# Patient Record
Sex: FEMALE | Race: WHITE | NOT HISPANIC OR LATINO | Employment: FULL TIME | URBAN - METROPOLITAN AREA
[De-identification: names, ages, dates, MRNs, and addresses within clinical notes are randomized per-mention and may not be internally consistent; named-entity substitution may affect disease eponyms.]

---

## 2017-01-23 ENCOUNTER — PATIENT MESSAGE (OUTPATIENT)
Dept: RHEUMATOLOGY | Facility: CLINIC | Age: 47
End: 2017-01-23

## 2017-04-04 ENCOUNTER — LAB VISIT (OUTPATIENT)
Dept: LAB | Facility: HOSPITAL | Age: 47
End: 2017-04-04
Attending: INTERNAL MEDICINE
Payer: COMMERCIAL

## 2017-04-04 DIAGNOSIS — M05.79 RHEUMATOID ARTHRITIS INVOLVING MULTIPLE SITES WITH POSITIVE RHEUMATOID FACTOR: Chronic | ICD-10-CM

## 2017-04-04 LAB
ALBUMIN SERPL BCP-MCNC: 4.1 G/DL
ALP SERPL-CCNC: 93 U/L
ALT SERPL W/O P-5'-P-CCNC: 26 U/L
ANION GAP SERPL CALC-SCNC: 8 MMOL/L
AST SERPL-CCNC: 26 U/L
BASOPHILS # BLD AUTO: 0.07 K/UL
BASOPHILS NFR BLD: 1.2 %
BILIRUB SERPL-MCNC: 0.4 MG/DL
BUN SERPL-MCNC: 26 MG/DL
CALCIUM SERPL-MCNC: 9.9 MG/DL
CHLORIDE SERPL-SCNC: 109 MMOL/L
CO2 SERPL-SCNC: 27 MMOL/L
CREAT SERPL-MCNC: 1.3 MG/DL
DIFFERENTIAL METHOD: ABNORMAL
EOSINOPHIL # BLD AUTO: 0.3 K/UL
EOSINOPHIL NFR BLD: 4.7 %
ERYTHROCYTE [DISTWIDTH] IN BLOOD BY AUTOMATED COUNT: 12.4 %
ERYTHROCYTE [SEDIMENTATION RATE] IN BLOOD BY WESTERGREN METHOD: 4 MM/HR
EST. GFR  (AFRICAN AMERICAN): 56 ML/MIN/1.73 M^2
EST. GFR  (NON AFRICAN AMERICAN): 48 ML/MIN/1.73 M^2
GLUCOSE SERPL-MCNC: 86 MG/DL
HCT VFR BLD AUTO: 40.2 %
HGB BLD-MCNC: 13.3 G/DL
LYMPHOCYTES # BLD AUTO: 2 K/UL
LYMPHOCYTES NFR BLD: 33.6 %
MCH RBC QN AUTO: 31.4 PG
MCHC RBC AUTO-ENTMCNC: 33.1 %
MCV RBC AUTO: 95 FL
MONOCYTES # BLD AUTO: 0.5 K/UL
MONOCYTES NFR BLD: 8.9 %
NEUTROPHILS # BLD AUTO: 3.1 K/UL
NEUTROPHILS NFR BLD: 51.6 %
PLATELET # BLD AUTO: 284 K/UL
PMV BLD AUTO: 10.9 FL
POTASSIUM SERPL-SCNC: 4.1 MMOL/L
PROT SERPL-MCNC: 6.9 G/DL
RBC # BLD AUTO: 4.24 M/UL
SODIUM SERPL-SCNC: 144 MMOL/L
WBC # BLD AUTO: 5.93 K/UL

## 2017-04-04 PROCEDURE — 85651 RBC SED RATE NONAUTOMATED: CPT | Mod: PO

## 2017-04-04 PROCEDURE — 85025 COMPLETE CBC W/AUTO DIFF WBC: CPT | Mod: PO

## 2017-04-04 PROCEDURE — 80053 COMPREHEN METABOLIC PANEL: CPT | Mod: PO

## 2017-04-04 PROCEDURE — 86140 C-REACTIVE PROTEIN: CPT

## 2017-04-04 PROCEDURE — 36415 COLL VENOUS BLD VENIPUNCTURE: CPT | Mod: PO

## 2017-04-05 ENCOUNTER — OFFICE VISIT (OUTPATIENT)
Dept: RHEUMATOLOGY | Facility: CLINIC | Age: 47
End: 2017-04-05
Payer: COMMERCIAL

## 2017-04-05 VITALS
DIASTOLIC BLOOD PRESSURE: 60 MMHG | SYSTOLIC BLOOD PRESSURE: 107 MMHG | WEIGHT: 144.38 LBS | BODY MASS INDEX: 22.28 KG/M2 | HEART RATE: 64 BPM

## 2017-04-05 DIAGNOSIS — D84.9 IMMUNOCOMPROMISED: Primary | ICD-10-CM

## 2017-04-05 DIAGNOSIS — Z51.81 MEDICATION MONITORING ENCOUNTER: Chronic | ICD-10-CM

## 2017-04-05 DIAGNOSIS — M05.79 RHEUMATOID ARTHRITIS INVOLVING MULTIPLE SITES WITH POSITIVE RHEUMATOID FACTOR: Chronic | ICD-10-CM

## 2017-04-05 LAB
CCP AB SER IA-ACNC: 163.6 U/ML
CRP SERPL-MCNC: 0.4 MG/L

## 2017-04-05 PROCEDURE — 99999 PR PBB SHADOW E&M-EST. PATIENT-LVL III: CPT | Mod: PBBFAC,,, | Performed by: PHYSICIAN ASSISTANT

## 2017-04-05 PROCEDURE — 86200 CCP ANTIBODY: CPT

## 2017-04-05 PROCEDURE — 99214 OFFICE O/P EST MOD 30 MIN: CPT | Mod: S$GLB,,, | Performed by: PHYSICIAN ASSISTANT

## 2017-04-05 PROCEDURE — 1160F RVW MEDS BY RX/DR IN RCRD: CPT | Mod: S$GLB,,, | Performed by: PHYSICIAN ASSISTANT

## 2017-04-05 RX ORDER — AMOXICILLIN 500 MG
2 CAPSULE ORAL
COMMUNITY

## 2017-04-05 RX ORDER — IBUPROFEN 100 MG/5ML
1000 SUSPENSION, ORAL (FINAL DOSE FORM) ORAL
COMMUNITY

## 2017-04-05 RX ORDER — MONTELUKAST SODIUM 10 MG/1
10 TABLET ORAL DAILY PRN
COMMUNITY
Start: 2017-04-04 | End: 2017-12-11

## 2017-04-05 RX ORDER — MULTIVITAMIN
1 TABLET ORAL
COMMUNITY

## 2017-04-05 ASSESSMENT — CLINICAL DISEASE ACTIVITY INDEX (CDAI)
TOTAL_SCORE: 3
PHYSICIAN_ASSESSMENT: 1
TENDER_JOINTS_COUNT: 1
SWOLLEN_JOINTS_COUNT: 0
PATIENT_ASSESSMENT: 1

## 2017-04-05 ASSESSMENT — ROUTINE ASSESSMENT OF PATIENT INDEX DATA (RAPID3): MDHAQ FUNCTION SCORE: 0

## 2017-04-05 NOTE — PROGRESS NOTES
Subjective:       Patient ID: Elizabeth Johns is a 47 y.o. female.    Chief Complaint: Rheumatoid Arthritis      HPI Comments: Elizabeth is here today  For rheum follow up. seropositive rheumatoid arthritis. She has been on orencia for several year. Doing excellent. She has been able to de-escalate down to 1 injection every 4 weeks. Failed mtx monotherapy in the past. Off mtx due to side effects (fatigue and nausea). She is not off prednisone. In the past failed humira, enbrel and xeljanz these just did not help. Has been pain free for quite some time. The past 2 days some mild increased right shoulder pain. Feeling better today. Pain 5/10 isolated to right shoulder only. At times some aching at night. She denies morning stiffness.  No issues with fevers or infections.  No weight loss.      High titers CCP positive but sedimentation rate and CRP have been normal.  Rheumatoid factor negative.           Shoulder Pain   Associated symptoms include arthralgias. Pertinent negatives include no abdominal pain, chest pain, chills, fatigue, fever, joint swelling, myalgias, nausea, neck pain, rash, vomiting or weakness.       Review of Systems   Constitutional: Negative.  Negative for activity change, appetite change, chills, fatigue and fever.   HENT: Negative.  Negative for mouth sores and trouble swallowing.         No dry mouth   Eyes: Negative.  Negative for photophobia, pain and redness.        No swollen or red eyes, no dry eye     Respiratory: Negative.  Negative for chest tightness, shortness of breath, wheezing and stridor.    Cardiovascular: Negative.  Negative for chest pain.   Gastrointestinal: Negative.  Negative for abdominal pain, blood in stool, diarrhea, nausea and vomiting.   Genitourinary: Negative.  Negative for dysuria, frequency, hematuria and urgency.   Musculoskeletal: Positive for arthralgias. Negative for back pain, gait problem, joint swelling, myalgias, neck pain and neck stiffness.              Skin: Negative.  Negative for color change, pallor and rash.   Neurological: Negative.  Negative for weakness.   Hematological: Negative for adenopathy.   Psychiatric/Behavioral: Negative for suicidal ideas.   All other systems reviewed and are negative.        Objective:   /60  Pulse 64  Wt 65.5 kg (144 lb 6.4 oz)  BMI 22.28 kg/m2     Physical Exam   Constitutional: She is oriented to person, place, and time and well-developed, well-nourished, and in no distress. No distress.   HENT:   Head: Normocephalic and atraumatic.   Right Ear: External ear normal.   Left Ear: External ear normal.   Mouth/Throat: No oropharyngeal exudate.   Eyes: Conjunctivae and EOM are normal. Pupils are equal, round, and reactive to light. No scleral icterus.   Neck: Normal range of motion. Neck supple. No thyromegaly present.   Cardiovascular: Normal rate, regular rhythm and normal heart sounds.    No murmur heard.  Pulmonary/Chest: Effort normal and breath sounds normal. She exhibits no tenderness.   Abdominal: Soft. Bowel sounds are normal.       Right Side Rheumatological Exam     Examination finds the elbow, wrist, knee, 1st PIP, 1st MCP, 2nd PIP, 2nd MCP, 3rd PIP, 3rd MCP, 4th PIP, 4th MCP, 5th PIP and 5th MCP normal.    The patient is tender to palpation of the shoulder    Joint Exam Comments   Shoulder: Full range of motion of the right shoulder without any significant signs of impingement, no evidence of rotator cuff tear strength is normal  Some tenderness to palpation of the anterior joint more consistent with a tendinitis  No erythema, warmth or swelling noted    Left Side Rheumatological Exam     Examination finds the elbow, wrist, 1st PIP, 1st MCP, 2nd PIP, 2nd MCP, 3rd PIP, 3rd MCP, 4th PIP, 4th MCP, 5th PIP and 5th MCP normal.    The patient is tender to palpation of the shoulder.      Lymphadenopathy:     She has no cervical adenopathy.   Neurological: She is alert and oriented to person, place, and time. She  displays normal reflexes. No cranial nerve deficit. She exhibits normal muscle tone. Gait normal.   Skin: Skin is warm and dry. No rash noted.     Musculoskeletal: Normal range of motion. She exhibits tenderness. She exhibits no edema.                  Results for orders placed or performed in visit on 04/04/17   CBC auto differential   Result Value Ref Range    WBC 5.93 3.90 - 12.70 K/uL    RBC 4.24 4.00 - 5.40 M/uL    Hemoglobin 13.3 12.0 - 16.0 g/dL    Hematocrit 40.2 37.0 - 48.5 %    MCV 95 82 - 98 fL    MCH 31.4 (H) 27.0 - 31.0 pg    MCHC 33.1 32.0 - 36.0 %    RDW 12.4 11.5 - 14.5 %    Platelets 284 150 - 350 K/uL    MPV 10.9 9.2 - 12.9 fL    Gran # 3.1 1.8 - 7.7 K/uL    Lymph # 2.0 1.0 - 4.8 K/uL    Mono # 0.5 0.3 - 1.0 K/uL    Eos # 0.3 0.0 - 0.5 K/uL    Baso # 0.07 0.00 - 0.20 K/uL    Gran% 51.6 38.0 - 73.0 %    Lymph% 33.6 18.0 - 48.0 %    Mono% 8.9 4.0 - 15.0 %    Eosinophil% 4.7 0.0 - 8.0 %    Basophil% 1.2 0.0 - 1.9 %    Differential Method Automated    Comprehensive metabolic panel   Result Value Ref Range    Sodium 144 136 - 145 mmol/L    Potassium 4.1 3.5 - 5.1 mmol/L    Chloride 109 95 - 110 mmol/L    CO2 27 23 - 29 mmol/L    Glucose 86 70 - 110 mg/dL    BUN, Bld 26 (H) 6 - 20 mg/dL    Creatinine 1.3 0.5 - 1.4 mg/dL    Calcium 9.9 8.7 - 10.5 mg/dL    Total Protein 6.9 6.0 - 8.4 g/dL    Albumin 4.1 3.5 - 5.2 g/dL    Total Bilirubin 0.4 0.1 - 1.0 mg/dL    Alkaline Phosphatase 93 55 - 135 U/L    AST 26 10 - 40 U/L    ALT 26 10 - 44 U/L    Anion Gap 8 8 - 16 mmol/L    eGFR if African American 56 (A) >60 mL/min/1.73 m^2    eGFR if non African American 48 (A) >60 mL/min/1.73 m^2   Sedimentation rate, manual   Result Value Ref Range    Sed Rate 4 0 - 20 mm/Hr          Assessment:       1. Immunocompromised    2. Rheumatoid arthritis involving multiple sites with positive rheumatoid factor    3. Medication monitoring encounter        1.  Seropositive rheumatoid arthritis doing well on orencia 125 mg every 4  week injection today 1 tender/ 0 swollen joints - minimal activity noted by CDAI 3, BHASKAR 0  Off oral methotrexate because of side effects  In the past failed methotrexate monotherapy, failed Humira, Enbrel and Xeljanz    Excellent response to Orencia    2.  Mild right shoulder pain most likely very mild tendinitis no signs of impingement or rotator cuff tear     3.   Vaccines up-to-date     4.  Medication monitoring with no toxicity side effects, chronic immunosuppression with no recurrent infections     Plan:         continue her orencia 125 mg sub Q 4 weeks, if she develops any increasing joint symptoms and we can increase her frequency to about every 3 weeks and see if that helps     For right shoulder tendinitis trial of Pennsaid topical bid to right shoulder  Can try over-the-counter nonsteroidals  Let me know if symptoms worsen     See her back in 5 months with labs,     call with any questions, changes, or concerns.

## 2017-04-05 NOTE — MR AVS SNAPSHOT
[facility] Rheumatology  [address] [place] [address]  Phone: [phone]  Fax: [phone]                  Elizabeth Johns   2017 11:30 AM   Office Visit    Description:  Female : 1970   Provider:  Niurka Rubio PA-C   Department:  [facility] - Rheumatology           Reason for Visit     Rheumatoid Arthritis           Diagnoses this Visit        Comments    Immunocompromised    -  Primary     Rheumatoid arthritis involving multiple sites with positive rheumatoid factor         Medication monitoring encounter                To Do List           Future Appointments        Provider Department Dept Phone    2017 11:05 AM LABORATORY, [facility] [phone]    2017 3:30 PM Osmar Childers MD [facility] [phone]      Goals (5 Years of Data)     None      [facility]     [facility] Care [facility] -  Assistance  Unless otherwise directed by your provider, please contact [facility], our nurse care line that is available for  assistance.     Registered nurses in the [facility] provide: appointment scheduling, clinical advisement, health education, and other advisory services.  Call: [phone] (toll free)               Medications           Message regarding Medications     Verify the changes and/or additions to your medication regime listed below are the same as discussed with your clinician today.  If any of these changes or additions are incorrect, please notify your healthcare provider.        STOP taking these medications     sodium chloride 0.9% flush 10 mL     sodium chloride 0.9% flush 10 mL     sodium chloride 0.9% flush 10 mL     sodium chloride 0.9% flush 10 mL     sodium chloride 0.9% flush 10 mL     sodium chloride 0.9% flush 10 mL            Verify that the below list of medications is an accurate representation of the medications you are currently taking.  If none reported, the list may be  blank. If incorrect, please contact your healthcare provider. Carry this list with you in case of emergency.           Current Medications     abatacept (ORENCIA) 125 mg/mL Syrg Inject 125 mg into the skin every 7 days.    ascorbic acid, vitamin C, (VITAMIN C) 1000 MG tablet Take 1,000 mg by mouth.    fish oil-omega-3 fatty acids 300-1,000 mg capsule Take 2 g by mouth.    montelukast (SINGULAIR) 10 mg tablet 10 mg daily as needed.    multivitamin (THERAGRAN) per tablet Take 1 tablet by mouth.           Clinical Reference Information           Your Vitals Were     BP Pulse Weight BMI       107/60 64 65.5 kg (144 lb 6.4 oz) 22.28 kg/m2       Blood Pressure          Most Recent Value    BP  107/60      Allergies as of 4/5/2017     No Known Allergies      Immunizations Administered on Date of Encounter - 4/5/2017     None      Orders Placed During Today's Visit      Normal Orders This Visit    Cyclic citrul peptide antibody, IgG       Language Assistance Services     ATTENTION: Language assistance services are available, free of charge. Please call [phone].      ATENCIÓN: Si habla ottoniel, tiene a marie disposición servicios gratuitos de asistencia lingüística. Llame al [phone].     SOFI Ý: N?u b?n nói Ti?ng Vi?t, có các d?ch v? h? tr? ngôn ng? mi?n phí dành cho b?n. G?i s? [phone].         [facility] - Rheumatology complies with applicable Federal civil rights laws and does not discriminate on the basis of race, color, national origin, age, disability, or sex.

## 2017-04-20 ENCOUNTER — TELEPHONE (OUTPATIENT)
Dept: RHEUMATOLOGY | Facility: CLINIC | Age: 47
End: 2017-04-20

## 2017-04-20 DIAGNOSIS — G89.29 CHRONIC PAIN OF BOTH SHOULDERS: Primary | ICD-10-CM

## 2017-04-20 DIAGNOSIS — M05.79 RHEUMATOID ARTHRITIS INVOLVING MULTIPLE SITES WITH POSITIVE RHEUMATOID FACTOR: Chronic | ICD-10-CM

## 2017-04-20 DIAGNOSIS — M25.511 CHRONIC PAIN OF BOTH SHOULDERS: Primary | ICD-10-CM

## 2017-04-20 DIAGNOSIS — M25.512 CHRONIC PAIN OF BOTH SHOULDERS: Primary | ICD-10-CM

## 2017-04-20 RX ORDER — CELECOXIB 200 MG/1
200 CAPSULE ORAL DAILY
Qty: 30 CAPSULE | Refills: 6 | Status: SHIPPED | OUTPATIENT
Start: 2017-04-20 | End: 2017-09-11

## 2017-04-20 NOTE — TELEPHONE ENCOUNTER
Patient is requesting Celebrex due to bilat shoulder pain and other joints. She states she is currently in a flare after being off of her meds for 3 weeks post surgery. Please advise.

## 2017-04-25 ENCOUNTER — OFFICE VISIT (OUTPATIENT)
Dept: RHEUMATOLOGY | Facility: CLINIC | Age: 47
End: 2017-04-25
Payer: COMMERCIAL

## 2017-04-25 VITALS
DIASTOLIC BLOOD PRESSURE: 45 MMHG | WEIGHT: 148.38 LBS | HEART RATE: 60 BPM | HEIGHT: 68 IN | BODY MASS INDEX: 22.49 KG/M2 | SYSTOLIC BLOOD PRESSURE: 106 MMHG

## 2017-04-25 DIAGNOSIS — M05.712 RHEUMATOID ARTHRITIS INVOLVING LEFT SHOULDER WITH POSITIVE RHEUMATOID FACTOR: ICD-10-CM

## 2017-04-25 DIAGNOSIS — M05.79 RHEUMATOID ARTHRITIS INVOLVING MULTIPLE SITES WITH POSITIVE RHEUMATOID FACTOR: Primary | Chronic | ICD-10-CM

## 2017-04-25 DIAGNOSIS — Z51.81 MEDICATION MONITORING ENCOUNTER: Chronic | ICD-10-CM

## 2017-04-25 DIAGNOSIS — D84.9 IMMUNOCOMPROMISED: ICD-10-CM

## 2017-04-25 PROCEDURE — 20610 DRAIN/INJ JOINT/BURSA W/O US: CPT | Mod: S$GLB,,, | Performed by: PHYSICIAN ASSISTANT

## 2017-04-25 PROCEDURE — 99214 OFFICE O/P EST MOD 30 MIN: CPT | Mod: 25,S$GLB,, | Performed by: PHYSICIAN ASSISTANT

## 2017-04-25 PROCEDURE — 99999 PR PBB SHADOW E&M-EST. PATIENT-LVL III: CPT | Mod: PBBFAC,,, | Performed by: PHYSICIAN ASSISTANT

## 2017-04-25 PROCEDURE — 1160F RVW MEDS BY RX/DR IN RCRD: CPT | Mod: S$GLB,,, | Performed by: PHYSICIAN ASSISTANT

## 2017-04-25 RX ORDER — METHYLPREDNISOLONE ACETATE 80 MG/ML
80 INJECTION, SUSPENSION INTRA-ARTICULAR; INTRALESIONAL; INTRAMUSCULAR; SOFT TISSUE
Status: COMPLETED | OUTPATIENT
Start: 2017-04-25 | End: 2017-04-25

## 2017-04-25 RX ORDER — DICLOFENAC SODIUM 20 MG/G
40 SOLUTION TOPICAL 2 TIMES DAILY
Qty: 1 BOTTLE | Refills: 6 | Status: SHIPPED | OUTPATIENT
Start: 2017-04-25 | End: 2017-09-11 | Stop reason: SDUPTHER

## 2017-04-25 RX ORDER — BETAMETHASONE SODIUM PHOSPHATE AND BETAMETHASONE ACETATE 3; 3 MG/ML; MG/ML
3 INJECTION, SUSPENSION INTRA-ARTICULAR; INTRALESIONAL; INTRAMUSCULAR; SOFT TISSUE
Status: COMPLETED | OUTPATIENT
Start: 2017-04-25 | End: 2017-04-25

## 2017-04-25 RX ADMIN — BETAMETHASONE SODIUM PHOSPHATE AND BETAMETHASONE ACETATE 3 MG: 3; 3 INJECTION, SUSPENSION INTRA-ARTICULAR; INTRALESIONAL; INTRAMUSCULAR; SOFT TISSUE at 08:04

## 2017-04-25 RX ADMIN — METHYLPREDNISOLONE ACETATE 80 MG: 80 INJECTION, SUSPENSION INTRA-ARTICULAR; INTRALESIONAL; INTRAMUSCULAR; SOFT TISSUE at 08:04

## 2017-04-25 ASSESSMENT — CLINICAL DISEASE ACTIVITY INDEX (CDAI)
TOTAL_SCORE: 6
SWOLLEN_JOINTS_COUNT: 1
TENDER_JOINTS_COUNT: 2
PHYSICIAN_ASSESSMENT: 1
PATIENT_ASSESSMENT: 2

## 2017-04-25 ASSESSMENT — ROUTINE ASSESSMENT OF PATIENT INDEX DATA (RAPID3): MDHAQ FUNCTION SCORE: .3

## 2017-04-25 NOTE — PROGRESS NOTES
Subjective:       Patient ID: Elizabeth Johns is a 47 y.o. female.    Chief Complaint: Rheumatoid Arthritis      HPI Comments: Elizabeth is here today  For rheum follow up. seropositive rheumatoid arthritis. She has been on orencia for several year. Doing excellent. She has been able to de-escalate down to 1 injection every 3-4 weeks. Failed mtx monotherapy in the past. Off mtx due to side effects (fatigue and nausea). She is not off prednisone. In the past failed humira, enbrel and xeljanz these just did not help.     Had surgery in feb. Off orencia X 4 weeks. Initially did well but last week started having more hand and left shoulder pain. Significant stiffness in her hands, left gabino. Orencia was still at every 3 weeks.  Pain 5/10   No issues with fevers or infections.  No weight loss.      High titers CCP positive but sedimentation rate and CRP have been normal.  Rheumatoid factor negative.           Shoulder Pain   Associated symptoms include arthralgias. Pertinent negatives include no abdominal pain, chest pain, chills, fatigue, fever, joint swelling, myalgias, nausea, neck pain, rash, vomiting or weakness.       Review of Systems   Constitutional: Negative.  Negative for activity change, appetite change, chills, fatigue and fever.   HENT: Negative.  Negative for mouth sores and trouble swallowing.         No dry mouth   Eyes: Negative.  Negative for photophobia, pain and redness.        No swollen or red eyes, no dry eye     Respiratory: Negative.  Negative for chest tightness, shortness of breath, wheezing and stridor.    Cardiovascular: Negative.  Negative for chest pain.   Gastrointestinal: Negative.  Negative for abdominal pain, blood in stool, diarrhea, nausea and vomiting.   Genitourinary: Negative.  Negative for dysuria, frequency, hematuria and urgency.   Musculoskeletal: Positive for arthralgias. Negative for back pain, gait problem, joint swelling, myalgias, neck pain and neck stiffness.            "  Skin: Negative.  Negative for color change, pallor and rash.   Neurological: Negative.  Negative for weakness.   Hematological: Negative for adenopathy.   Psychiatric/Behavioral: Negative for suicidal ideas.   All other systems reviewed and are negative.        Objective:   BP (!) 106/45  Pulse 60  Ht 5' 7.5" (1.715 m)  Wt 67.3 kg (148 lb 5.9 oz)  BMI 22.89 kg/m2     Physical Exam   Constitutional: She is oriented to person, place, and time and well-developed, well-nourished, and in no distress. No distress.   HENT:   Head: Normocephalic and atraumatic.   Right Ear: External ear normal.   Left Ear: External ear normal.   Mouth/Throat: No oropharyngeal exudate.   Eyes: Conjunctivae and EOM are normal. Pupils are equal, round, and reactive to light. No scleral icterus.   Neck: Normal range of motion. Neck supple. No thyromegaly present.   Cardiovascular: Normal rate, regular rhythm and normal heart sounds.    No murmur heard.  Pulmonary/Chest: Effort normal and breath sounds normal. She exhibits no tenderness.   Abdominal: Soft. Bowel sounds are normal.       Right Side Rheumatological Exam     Examination finds the shoulder, elbow, wrist, knee, 1st PIP, 1st MCP, 2nd PIP, 2nd MCP, 3rd PIP, 3rd MCP, 4th PIP, 4th MCP, 5th PIP and 5th MCP normal.    Joint Exam Comments   Shoulder: Full range of motion of the right shoulder without any significant signs of impingement, no evidence of rotator cuff tear strength is normal  Some tenderness to palpation of the anterior joint more consistent with a tendinitis  No erythema, warmth or swelling noted    Left Side Rheumatological Exam     Examination finds the elbow, wrist, knee, 1st PIP, 1st MCP, 2nd PIP, 3rd PIP, 3rd MCP, 4th PIP, 4th MCP, 5th PIP and 5th MCP normal.    The patient is tender to palpation of the shoulder and 2nd MCP.    She has swelling of the 2nd MCP      Lymphadenopathy:     She has no cervical adenopathy.   Neurological: She is alert and oriented to " person, place, and time. She displays normal reflexes. No cranial nerve deficit. She exhibits normal muscle tone. Gait normal.   Skin: Skin is warm and dry. No rash noted.     Musculoskeletal: Normal range of motion. She exhibits tenderness. She exhibits no edema.                  Results for orders placed or performed in visit on 04/05/17   Cyclic citrul peptide antibody, IgG   Result Value Ref Range    CCP Antibodies 163.6 (H) <5.0 U/mL          Assessment:       1. Rheumatoid arthritis involving multiple sites with positive rheumatoid factor    2. Medication monitoring encounter    3. Immunocompromised        1.  Seropositive rheumatoid arthritis - mild flare with being off schedule on orencia due to surgery, resumed de-escalation dose of Orencia 125 mg every 3-4 weeks but had exacerbation --today 2 tender/ 1 swollen joints - minimal activity noted by CDAI 6, BHASKAR 0.3  Off oral methotrexate because of side effects  In the past failed methotrexate monotherapy, failed Humira, Enbrel and Xeljanz    2. Left  shoulder pain most likely very mild tendinitis no signs of impingement or rotator cuff tear     3.   Vaccines up-to-date     4.  Medication monitoring with no toxicity side effects, chronic immunosuppression with no recurrent infections     Plan:       Put her back to once weekly orencia 125 mg sub X 4 weeks, see how she does  If back to baseline then slowly work herself back to every 2-3 weeks    Continue her Celebrex daily, take with food    Locally inject left shoulder today, see below  Procedure note: The  Left  shoulder was prepared with sterile prep.  The skin was anesthetized with 1% ethyl chloride.  The Left shoulder  was injected  with  2.5 cc of 1% lidocaine to anesthetize the joint  0.5 mL celestone/soluspan 30 mg/5 mL, 1.0 mL Depo-Medrol 80 mg/mL and 1.5 mL of 0.25% bupivacaine was then injected into the joint The patient tolerated procedure well with no complications. Hemostasis was obtained.  Patient  diischarged with icing instructions  Discussed injection risk/potential side effects as described below    Risks of joint injections, steroid injections, and aspirations include but are not limited to:   Allergic reaction, Infection, Bleeding, Bruising, Post injection flare of joint swelling and pain, Skin depigmentation, Local fat atrophy, Tendon rupture, and/or Failure of symptoms to improve    Icing instructions given to patient- ice area of injection for 15--20 min at least  3-4 X daily for the next 2-3 days.    If worsening and progressive pain develops please call the office       rtc 6 weeks with reg 4 labs     call with any questions, changes, or concerns.

## 2017-06-18 DIAGNOSIS — M05.79 RHEUMATOID ARTHRITIS INVOLVING MULTIPLE SITES WITH POSITIVE RHEUMATOID FACTOR: Chronic | ICD-10-CM

## 2017-06-19 RX ORDER — ABATACEPT 125 MG/ML
INJECTION, SOLUTION SUBCUTANEOUS
Qty: 4 ML | Refills: 4 | Status: SHIPPED | OUTPATIENT
Start: 2017-06-19 | End: 2017-12-15 | Stop reason: SDUPTHER

## 2017-09-06 ENCOUNTER — PATIENT MESSAGE (OUTPATIENT)
Dept: RHEUMATOLOGY | Facility: CLINIC | Age: 47
End: 2017-09-06

## 2017-09-07 ENCOUNTER — LAB VISIT (OUTPATIENT)
Dept: LAB | Facility: HOSPITAL | Age: 47
End: 2017-09-07
Attending: INTERNAL MEDICINE
Payer: COMMERCIAL

## 2017-09-07 DIAGNOSIS — M05.79 RHEUMATOID ARTHRITIS INVOLVING MULTIPLE SITES WITH POSITIVE RHEUMATOID FACTOR: Chronic | ICD-10-CM

## 2017-09-07 LAB
ALBUMIN SERPL BCP-MCNC: 4.1 G/DL
ALP SERPL-CCNC: 87 U/L
ALT SERPL W/O P-5'-P-CCNC: 18 U/L
ANION GAP SERPL CALC-SCNC: 9 MMOL/L
AST SERPL-CCNC: 23 U/L
BASOPHILS # BLD AUTO: 0.06 K/UL
BASOPHILS NFR BLD: 1.1 %
BILIRUB SERPL-MCNC: 0.3 MG/DL
BUN SERPL-MCNC: 30 MG/DL
CALCIUM SERPL-MCNC: 9.6 MG/DL
CHLORIDE SERPL-SCNC: 103 MMOL/L
CO2 SERPL-SCNC: 27 MMOL/L
CREAT SERPL-MCNC: 1 MG/DL
DIFFERENTIAL METHOD: ABNORMAL
EOSINOPHIL # BLD AUTO: 0.5 K/UL
EOSINOPHIL NFR BLD: 9.2 %
ERYTHROCYTE [DISTWIDTH] IN BLOOD BY AUTOMATED COUNT: 11.7 %
ERYTHROCYTE [SEDIMENTATION RATE] IN BLOOD BY WESTERGREN METHOD: 1 MM/HR
EST. GFR  (AFRICAN AMERICAN): >60 ML/MIN/1.73 M^2
EST. GFR  (NON AFRICAN AMERICAN): >60 ML/MIN/1.73 M^2
GLUCOSE SERPL-MCNC: 112 MG/DL
HCT VFR BLD AUTO: 40.6 %
HGB BLD-MCNC: 14 G/DL
LYMPHOCYTES # BLD AUTO: 2.7 K/UL
LYMPHOCYTES NFR BLD: 50.3 %
MCH RBC QN AUTO: 32.3 PG
MCHC RBC AUTO-ENTMCNC: 34.5 G/DL
MCV RBC AUTO: 94 FL
MONOCYTES # BLD AUTO: 0.3 K/UL
MONOCYTES NFR BLD: 5.2 %
NEUTROPHILS # BLD AUTO: 1.8 K/UL
NEUTROPHILS NFR BLD: 34.2 %
PLATELET # BLD AUTO: 211 K/UL
PMV BLD AUTO: 10.7 FL
POTASSIUM SERPL-SCNC: 4.1 MMOL/L
PROT SERPL-MCNC: 6.8 G/DL
RBC # BLD AUTO: 4.33 M/UL
SODIUM SERPL-SCNC: 139 MMOL/L
WBC # BLD AUTO: 5.35 K/UL

## 2017-09-07 PROCEDURE — 85651 RBC SED RATE NONAUTOMATED: CPT | Mod: PO

## 2017-09-07 PROCEDURE — 36415 COLL VENOUS BLD VENIPUNCTURE: CPT | Mod: PO

## 2017-09-07 PROCEDURE — 86140 C-REACTIVE PROTEIN: CPT

## 2017-09-07 PROCEDURE — 80053 COMPREHEN METABOLIC PANEL: CPT | Mod: PO

## 2017-09-07 PROCEDURE — 85025 COMPLETE CBC W/AUTO DIFF WBC: CPT | Mod: PO

## 2017-09-08 LAB — CRP SERPL-MCNC: 0.2 MG/L

## 2017-09-11 ENCOUNTER — OFFICE VISIT (OUTPATIENT)
Dept: RHEUMATOLOGY | Facility: CLINIC | Age: 47
End: 2017-09-11
Payer: COMMERCIAL

## 2017-09-11 VITALS
SYSTOLIC BLOOD PRESSURE: 102 MMHG | HEIGHT: 68 IN | HEART RATE: 66 BPM | WEIGHT: 136.25 LBS | DIASTOLIC BLOOD PRESSURE: 63 MMHG | BODY MASS INDEX: 20.65 KG/M2

## 2017-09-11 DIAGNOSIS — M05.79 RHEUMATOID ARTHRITIS INVOLVING MULTIPLE SITES WITH POSITIVE RHEUMATOID FACTOR: Primary | Chronic | ICD-10-CM

## 2017-09-11 DIAGNOSIS — D84.9 IMMUNOCOMPROMISED: ICD-10-CM

## 2017-09-11 DIAGNOSIS — G89.29 CHRONIC PAIN OF LEFT KNEE: ICD-10-CM

## 2017-09-11 DIAGNOSIS — M25.561 CHRONIC PAIN OF RIGHT KNEE: ICD-10-CM

## 2017-09-11 DIAGNOSIS — G89.29 CHRONIC PAIN OF RIGHT KNEE: ICD-10-CM

## 2017-09-11 DIAGNOSIS — M25.562 CHRONIC PAIN OF LEFT KNEE: ICD-10-CM

## 2017-09-11 DIAGNOSIS — Z51.81 MEDICATION MONITORING ENCOUNTER: Chronic | ICD-10-CM

## 2017-09-11 PROCEDURE — 3008F BODY MASS INDEX DOCD: CPT | Mod: S$GLB,,, | Performed by: INTERNAL MEDICINE

## 2017-09-11 PROCEDURE — 99999 PR PBB SHADOW E&M-EST. PATIENT-LVL III: CPT | Mod: PBBFAC,,, | Performed by: INTERNAL MEDICINE

## 2017-09-11 PROCEDURE — 99214 OFFICE O/P EST MOD 30 MIN: CPT | Mod: S$GLB,,, | Performed by: INTERNAL MEDICINE

## 2017-09-11 RX ORDER — DICLOFENAC SODIUM 20 MG/G
40 SOLUTION TOPICAL 2 TIMES DAILY
Qty: 1 BOTTLE | Refills: 6 | Status: SHIPPED | OUTPATIENT
Start: 2017-09-11 | End: 2018-05-23

## 2017-09-11 ASSESSMENT — ROUTINE ASSESSMENT OF PATIENT INDEX DATA (RAPID3): MDHAQ FUNCTION SCORE: 0

## 2017-09-11 NOTE — PROGRESS NOTES
CHIEF COMPLAINT:  Rheumatoid arthritis.    PERTINENT HISTORY:  Elizabeth was last seen by Niurka in end of April.  She was   doing well at that time.  They went ahead to skip her Orencia because of some   surgery.  She was started back on once weekly 125 for 4 weeks and moved out to   every 2 or 3 weeks again.  She is doing well, rating her pain level at 0.  She   is not using Celebrex anymore because it is not helping her that much and it was   causing fluid retention.  She has had no other issues.  She denies any   infections.    REVIEW OF SYSTEM:  With her weight stable.  No fevers.  Immunizations up-to-date   other than her flu shot, which will be due in a couple of months.   PULMONARY:  Denies shortness of breath.  MCGILL, cyanosis, wheezing, cough and   sputum production.  CARDIOVASCULAR:  Denies chest pain, PND, orthopnea or reduced exercise tolerance  ABDOMEN:  No weight loss.  Denies nausea, vomiting, diarrhea, abdominal pain,   hematemesis or blood in stool.  JOINTS:  Negative other than occasional knee pains this weekend when she was in   a car eight hours after watching her daughter play ball in [place].  She just used   Pennsaid for that and it does help.  She has had neck pain in the past.    PHYSICAL EXAMINATION:  VITAL SIGNS:  Weight at 61 kgs, BMI 21, at 5 feet 7.5, blood pressure 102/63,   pulse in the 60s, pain score 0.   EXTREMITIES:  Hand exam PIP, MPs, and wrist is normal.  No swelling and redness.    Elbow and shoulders move well.  No tenderness.  Hips and knees move well.    There is no fluid in either knee.  They are not tender today.  Her ankles move   well.  Her metatarsals with some bony enlargement of the first, but no   tenderness.      IMAGING:  X-rays from 2014, no erosions in the hands or feet at that time.  Most   recent CCP antibodies still positive about 160 with negative rheumatoid factor.        LABORATORY DATA:  Today looks excellent with her CBC normal, hemoglobin improved   to 14, sed  rate of 1.  Chemistry is normal.  CRP is at 0.2.   X-rays reviewed.        IMPRESSION:  1.  Rheumatoid arthritis, CDAI 0.  FRAX score 0.    2.  Medication monitoring with Orencia, no toxicity, using intermittent dosing,   approximately every 2 to 3 weeks, allowing that as she is doing so well.  3.  Nonspecific knee pains-no evidence of synovitis, must be just related to   stress after being in sitting position for hours at a time when she was driving.    Note the left shoulder pain she had last visit did improve after injection of   the tendon rotator cuff.    PLAN:  1.  Maintain Orencia 125 every 2 to 3 weeks.  2.  We will okay Pennsaid to use intermittently on days.  3.  Follow routine labs.   We will get a flu shot high strength because of   immunocompromised in approximately two months.  Okay to stay off nonsteroidals.    See back in four months for routine lab.          / [id number] review          VIRGINIA/ADEEL  dd: 09/11/2017 16:33:03 (CDT)  td: 09/11/2017 23:08:28 (CDT)  Doc ID   #[id number]  Job ID #[id number]    CC:

## 2017-11-13 ENCOUNTER — TELEPHONE (OUTPATIENT)
Dept: RHEUMATOLOGY | Facility: CLINIC | Age: 47
End: 2017-11-13

## 2017-11-13 NOTE — TELEPHONE ENCOUNTER
----- Message from Ananya Angel sent at 11/13/2017 12:22 PM CST -----  Pt at [phone]//states she is needing to reschedule her flu shot//please call/thanks/lh

## 2017-11-14 ENCOUNTER — CLINICAL SUPPORT (OUTPATIENT)
Dept: RHEUMATOLOGY | Facility: CLINIC | Age: 47
End: 2017-11-14
Payer: COMMERCIAL

## 2017-11-14 DIAGNOSIS — D84.9 IMMUNOCOMPROMISED: ICD-10-CM

## 2017-11-14 PROCEDURE — 90662 IIV NO PRSV INCREASED AG IM: CPT | Mod: S$GLB,,, | Performed by: INTERNAL MEDICINE

## 2017-11-14 PROCEDURE — 99999 PR PBB SHADOW E&M-EST. PATIENT-LVL I: CPT | Mod: PBBFAC,,,

## 2017-11-14 PROCEDURE — 90471 IMMUNIZATION ADMIN: CPT | Mod: S$GLB,,, | Performed by: INTERNAL MEDICINE

## 2017-11-14 NOTE — PROGRESS NOTES
Administered 0.5 cc high doseInfluenza vaccination to left Deltoid. Pt tolerated well No acute reaction noted to site. Pt instructed on S/S to report. Advised patient to wait in lobby 15 minutes after receiving injection to monitor for any reactions.  Pt verbalized understanding.     Lot: [id number]  Exp: 6/6/18

## 2017-12-07 ENCOUNTER — HOSPITAL ENCOUNTER (OUTPATIENT)
Dept: RADIOLOGY | Facility: HOSPITAL | Age: 47
Discharge: HOME | End: 2017-12-07
Attending: INTERNAL MEDICINE
Payer: COMMERCIAL

## 2017-12-07 DIAGNOSIS — M05.79 RHEUMATOID ARTHRITIS INVOLVING MULTIPLE SITES WITH POSITIVE RHEUMATOID FACTOR: Chronic | ICD-10-CM

## 2017-12-07 PROCEDURE — 73630 X-RAY EXAM OF FOOT: CPT | Mod: 26,50,, | Performed by: RADIOLOGY

## 2017-12-07 PROCEDURE — 73130 X-RAY EXAM OF HAND: CPT | Mod: 26,50,, | Performed by: RADIOLOGY

## 2017-12-07 PROCEDURE — 73130 X-RAY EXAM OF HAND: CPT | Mod: 50,TC,PO

## 2017-12-07 PROCEDURE — 73630 X-RAY EXAM OF FOOT: CPT | Mod: 50,TC,PO

## 2017-12-11 ENCOUNTER — OFFICE VISIT (OUTPATIENT)
Dept: RHEUMATOLOGY | Facility: CLINIC | Age: 47
End: 2017-12-11
Payer: COMMERCIAL

## 2017-12-11 VITALS
WEIGHT: 139.13 LBS | HEART RATE: 62 BPM | SYSTOLIC BLOOD PRESSURE: 102 MMHG | BODY MASS INDEX: 21.84 KG/M2 | DIASTOLIC BLOOD PRESSURE: 68 MMHG | HEIGHT: 67 IN

## 2017-12-11 DIAGNOSIS — M05.712 RHEUMATOID ARTHRITIS INVOLVING LEFT SHOULDER WITH POSITIVE RHEUMATOID FACTOR: ICD-10-CM

## 2017-12-11 DIAGNOSIS — D84.9 IMMUNOCOMPROMISED: ICD-10-CM

## 2017-12-11 DIAGNOSIS — M05.79 RHEUMATOID ARTHRITIS INVOLVING MULTIPLE SITES WITH POSITIVE RHEUMATOID FACTOR: Primary | Chronic | ICD-10-CM

## 2017-12-11 DIAGNOSIS — Z51.81 MEDICATION MONITORING ENCOUNTER: Chronic | ICD-10-CM

## 2017-12-11 PROCEDURE — 99214 OFFICE O/P EST MOD 30 MIN: CPT | Mod: S$GLB,,, | Performed by: PHYSICIAN ASSISTANT

## 2017-12-11 PROCEDURE — 99999 PR PBB SHADOW E&M-EST. PATIENT-LVL III: CPT | Mod: PBBFAC,,, | Performed by: PHYSICIAN ASSISTANT

## 2017-12-11 ASSESSMENT — CLINICAL DISEASE ACTIVITY INDEX (CDAI)
TENDER_JOINTS_COUNT: 0
TOTAL_SCORE: 0
SWOLLEN_JOINTS_COUNT: 0
PHYSICIAN_ASSESSMENT: 0
PATIENT_ASSESSMENT: 0

## 2017-12-11 ASSESSMENT — ROUTINE ASSESSMENT OF PATIENT INDEX DATA (RAPID3): MDHAQ FUNCTION SCORE: 0

## 2017-12-11 NOTE — PROGRESS NOTES
Subjective:       Patient ID: Elizabeth Johns is a 47 y.o. female.    Chief Complaint: Rheumatoid Arthritis      Elizabeth is here today  For rheum follow up. seropositive rheumatoid arthritis. She has been on orencia for several year. Doing excellent. She has been able to de-escalate down to 1 injection every 3 weeks. Failed mtx monotherapy in the past. Off mtx due to side effects (fatigue and nausea). She is not off prednisone. In the past failed humira, enbrel and xeljanz these just did not help. A few weeks ago she added some grains and beans back in her diet and had mild flare. Cut back on these and when back to her regular diet and things resolved. Today reports her pain level is 0/10   No issues with fevers or infections.  No weight loss.    Uses pennsaid topical bid prn this helps.       High titers CCP positive but sedimentation rate and CRP have been normal.  Rheumatoid factor negative.             Shoulder Pain   Associated symptoms include arthralgias. Pertinent negatives include no abdominal pain, chest pain, chills, fatigue, fever, joint swelling, myalgias, nausea, neck pain, rash, vomiting or weakness.       Review of Systems   Constitutional: Negative.  Negative for activity change, appetite change, chills, fatigue and fever.   HENT: Negative.  Negative for mouth sores and trouble swallowing.         No dry mouth   Eyes: Negative.  Negative for photophobia, pain and redness.        No swollen or red eyes, no dry eye     Respiratory: Negative.  Negative for chest tightness, shortness of breath, wheezing and stridor.    Cardiovascular: Negative.  Negative for chest pain.   Gastrointestinal: Negative.  Negative for abdominal pain, blood in stool, diarrhea, nausea and vomiting.   Genitourinary: Negative.  Negative for dysuria, frequency, hematuria and urgency.   Musculoskeletal: Positive for arthralgias. Negative for back pain, gait problem, joint swelling, myalgias, neck pain and neck stiffness.         "     Skin: Negative.  Negative for color change, pallor and rash.   Neurological: Negative.  Negative for weakness.   Hematological: Negative for adenopathy.   Psychiatric/Behavioral: Negative for suicidal ideas.   All other systems reviewed and are negative.        Objective:   /68   Pulse 62   Ht 5' 7" (1.702 m)   Wt 63.1 kg (139 lb 1.8 oz)   BMI 21.79 kg/m²      Physical Exam   Constitutional: She is oriented to person, place, and time and well-developed, well-nourished, and in no distress. No distress.   HENT:   Head: Normocephalic and atraumatic.   Right Ear: External ear normal.   Left Ear: External ear normal.   Mouth/Throat: No oropharyngeal exudate.   Eyes: Conjunctivae and EOM are normal. Pupils are equal, round, and reactive to light. No scleral icterus.   Neck: Normal range of motion. Neck supple. No thyromegaly present.   Cardiovascular: Normal rate, regular rhythm and normal heart sounds.    No murmur heard.  Pulmonary/Chest: Effort normal and breath sounds normal. She exhibits no tenderness.   Abdominal: Soft. Bowel sounds are normal.       Right Side Rheumatological Exam     Examination finds the shoulder, elbow, wrist, knee, 1st PIP, 1st MCP, 2nd PIP, 2nd MCP, 3rd PIP, 3rd MCP, 4th PIP, 4th MCP, 5th PIP and 5th MCP normal.    Joint Exam Comments   Shoulder: Full range of motion of the right shoulder without any significant signs of impingement, no evidence of rotator cuff tear strength is normal  Some tenderness to palpation of the anterior joint more consistent with a tendinitis  No erythema, warmth or swelling noted    Left Side Rheumatological Exam     Examination finds the shoulder, elbow, wrist, knee, 1st PIP, 1st MCP, 2nd PIP, 2nd MCP, 3rd PIP, 3rd MCP, 4th PIP, 4th MCP, 5th PIP and 5th MCP normal.      Lymphadenopathy:     She has no cervical adenopathy.   Neurological: She is alert and oriented to person, place, and time. She displays normal reflexes. No cranial nerve deficit. She " exhibits normal muscle tone. Gait normal.   Skin: Skin is warm and dry. No rash noted.     Musculoskeletal: Normal range of motion. She exhibits no edema or tenderness.                  Results for orders placed or performed in visit on 12/07/17   CBC auto differential   Result Value Ref Range    WBC 5.20 3.90 - 12.70 K/uL    RBC 4.30 4.00 - 5.40 M/uL    Hemoglobin 14.3 12.0 - 16.0 g/dL    Hematocrit 41.0 37.0 - 48.5 %    MCV 95 82 - 98 fL    MCH 33.3 (H) 27.0 - 31.0 pg    MCHC 34.9 32.0 - 36.0 g/dL    RDW 12.3 11.5 - 14.5 %    Platelets 253 150 - 350 K/uL    MPV 10.4 9.2 - 12.9 fL    Gran # 2.3 1.8 - 7.7 K/uL    Lymph # 2.4 1.0 - 4.8 K/uL    Mono # 0.3 0.3 - 1.0 K/uL    Eos # 0.2 0.0 - 0.5 K/uL    Baso # 0.05 0.00 - 0.20 K/uL    Gran% 44.0 38.0 - 73.0 %    Lymph% 46.9 18.0 - 48.0 %    Mono% 5.2 4.0 - 15.0 %    Eosinophil% 2.9 0.0 - 8.0 %    Basophil% 1.0 0.0 - 1.9 %    Differential Method Automated    Comprehensive metabolic panel   Result Value Ref Range    Sodium 140 136 - 145 mmol/L    Potassium 4.0 3.5 - 5.1 mmol/L    Chloride 103 95 - 110 mmol/L    CO2 28 23 - 29 mmol/L    Glucose 87 70 - 110 mg/dL    BUN, Bld 30 (H) 6 - 20 mg/dL    Creatinine 1.1 0.5 - 1.4 mg/dL    Calcium 9.7 8.7 - 10.5 mg/dL    Total Protein 7.2 6.0 - 8.4 g/dL    Albumin 4.2 3.5 - 5.2 g/dL    Total Bilirubin 0.3 0.1 - 1.0 mg/dL    Alkaline Phosphatase 73 55 - 135 U/L    AST 24 10 - 40 U/L    ALT 24 10 - 44 U/L    Anion Gap 9 8 - 16 mmol/L    eGFR if African American >60 >60 mL/min/1.73 m^2    eGFR if non African American 59 (A) >60 mL/min/1.73 m^2   C-reactive protein   Result Value Ref Range    CRP 0.4 0.0 - 8.2 mg/L   Sedimentation rate, manual   Result Value Ref Range    Sed Rate 2 0 - 20 mm/Hr          Assessment:       1. Rheumatoid arthritis involving multiple sites with positive rheumatoid factor    2. Rheumatoid arthritis involving left shoulder with positive rheumatoid factor    3. Immunocompromised    4. Medication monitoring  encounter        1.  Seropositive rheumatoid arthritis - mild flare with being off schedule on orencia due to surgery, resumed de-escalation dose of Orencia 125 mg every 3 weeks but had exacerbation --today 0 tender/ 0 swollen joints - minimal activity noted by CDAI 0, BHASKAR 0  Off oral methotrexate because of side effects  In the past failed methotrexate monotherapy, failed Humira, Enbrel and Xeljanz    2.  Vaccines up-to-date     3.  Medication monitoring with no toxicity side effects, chronic immunosuppression with no recurrent infections     Plan:           Orencia Q 3 weeks and after 1st of the year can try to creep out to 3.5 weeks   no need to add other dmards     refill her pennsaid topical bid prn     Up to date on vaccine    rtc 4.5 with reg 4 labs     call with any questions, changes, or concerns

## 2017-12-15 DIAGNOSIS — M05.79 RHEUMATOID ARTHRITIS INVOLVING MULTIPLE SITES WITH POSITIVE RHEUMATOID FACTOR: Chronic | ICD-10-CM

## 2017-12-15 RX ORDER — ABATACEPT 125 MG/ML
INJECTION, SOLUTION SUBCUTANEOUS
Qty: 4 ML | Refills: 4 | Status: SHIPPED | OUTPATIENT
Start: 2017-12-15 | End: 2018-05-01 | Stop reason: SDUPTHER

## 2017-12-19 ENCOUNTER — TELEPHONE (OUTPATIENT)
Dept: RHEUMATOLOGY | Facility: CLINIC | Age: 47
End: 2017-12-19

## 2018-02-06 ENCOUNTER — TELEPHONE (OUTPATIENT)
Dept: RHEUMATOLOGY | Facility: CLINIC | Age: 48
End: 2018-02-06

## 2018-02-06 NOTE — TELEPHONE ENCOUNTER
Patient states that 1 month ago her hands were swollen and tingling . This went away. Last night  She this happen again . Also her left foot the toes went numb. Today the hands look normal except for the finger tips are freezing and purple in color. Please advise.

## 2018-02-06 NOTE — TELEPHONE ENCOUNTER
Sounds like this could be raynauds  Have her add baby Asprin 81 mg and vit E 4000 units a day see if this helps color changes in her finger   Let us know if this worsens we can see her in clinic    Tell her about core warming, run fingers under warm water, wear glove, put them under her armpits etc

## 2018-02-06 NOTE — TELEPHONE ENCOUNTER
----- Message from Candy Yen sent at 2/6/2018  9:49 AM CST -----  Contact: PT   Call pt regarding having tingling in hands or feet and want to talk to the nurse right away. Pt states that she is not sure what going on.     .[phone] (home)

## 2018-02-13 ENCOUNTER — PATIENT MESSAGE (OUTPATIENT)
Dept: RHEUMATOLOGY | Facility: CLINIC | Age: 48
End: 2018-02-13

## 2018-02-15 ENCOUNTER — PATIENT MESSAGE (OUTPATIENT)
Dept: RHEUMATOLOGY | Facility: CLINIC | Age: 48
End: 2018-02-15

## 2018-02-19 ENCOUNTER — PATIENT MESSAGE (OUTPATIENT)
Dept: RHEUMATOLOGY | Facility: CLINIC | Age: 48
End: 2018-02-19

## 2018-02-21 ENCOUNTER — OFFICE VISIT (OUTPATIENT)
Dept: RHEUMATOLOGY | Facility: CLINIC | Age: 48
End: 2018-02-21
Payer: COMMERCIAL

## 2018-02-21 ENCOUNTER — LAB VISIT (OUTPATIENT)
Dept: LAB | Facility: HOSPITAL | Age: 48
End: 2018-02-21
Attending: PHYSICIAN ASSISTANT
Payer: COMMERCIAL

## 2018-02-21 VITALS
SYSTOLIC BLOOD PRESSURE: 106 MMHG | HEART RATE: 72 BPM | WEIGHT: 142.19 LBS | DIASTOLIC BLOOD PRESSURE: 64 MMHG | HEIGHT: 67 IN | BODY MASS INDEX: 22.32 KG/M2

## 2018-02-21 DIAGNOSIS — R20.0 NUMBNESS OF TOES: ICD-10-CM

## 2018-02-21 DIAGNOSIS — Z51.81 MEDICATION MONITORING ENCOUNTER: Chronic | ICD-10-CM

## 2018-02-21 DIAGNOSIS — D84.9 IMMUNOCOMPROMISED: ICD-10-CM

## 2018-02-21 DIAGNOSIS — M05.79 RHEUMATOID ARTHRITIS INVOLVING MULTIPLE SITES WITH POSITIVE RHEUMATOID FACTOR: Primary | ICD-10-CM

## 2018-02-21 DIAGNOSIS — Z51.81 MEDICATION MONITORING ENCOUNTER: ICD-10-CM

## 2018-02-21 DIAGNOSIS — M05.79 RHEUMATOID ARTHRITIS INVOLVING MULTIPLE SITES WITH POSITIVE RHEUMATOID FACTOR: Chronic | ICD-10-CM

## 2018-02-21 LAB
ALBUMIN SERPL BCP-MCNC: 4.3 G/DL
ALP SERPL-CCNC: 82 U/L
ALT SERPL W/O P-5'-P-CCNC: 27 U/L
ANION GAP SERPL CALC-SCNC: 12 MMOL/L
AST SERPL-CCNC: 24 U/L
BASOPHILS # BLD AUTO: 0.05 K/UL
BASOPHILS NFR BLD: 0.9 %
BILIRUB SERPL-MCNC: 0.3 MG/DL
BUN SERPL-MCNC: 33 MG/DL
CALCIUM SERPL-MCNC: 9.7 MG/DL
CCP AB SER IA-ACNC: 125.7 U/ML
CHLORIDE SERPL-SCNC: 105 MMOL/L
CO2 SERPL-SCNC: 24 MMOL/L
CREAT SERPL-MCNC: 1.1 MG/DL
CRP SERPL-MCNC: 0.3 MG/L
DIFFERENTIAL METHOD: ABNORMAL
EOSINOPHIL # BLD AUTO: 0.1 K/UL
EOSINOPHIL NFR BLD: 2.1 %
ERYTHROCYTE [DISTWIDTH] IN BLOOD BY AUTOMATED COUNT: 12.3 %
ERYTHROCYTE [SEDIMENTATION RATE] IN BLOOD BY WESTERGREN METHOD: 1 MM/HR
EST. GFR  (AFRICAN AMERICAN): >60 ML/MIN/1.73 M^2
EST. GFR  (NON AFRICAN AMERICAN): 59 ML/MIN/1.73 M^2
GLUCOSE SERPL-MCNC: 98 MG/DL
HCT VFR BLD AUTO: 40.8 %
HGB BLD-MCNC: 13.8 G/DL
LYMPHOCYTES # BLD AUTO: 1.6 K/UL
LYMPHOCYTES NFR BLD: 30.2 %
MCH RBC QN AUTO: 31.9 PG
MCHC RBC AUTO-ENTMCNC: 33.8 G/DL
MCV RBC AUTO: 94 FL
MONOCYTES # BLD AUTO: 0.4 K/UL
MONOCYTES NFR BLD: 6.5 %
NEUTROPHILS # BLD AUTO: 3.2 K/UL
NEUTROPHILS NFR BLD: 60.3 %
PLATELET # BLD AUTO: 247 K/UL
PMV BLD AUTO: 10.5 FL
POTASSIUM SERPL-SCNC: 4.6 MMOL/L
PROT SERPL-MCNC: 7 G/DL
RBC # BLD AUTO: 4.33 M/UL
SODIUM SERPL-SCNC: 141 MMOL/L
WBC # BLD AUTO: 5.36 K/UL

## 2018-02-21 PROCEDURE — 80053 COMPREHEN METABOLIC PANEL: CPT | Mod: PO

## 2018-02-21 PROCEDURE — 85025 COMPLETE CBC W/AUTO DIFF WBC: CPT | Mod: PO

## 2018-02-21 PROCEDURE — 3008F BODY MASS INDEX DOCD: CPT | Mod: S$GLB,,, | Performed by: PHYSICIAN ASSISTANT

## 2018-02-21 PROCEDURE — 85651 RBC SED RATE NONAUTOMATED: CPT | Mod: PO

## 2018-02-21 PROCEDURE — 99999 PR PBB SHADOW E&M-EST. PATIENT-LVL III: CPT | Mod: PBBFAC,,, | Performed by: PHYSICIAN ASSISTANT

## 2018-02-21 PROCEDURE — 36415 COLL VENOUS BLD VENIPUNCTURE: CPT | Mod: PO

## 2018-02-21 PROCEDURE — 86200 CCP ANTIBODY: CPT

## 2018-02-21 PROCEDURE — 99214 OFFICE O/P EST MOD 30 MIN: CPT | Mod: S$GLB,,, | Performed by: PHYSICIAN ASSISTANT

## 2018-02-21 PROCEDURE — 86140 C-REACTIVE PROTEIN: CPT

## 2018-02-21 ASSESSMENT — CLINICAL DISEASE ACTIVITY INDEX (CDAI)
PHYSICIAN_ASSESSMENT: 0
PATIENT_ASSESSMENT: 4
TENDER_JOINTS_COUNT: 0
SWOLLEN_JOINTS_COUNT: 0
TOTAL_SCORE: 4

## 2018-02-21 ASSESSMENT — ROUTINE ASSESSMENT OF PATIENT INDEX DATA (RAPID3): MDHAQ FUNCTION SCORE: 0

## 2018-02-21 NOTE — PATIENT INSTRUCTIONS
Orencia every 2 weeks    Take pics of toe/fingers    If numbness worsens we can schedule EMG    Will send labs to you    Asa 81 mg/day for raynauds

## 2018-02-21 NOTE — PROGRESS NOTES
"Subjective:       Patient ID: Elizabeth Johns is a 47 y.o. female.    Chief Complaint: No chief complaint on file.    Elizabeth is here today as an urgent visit She has seropositive (+ccp) nonerosive rheumatoid arthritis. She has been on orencia for several years. She has been doing well and She has been able to de-escalate down to 1 injection every 3 weeks. Failed mtx monotherapy in the past. Off mtx due to side effects (fatigue and nausea). She is not on prednisone. In the past failed humira, enbrel and xeljanz these just did not help  Uses pennsaid topical bid prn this helps. High titers CCP positive but sedimentation rate and CRP have been normal.  Rheumatoid factor negative.       Today she is here complaining of numbness on and off in her left 4th toe for the last two weeks. She also says her toe would turn purple.  She had pain in her feet with walking.  She also reports an episode of swelling to her right hand with numbness one time.  She feels like her hands do hurt her a little more but no specific joint swelling.  She rates her hand pain 4/10.        Review of Systems   Constitutional: Negative for chills, fatigue and fever.   HENT: Negative for mouth sores, rhinorrhea and sore throat.    Eyes: Negative for pain and redness.   Respiratory: Negative for cough and shortness of breath.    Cardiovascular: Negative for chest pain.   Gastrointestinal: Negative for abdominal pain, constipation, diarrhea, nausea and vomiting.   Genitourinary: Negative for dysuria and hematuria.   Musculoskeletal: Positive for arthralgias. Negative for joint swelling and myalgias.   Skin: Positive for color change. Negative for rash.   Neurological: Positive for numbness (left 4th toe). Negative for weakness and headaches.   Psychiatric/Behavioral: The patient is not nervous/anxious.          Objective:   /64 (BP Location: Left arm, Patient Position: Sitting, BP Method: Small (Automatic))   Pulse 72   Ht 5' 7" (1.702 m)   " Wt 64.5 kg (142 lb 3.2 oz)   BMI 22.27 kg/m²      Physical Exam   Constitutional: She is oriented to person, place, and time and well-developed, well-nourished, and in no distress.   HENT:   Head: Normocephalic and atraumatic.   Eyes: Pupils are equal, round, and reactive to light. Right eye exhibits no discharge.   Neck: Normal range of motion.   Cardiovascular: Normal rate, regular rhythm and normal heart sounds.  Exam reveals no friction rub.    Pulmonary/Chest: Effort normal and breath sounds normal. No respiratory distress.   Abdominal: Soft. She exhibits no distension. There is no tenderness.   Lymphadenopathy:     She has no cervical adenopathy.   Neurological: She is alert and oriented to person, place, and time.   Skin: Skin is warm. No rash noted. No erythema.     Fingertips are warm   Psychiatric: Mood normal.   Musculoskeletal: Normal range of motion. She exhibits no edema or deformity.   meenakshi wrists, mcps, pips no synvoitis, no tenderness, no warmth, good rom  meenakshi elbows shoulders no swelling or tenderness,full rom  meenakshi knees no effusion, no warmth, no tenderness, full rom    Left foot 4th toe appears normal           Recent Results (from the past 168 hour(s))   CBC auto differential    Collection Time: 02/21/18  2:15 PM   Result Value Ref Range    WBC 5.36 3.90 - 12.70 K/uL    RBC 4.33 4.00 - 5.40 M/uL    Hemoglobin 13.8 12.0 - 16.0 g/dL    Hematocrit 40.8 37.0 - 48.5 %    MCV 94 82 - 98 fL    MCH 31.9 (H) 27.0 - 31.0 pg    MCHC 33.8 32.0 - 36.0 g/dL    RDW 12.3 11.5 - 14.5 %    Platelets 247 150 - 350 K/uL    MPV 10.5 9.2 - 12.9 fL    Gran # (ANC) 3.2 1.8 - 7.7 K/uL    Lymph # 1.6 1.0 - 4.8 K/uL    Mono # 0.4 0.3 - 1.0 K/uL    Eos # 0.1 0.0 - 0.5 K/uL    Baso # 0.05 0.00 - 0.20 K/uL    Gran% 60.3 38.0 - 73.0 %    Lymph% 30.2 18.0 - 48.0 %    Mono% 6.5 4.0 - 15.0 %    Eosinophil% 2.1 0.0 - 8.0 %    Basophil% 0.9 0.0 - 1.9 %    Differential Method Automated    Comprehensive metabolic panel    Collection  Time: 02/21/18  2:15 PM   Result Value Ref Range    Sodium 141 136 - 145 mmol/L    Potassium 4.6 3.5 - 5.1 mmol/L    Chloride 105 95 - 110 mmol/L    CO2 24 23 - 29 mmol/L    Glucose 98 70 - 110 mg/dL    BUN, Bld 33 (H) 6 - 20 mg/dL    Creatinine 1.1 0.5 - 1.4 mg/dL    Calcium 9.7 8.7 - 10.5 mg/dL    Total Protein 7.0 6.0 - 8.4 g/dL    Albumin 4.3 3.5 - 5.2 g/dL    Total Bilirubin 0.3 0.1 - 1.0 mg/dL    Alkaline Phosphatase 82 55 - 135 U/L    AST 24 10 - 40 U/L    ALT 27 10 - 44 U/L    Anion Gap 12 8 - 16 mmol/L    eGFR if African American >60 >60 mL/min/1.73 m^2    eGFR if non African American 59 (A) >60 mL/min/1.73 m^2   Sedimentation rate, manual    Collection Time: 02/21/18  2:15 PM   Result Value Ref Range    Sed Rate 1 0 - 20 mm/Hr       Assessment:       1. Rheumatoid arthritis involving multiple sites with positive rheumatoid factor    2. Medication monitoring encounter    3. Immunocompromised    4. Numbness of toes        Impression:  1.  Seropositive rheumatoid arthritis - mhaq 0, cdai 4 no swollen tender joints, currently on orencia 125mg SQ every 3 weeks with recent increase in hand and foot pain, episode of hand swelling, some numbness to L 4th great toe with color change--could be developing raynauds?  Off oral methotrexate because of side effects  In the past failed methotrexate monotherapy, failed Humira, Enbrel and Xeljanz    2. Numbness: localized to left great toe, unclear cause, x 2 weeks could be inflammation, she reports color change as well, question of raynauds     3. Immunocompromised:  Vaccines up-to-date      4.  Medication monitoring with no toxicity side effects, chronic immunosuppression with no recurrent infections        Plan:       Reassurance, at the moment I think we just watch her symptoms, if she notices worsening color change with temperature changes in her hands and feet would rec asa 81mg for raynauds, rec she take pics  If numbness worsens, spreads, consider emg, I don't think  we need to do this yet  Discussed using an antiinflammatory right now, aleve  Move orencia back to q 2 weeks see if this helps her hand and foot pain at all  Will check inflammatory markers from today but suspect they will be normal    rtc in 3 mos with sukhi for routine follow up

## 2018-03-08 ENCOUNTER — PATIENT MESSAGE (OUTPATIENT)
Dept: RHEUMATOLOGY | Facility: CLINIC | Age: 48
End: 2018-03-08

## 2018-03-09 ENCOUNTER — OFFICE VISIT (OUTPATIENT)
Dept: RHEUMATOLOGY | Facility: CLINIC | Age: 48
End: 2018-03-09
Payer: COMMERCIAL

## 2018-03-09 VITALS
HEIGHT: 67 IN | WEIGHT: 142 LBS | BODY MASS INDEX: 22.29 KG/M2 | HEART RATE: 61 BPM | DIASTOLIC BLOOD PRESSURE: 65 MMHG | SYSTOLIC BLOOD PRESSURE: 104 MMHG

## 2018-03-09 DIAGNOSIS — Z51.81 MEDICATION MONITORING ENCOUNTER: ICD-10-CM

## 2018-03-09 DIAGNOSIS — D84.9 IMMUNOCOMPROMISED: ICD-10-CM

## 2018-03-09 DIAGNOSIS — R22.31 LOCALIZED SWELLING ON RIGHT HAND: ICD-10-CM

## 2018-03-09 DIAGNOSIS — M05.79 RHEUMATOID ARTHRITIS INVOLVING MULTIPLE SITES WITH POSITIVE RHEUMATOID FACTOR: Primary | Chronic | ICD-10-CM

## 2018-03-09 PROCEDURE — 99999 PR PBB SHADOW E&M-EST. PATIENT-LVL IV: CPT | Mod: PBBFAC,,, | Performed by: PHYSICIAN ASSISTANT

## 2018-03-09 PROCEDURE — 96372 THER/PROPH/DIAG INJ SC/IM: CPT | Mod: S$GLB,,, | Performed by: INTERNAL MEDICINE

## 2018-03-09 PROCEDURE — 99214 OFFICE O/P EST MOD 30 MIN: CPT | Mod: 25,S$GLB,, | Performed by: PHYSICIAN ASSISTANT

## 2018-03-09 RX ORDER — METHYLPREDNISOLONE 4 MG/1
TABLET ORAL
Qty: 1 PACKAGE | Refills: 2 | Status: SHIPPED | OUTPATIENT
Start: 2018-03-09 | End: 2018-05-23 | Stop reason: ALTCHOICE

## 2018-03-09 RX ORDER — HYDROCODONE BITARTRATE AND ACETAMINOPHEN 7.5; 325 MG/1; MG/1
1 TABLET ORAL EVERY 6 HOURS PRN
Qty: 30 TABLET | Refills: 0 | Status: SHIPPED | OUTPATIENT
Start: 2018-03-09 | End: 2018-04-08

## 2018-03-09 RX ORDER — BETAMETHASONE SODIUM PHOSPHATE AND BETAMETHASONE ACETATE 3; 3 MG/ML; MG/ML
6 INJECTION, SUSPENSION INTRA-ARTICULAR; INTRALESIONAL; INTRAMUSCULAR; SOFT TISSUE
Status: COMPLETED | OUTPATIENT
Start: 2018-03-09 | End: 2018-03-09

## 2018-03-09 RX ADMIN — BETAMETHASONE SODIUM PHOSPHATE AND BETAMETHASONE ACETATE 6 MG: 3; 3 INJECTION, SUSPENSION INTRA-ARTICULAR; INTRALESIONAL; INTRAMUSCULAR; SOFT TISSUE at 03:03

## 2018-03-09 ASSESSMENT — ROUTINE ASSESSMENT OF PATIENT INDEX DATA (RAPID3): MDHAQ FUNCTION SCORE: .3

## 2018-03-09 NOTE — PROGRESS NOTES
"Subjective:       Patient ID: Elizabeth Johns is a 47 y.o. female.    Chief Complaint: Rheumatoid Arthritis    Elizabeth is here today as an urgent visit for hand pain/swelling. I saw her 2 wks ago and she was worried about numbness in her left 4th toe.  She has seropositive (+ccp) nonerosive rheumatoid arthritis. She has been on orencia for several years. She has been doing well and She has been able to de-escalate down to 1 injection every 3 weeks. Last visit we moved Orencia back up to q 2 wks.  Failed mtx monotherapy in the past. Off mtx due to side effects (fatigue and nausea). She is not on prednisone. In the past failed humira, enbrel and xeljanz these just did not help  Uses pennsaid topical bid prn. High titers CCP positive but sedimentation rate and CRP have been normal.  Rheumatoid factor negative.       She is here today complaining of 10/10 pain in her right hand. She woke up this am with swelling in her right hand. She c/o throbbing pain and some tingling in her hand. Her toe is still bothering her, pain with walking at times .        Review of Systems   Constitutional: Negative for chills, fatigue and fever.   HENT: Negative for mouth sores, rhinorrhea and sore throat.    Eyes: Negative for pain and redness.   Respiratory: Negative for cough and shortness of breath.    Cardiovascular: Negative for chest pain.   Gastrointestinal: Negative for abdominal pain, constipation, diarrhea, nausea and vomiting.   Genitourinary: Negative for dysuria and hematuria.   Musculoskeletal: Positive for arthralgias and joint swelling. Negative for myalgias.   Skin: Positive for color change. Negative for rash.   Neurological: Positive for numbness (left 4th toe). Negative for weakness and headaches.   Psychiatric/Behavioral: The patient is not nervous/anxious.          Objective:   /65   Pulse 61   Ht 5' 7" (1.702 m)   Wt 64.4 kg (142 lb)   BMI 22.24 kg/m²      Physical Exam   Constitutional: She is oriented to " person, place, and time and well-developed, well-nourished, and in no distress.   HENT:   Head: Normocephalic and atraumatic.   Eyes: Pupils are equal, round, and reactive to light. Right eye exhibits no discharge.   Neck: Normal range of motion.   Cardiovascular: Normal rate, regular rhythm and normal heart sounds.  Exam reveals no friction rub.    Pulmonary/Chest: Effort normal and breath sounds normal. No respiratory distress.   Abdominal: Soft. She exhibits no distension. There is no tenderness.   Lymphadenopathy:     She has no cervical adenopathy.   Neurological: She is alert and oriented to person, place, and time.   Skin: Skin is warm. No rash noted. No erythema.     Fingertips are warm   Psychiatric: Mood normal.   Musculoskeletal: Normal range of motion. She exhibits no edema or deformity.   Right wrist swelling on the volar radial aspect with tenderness  Right hand generalized swelling to her fingers gabino 2,3 without tenderness    Left hand normal  meenakshi elbows shoulders no swelling or tenderness,full rom  meenakshi knees no effusion, no warmth, no tenderness, full rom    Left foot 4th toe appears normal, no tenderness to palpation at the mtp or ip joint           No results found for this or any previous visit (from the past 168 hour(s)).          Assessment:       1. Rheumatoid arthritis involving multiple sites with positive rheumatoid factor    2. Localized swelling on right hand    3. Medication monitoring encounter    4. Immunocompromised        Impression:  Seropositive rheumatoid arthritis - acute exacerbation of swelling in the right wrist and hand,  currently on orencia 125mg SQ every 2 weeks,   Off oral methotrexate because of side effects  In the past failed methotrexate monotherapy, failed Humira, Enbrel and Xeljanz    Immunocompromised:  Vaccines up-to-date      Medication monitoring with no toxicity side effects, chronic immunosuppression with no recurrent infections      Plan:       IM celestone  today 6mg  Medrol dose pack start tmrw  rec ice and elevation for her right hand  Go back to weekly orencia SC injections    See dr. Childers in 3 weeks for recheck then see sukhi in may routine follow up  rtc in 2 mos with sukhi for routine follow up

## 2018-03-09 NOTE — PROGRESS NOTES
Administered 1 cc Betamethasone 6mg/cc  to left ventrogluteal. Pt tolerated well. No acute reaction noted to site. Pt instructed on S/S to report. Advised patient to wait in lobby 15 minutes after receiving injection to monitor for any reactions. Pt verbalized understanding.     Lot: [id number]  Exp: 07/19

## 2018-03-09 NOTE — PATIENT INSTRUCTIONS
Weekly orencia   Steroid shot   Steroid dose pack to start tomorrow     May need to add something back by mouth

## 2018-03-26 ENCOUNTER — OFFICE VISIT (OUTPATIENT)
Dept: RHEUMATOLOGY | Facility: CLINIC | Age: 48
End: 2018-03-26
Payer: COMMERCIAL

## 2018-03-26 VITALS
DIASTOLIC BLOOD PRESSURE: 74 MMHG | HEART RATE: 69 BPM | SYSTOLIC BLOOD PRESSURE: 112 MMHG | WEIGHT: 138.69 LBS | HEIGHT: 67 IN | BODY MASS INDEX: 21.77 KG/M2

## 2018-03-26 DIAGNOSIS — Z51.81 MEDICATION MONITORING ENCOUNTER: Chronic | ICD-10-CM

## 2018-03-26 DIAGNOSIS — M05.712 RHEUMATOID ARTHRITIS INVOLVING LEFT SHOULDER WITH POSITIVE RHEUMATOID FACTOR: Primary | ICD-10-CM

## 2018-03-26 DIAGNOSIS — F41.9 ANXIETY TENSION STATE: ICD-10-CM

## 2018-03-26 PROCEDURE — 99999 PR PBB SHADOW E&M-EST. PATIENT-LVL III: CPT | Mod: PBBFAC,,, | Performed by: INTERNAL MEDICINE

## 2018-03-26 PROCEDURE — 99214 OFFICE O/P EST MOD 30 MIN: CPT | Mod: S$GLB,,, | Performed by: INTERNAL MEDICINE

## 2018-03-26 ASSESSMENT — ROUTINE ASSESSMENT OF PATIENT INDEX DATA (RAPID3): MDHAQ FUNCTION SCORE: 0

## 2018-03-26 NOTE — PATIENT INSTRUCTIONS
Compression glove with any flares along with elevation and cool compresses    orencia every 10 days for now    Exercise for stress    Call if any flares  That lpersist

## 2018-03-26 NOTE — PROGRESS NOTES
RHEUM FOLLOWUP    CHIEF COMPLAINT:  Rheumatoid arthritis.    PERTINENT HISTORY:  Ms. Johns was seen  As an  emergency on 03/09/2018 by   Ramila.  She had a flare involving her right hand with marked swelling and   tenderness.  She was given Solu-Medrol and told to follow up with me to see   where we could go.  She has been on Orencia using it about every 14 days subcu.    She denies fever, chills, or sweats.  She denies any reactions to Orencia that   she knows.  Dose is 125 mg with each shot.  She does use Pennsaid occasionally   to local areas and she is on supplements of omega-3 and occasionally turmeric.    She is very active and busy and does not like to get interrupted with her normal   daily activities, so this was big aggravation for her.  Note, she has failed   several biologics as well as Xeljanz and methotrexate in the past.    REVIEW OF SYSTEMS:  GENERAL:  No fever, chills, or sweats.  Weight is stable.  Very active.  HEAD:  No headache or recent head trauma.  EYES:  Visual acuity fine.  No ocular pain or redness.  EARS:  Denies ear pain, discharge or vertigo.  NOSE:  No loss of smell, no epistaxis or post nasal drip.  MOUTH AND THROAT:  No hoarseness or change in voice or oral ulcers.  No   difficulty swallowing or dryness.  CHEST:  Denies shortness of breath.  MCGILL, cyanosis, wheezing, cough and sputum   production.  CARDIOVASCULAR:  Denies chest pain, PND, orthopnea or reduced exercise   tolerance.  ABDOMEN:  No weight loss.  Denies nausea, vomiting, diarrhea, abdominal pain,   hematemesis or blood in stool.  SKIN:  Clear with no nodules.  VASCULAR:  Normal.  NEUROLOGIC:  Normal.  PSYCHIATRIC:  Normal.    Past Medical History:   Diagnosis Date    Arthritis     Rheumatoid     Past Surgical History:   Procedure Laterality Date    breast implant removel      EYE SURGERY      nova sure       Family History   Problem Relation Age of Onset    Rheum arthritis Mother      Social History     Social  History    Marital status:      Spouse name: N/A    Number of children: N/A    Years of education: N/A     Occupational History    Not on file.     Social History Main Topics    Smoking status: Never Smoker    Smokeless tobacco: Never Used    Alcohol use No    Drug use: No    Sexual activity: Not on file     Other Topics Concern    Not on file     Social History Narrative    No narrative on file     Review of patient's allergies indicates:  No Known Allergies    PHYSICAL EXAM:  VITAL SIGNS:  Normal vital signs.  Blood pressure 112/74, weight 62 kg, height   67 inches, pulse 69, pain score 0 now.  MUSCULOSKELETAL:  Right hand exam shows resolution of heat, redness and swelling   involving the wrist and MP joints.  She does have a picture taken before and   compared to.  Left hand normal.  Elbows and shoulders have good motion today,   they are not tender.  Hips and knees normal motion.  MTPs are not tender.  Skin   is clear.  Vascular is normal.    LABS:  Lab from her last testing in February with sed rate 1.  CBC normal.    Electrolytes good.  C3 antibodies remain high at 125.  X-rays reviewed from   December, no erosive changes in the hands or feet.  BHASKAR score today is 0.    IMPRESSION:  1.  Rheumatoid arthritis with recent flare - controlled after Celestone and   Medrol Dosepak.  We are not getting quite enough Orencia regularly to keep in   control.  2.  Medication monitoring of Orencia, no toxicity issues.  3.  Anxiety stress, it is situational and chronic.  She says that is what flares   her disease.    PLAN:  1.  Increase her Orencia to 125 mg every 10 days.  2.  Remain off steroids.  3.  She has a mild flare, so I will try and use compression gloves to the hand   along with elevation and cool compresses and increase her dose of turmeric.    Plan to keep her appointment in May with Niurka with routine lab.          /pieter [id number] ness(s)        VIRGINIA/ADEEL  dd: 03/26/2018 11:56:25 (CDT)  td: 03/27/2018  03:00:09 (CDT)  Doc ID   #[id number]  Job ID #[id number]    CC:

## 2018-04-04 ENCOUNTER — PATIENT MESSAGE (OUTPATIENT)
Dept: RHEUMATOLOGY | Facility: CLINIC | Age: 48
End: 2018-04-04

## 2018-04-18 ENCOUNTER — PATIENT MESSAGE (OUTPATIENT)
Dept: RHEUMATOLOGY | Facility: CLINIC | Age: 48
End: 2018-04-18

## 2018-05-01 ENCOUNTER — PATIENT MESSAGE (OUTPATIENT)
Dept: RHEUMATOLOGY | Facility: CLINIC | Age: 48
End: 2018-05-01

## 2018-05-01 DIAGNOSIS — M05.79 RHEUMATOID ARTHRITIS INVOLVING MULTIPLE SITES WITH POSITIVE RHEUMATOID FACTOR: Chronic | ICD-10-CM

## 2018-05-01 RX ORDER — ABATACEPT 125 MG/ML
INJECTION, SOLUTION SUBCUTANEOUS
Qty: 4 ML | Refills: 1 | Status: SHIPPED | OUTPATIENT
Start: 2018-05-01 | End: 2018-07-15 | Stop reason: SDUPTHER

## 2018-05-21 ENCOUNTER — LAB VISIT (OUTPATIENT)
Dept: LAB | Facility: HOSPITAL | Age: 48
End: 2018-05-21
Attending: INTERNAL MEDICINE
Payer: COMMERCIAL

## 2018-05-21 DIAGNOSIS — M05.79 RHEUMATOID ARTHRITIS INVOLVING MULTIPLE SITES WITH POSITIVE RHEUMATOID FACTOR: Chronic | ICD-10-CM

## 2018-05-21 DIAGNOSIS — Z51.81 MEDICATION MONITORING ENCOUNTER: Chronic | ICD-10-CM

## 2018-05-21 LAB
ALBUMIN SERPL BCP-MCNC: 4.1 G/DL
ALP SERPL-CCNC: 87 U/L
ALT SERPL W/O P-5'-P-CCNC: 19 U/L
ANION GAP SERPL CALC-SCNC: 11 MMOL/L
AST SERPL-CCNC: 21 U/L
BASOPHILS # BLD AUTO: 0.06 K/UL
BASOPHILS NFR BLD: 1.4 %
BILIRUB SERPL-MCNC: 0.4 MG/DL
BUN SERPL-MCNC: 31 MG/DL
CALCIUM SERPL-MCNC: 9.5 MG/DL
CHLORIDE SERPL-SCNC: 104 MMOL/L
CO2 SERPL-SCNC: 25 MMOL/L
CREAT SERPL-MCNC: 0.9 MG/DL
CRP SERPL-MCNC: 0.3 MG/L
DIFFERENTIAL METHOD: ABNORMAL
EOSINOPHIL # BLD AUTO: 0.1 K/UL
EOSINOPHIL NFR BLD: 2.3 %
ERYTHROCYTE [DISTWIDTH] IN BLOOD BY AUTOMATED COUNT: 12.1 %
ERYTHROCYTE [SEDIMENTATION RATE] IN BLOOD BY WESTERGREN METHOD: 1 MM/HR
EST. GFR  (AFRICAN AMERICAN): >60 ML/MIN/1.73 M^2
EST. GFR  (NON AFRICAN AMERICAN): >60 ML/MIN/1.73 M^2
GLUCOSE SERPL-MCNC: 110 MG/DL
HCT VFR BLD AUTO: 39.7 %
HGB BLD-MCNC: 13.6 G/DL
LYMPHOCYTES # BLD AUTO: 1.9 K/UL
LYMPHOCYTES NFR BLD: 44.7 %
MCH RBC QN AUTO: 32.2 PG
MCHC RBC AUTO-ENTMCNC: 34.3 G/DL
MCV RBC AUTO: 94 FL
MONOCYTES # BLD AUTO: 0.4 K/UL
MONOCYTES NFR BLD: 8.2 %
NEUTROPHILS # BLD AUTO: 1.9 K/UL
NEUTROPHILS NFR BLD: 43.4 %
PLATELET # BLD AUTO: 176 K/UL
PMV BLD AUTO: 10.6 FL
POTASSIUM SERPL-SCNC: 4.2 MMOL/L
PROT SERPL-MCNC: 6.5 G/DL
RBC # BLD AUTO: 4.23 M/UL
SODIUM SERPL-SCNC: 140 MMOL/L
WBC # BLD AUTO: 4.27 K/UL

## 2018-05-21 PROCEDURE — 36415 COLL VENOUS BLD VENIPUNCTURE: CPT | Mod: PO

## 2018-05-21 PROCEDURE — 80053 COMPREHEN METABOLIC PANEL: CPT | Mod: PO

## 2018-05-21 PROCEDURE — 86140 C-REACTIVE PROTEIN: CPT

## 2018-05-21 PROCEDURE — 85651 RBC SED RATE NONAUTOMATED: CPT | Mod: PO

## 2018-05-21 PROCEDURE — 85025 COMPLETE CBC W/AUTO DIFF WBC: CPT | Mod: PO

## 2018-05-23 ENCOUNTER — OFFICE VISIT (OUTPATIENT)
Dept: RHEUMATOLOGY | Facility: CLINIC | Age: 48
End: 2018-05-23
Payer: COMMERCIAL

## 2018-05-23 VITALS
BODY MASS INDEX: 21.25 KG/M2 | WEIGHT: 140.19 LBS | DIASTOLIC BLOOD PRESSURE: 56 MMHG | SYSTOLIC BLOOD PRESSURE: 104 MMHG | HEIGHT: 68 IN | HEART RATE: 57 BPM

## 2018-05-23 DIAGNOSIS — M25.562 CHRONIC PAIN OF LEFT KNEE: ICD-10-CM

## 2018-05-23 DIAGNOSIS — D84.9 IMMUNOCOMPROMISED: ICD-10-CM

## 2018-05-23 DIAGNOSIS — Z51.81 MEDICATION MONITORING ENCOUNTER: Chronic | ICD-10-CM

## 2018-05-23 DIAGNOSIS — G89.29 CHRONIC PAIN OF LEFT KNEE: ICD-10-CM

## 2018-05-23 DIAGNOSIS — M25.561 CHRONIC PAIN OF RIGHT KNEE: ICD-10-CM

## 2018-05-23 DIAGNOSIS — M05.79 RHEUMATOID ARTHRITIS INVOLVING MULTIPLE SITES WITH POSITIVE RHEUMATOID FACTOR: Primary | Chronic | ICD-10-CM

## 2018-05-23 DIAGNOSIS — G89.29 CHRONIC PAIN OF RIGHT KNEE: ICD-10-CM

## 2018-05-23 PROCEDURE — 99214 OFFICE O/P EST MOD 30 MIN: CPT | Mod: S$GLB,,, | Performed by: PHYSICIAN ASSISTANT

## 2018-05-23 PROCEDURE — 99999 PR PBB SHADOW E&M-EST. PATIENT-LVL III: CPT | Mod: PBBFAC,,, | Performed by: PHYSICIAN ASSISTANT

## 2018-05-23 PROCEDURE — 3008F BODY MASS INDEX DOCD: CPT | Mod: CPTII,S$GLB,, | Performed by: PHYSICIAN ASSISTANT

## 2018-05-23 RX ORDER — MELOXICAM 7.5 MG/1
7.5 TABLET ORAL DAILY
Qty: 30 TABLET | Refills: 6 | Status: SHIPPED | OUTPATIENT
Start: 2018-05-23 | End: 2018-06-25

## 2018-05-23 RX ORDER — DICLOFENAC SODIUM 10 MG/G
2 GEL TOPICAL 4 TIMES DAILY
Qty: 300 G | Refills: 6 | Status: SHIPPED | OUTPATIENT
Start: 2018-05-23 | End: 2021-10-08

## 2018-05-23 ASSESSMENT — CLINICAL DISEASE ACTIVITY INDEX (CDAI)
SWOLLEN_JOINTS_COUNT: 2
TOTAL_SCORE: 8
PHYSICIAN_ASSESSMENT: 2
PATIENT_ASSESSMENT: 2
TENDER_JOINTS_COUNT: 2

## 2018-05-23 ASSESSMENT — ROUTINE ASSESSMENT OF PATIENT INDEX DATA (RAPID3): MDHAQ FUNCTION SCORE: 0

## 2018-05-23 NOTE — PROGRESS NOTES
"Subjective:       Patient ID: Elizabeth Johns is a 48 y.o. female.    Chief Complaint: Rheumatoid Arthritis    Elizabeth is here today Rheum follow up.  She has seropositive (+ccp) nonerosive rheumatoid arthritis. She has been on orencia for several years. Seen in March for RA exacerbation. Her Orencia injection was out to every 14 days. She was given a medrol dose pack and her orencia was changed to every 10 days. Her joints have improved but she is still having some stiffness and swelling across meenakshi pip joints gabino in the morning that is the worst time. Stiffness lasting a few hours at times. Still some mild swelling today. Tried arthritis  compression gloves but the one's she bought over the counter did not help. Tried otc motrin, naproxen with no help. Celebrex caused weight gain.     Failed mtx monotherapy in the past. Off mtx due to side effects (fatigue and nausea). She is not on prednisone. In the past failed humira, enbrel and xeljanz these just did not help  Uses pennsaid topical bid prn. High titers CCP positive but sedimentation rate and CRP have been normal.  Rheumatoid factor negative.             Review of Systems   Constitutional: Negative for chills, fatigue and fever.   HENT: Negative for mouth sores, rhinorrhea and sore throat.    Eyes: Negative for pain and redness.   Respiratory: Negative for cough and shortness of breath.    Cardiovascular: Negative for chest pain.   Gastrointestinal: Negative for abdominal pain, constipation, diarrhea, nausea and vomiting.   Genitourinary: Negative for dysuria and hematuria.   Musculoskeletal: Positive for arthralgias and joint swelling. Negative for myalgias.   Skin: Positive for color change. Negative for rash.   Neurological: Positive for numbness (left 4th toe). Negative for weakness and headaches.   Psychiatric/Behavioral: The patient is not nervous/anxious.          Objective:   BP (!) 104/56   Pulse (!) 57   Ht 5' 7.5" (1.715 m)   Wt 63.6 kg (140 lb " 3.4 oz)   BMI 21.64 kg/m²      Physical Exam   Constitutional: She is oriented to person, place, and time and well-developed, well-nourished, and in no distress.   HENT:   Head: Normocephalic and atraumatic.   Eyes: Pupils are equal, round, and reactive to light. Right eye exhibits no discharge.   Neck: Normal range of motion.   Cardiovascular: Normal rate, regular rhythm and normal heart sounds.  Exam reveals no friction rub.    Pulmonary/Chest: Effort normal and breath sounds normal. No respiratory distress.   Abdominal: Soft. She exhibits no distension. There is no tenderness.       Right Side Rheumatological Exam     Examination finds the shoulder, elbow, wrist, knee, 1st PIP, 2nd MCP, 3rd MCP, 4th PIP, 4th MCP, 5th PIP and 5th MCP normal.    The patient is tender to palpation of the 2nd PIP and 3rd PIP    She has swelling of the 2nd PIP and 3rd PIP    Left Side Rheumatological Exam     Examination finds the shoulder, elbow, wrist, knee, 1st PIP, 1st MCP, 2nd PIP, 2nd MCP, 3rd PIP, 3rd MCP, 4th PIP, 4th MCP, 5th PIP and 5th MCP normal.      Lymphadenopathy:     She has no cervical adenopathy.   Neurological: She is alert and oriented to person, place, and time.   Skin: Skin is warm. No rash noted. No erythema.     Fingertips are warm   Psychiatric: Mood normal.   Musculoskeletal: Normal range of motion. She exhibits edema and tenderness. She exhibits no deformity.   Right wrist swelling on the volar radial aspect with tenderness  Right hand generalized swelling to her fingers gabino 2,3 without tenderness    Left hand normal  meenakshi elbows shoulders no swelling or tenderness,full rom  meenakshi knees no effusion, no warmth, no tenderness, full rom    Left foot 4th toe appears normal, no tenderness to palpation at the mtp or ip joint           Recent Results (from the past 168 hour(s))   CBC auto differential    Collection Time: 05/21/18  2:28 PM   Result Value Ref Range    WBC 4.27 3.90 - 12.70 K/uL    RBC 4.23 4.00 - 5.40  M/uL    Hemoglobin 13.6 12.0 - 16.0 g/dL    Hematocrit 39.7 37.0 - 48.5 %    MCV 94 82 - 98 fL    MCH 32.2 (H) 27.0 - 31.0 pg    MCHC 34.3 32.0 - 36.0 g/dL    RDW 12.1 11.5 - 14.5 %    Platelets 176 150 - 350 K/uL    MPV 10.6 9.2 - 12.9 fL    Gran # (ANC) 1.9 1.8 - 7.7 K/uL    Lymph # 1.9 1.0 - 4.8 K/uL    Mono # 0.4 0.3 - 1.0 K/uL    Eos # 0.1 0.0 - 0.5 K/uL    Baso # 0.06 0.00 - 0.20 K/uL    Gran% 43.4 38.0 - 73.0 %    Lymph% 44.7 18.0 - 48.0 %    Mono% 8.2 4.0 - 15.0 %    Eosinophil% 2.3 0.0 - 8.0 %    Basophil% 1.4 0.0 - 1.9 %    Differential Method Automated    Comprehensive metabolic panel    Collection Time: 05/21/18  2:28 PM   Result Value Ref Range    Sodium 140 136 - 145 mmol/L    Potassium 4.2 3.5 - 5.1 mmol/L    Chloride 104 95 - 110 mmol/L    CO2 25 23 - 29 mmol/L    Glucose 110 70 - 110 mg/dL    BUN, Bld 31 (H) 6 - 20 mg/dL    Creatinine 0.9 0.5 - 1.4 mg/dL    Calcium 9.5 8.7 - 10.5 mg/dL    Total Protein 6.5 6.0 - 8.4 g/dL    Albumin 4.1 3.5 - 5.2 g/dL    Total Bilirubin 0.4 0.1 - 1.0 mg/dL    Alkaline Phosphatase 87 55 - 135 U/L    AST 21 10 - 40 U/L    ALT 19 10 - 44 U/L    Anion Gap 11 8 - 16 mmol/L    eGFR if African American >60 >60 mL/min/1.73 m^2    eGFR if non African American >60 >60 mL/min/1.73 m^2   C-reactive protein    Collection Time: 05/21/18  2:28 PM   Result Value Ref Range    CRP 0.3 0.0 - 8.2 mg/L   Sedimentation rate, manual    Collection Time: 05/21/18  2:28 PM   Result Value Ref Range    Sed Rate 1 0 - 20 mm/Hr         Assessment:       1. Rheumatoid arthritis involving multiple sites with positive rheumatoid factor    2. Medication monitoring encounter    3. Immunocompromised    4. Chronic pain of right knee    5. Chronic pain of left knee        Impression:  Seropositive rheumatoid arthritis - mid activity recent exacerbation not completely resolved  currently on orencia 125mg SQ every 10 days   CDAI 8, BHASKAR 0    oral methotrexate because of side effects  In the past failed  methotrexate monotherapy, failed Humira, Enbrel and Xeljanz    Immunocompromised:  Vaccines up-to-date      Medication monitoring with no toxicity side effects, chronic immunosuppression with no recurrent infections      Plan:       Add low dose meloxicam 7.5 mg at night with food, should help am stiffness/pain/swelling  Orencia Q 7-9 days, See how she does    call [facility] about their recommendation for compression arthritis gloves or send to OT [facility] at [facility] to see what glove might work best for her   The ones she bought were not helpful   Is[employer]er Therapeutic Glove Seamless Support  Have full finger and fingerless tip gloves     Topical Voltaren gel  4 g qid to both knees can use on hands/wrists also  If not helpful will go back to Pennsaid topical but insurance did not cover     rtc 3 mon sukhi with reg 4 labs

## 2018-06-11 ENCOUNTER — PATIENT MESSAGE (OUTPATIENT)
Dept: RHEUMATOLOGY | Facility: CLINIC | Age: 48
End: 2018-06-11

## 2018-06-11 DIAGNOSIS — M05.79 RHEUMATOID ARTHRITIS INVOLVING MULTIPLE SITES WITH POSITIVE RHEUMATOID FACTOR: Primary | Chronic | ICD-10-CM

## 2018-06-13 ENCOUNTER — PATIENT MESSAGE (OUTPATIENT)
Dept: RHEUMATOLOGY | Facility: CLINIC | Age: 48
End: 2018-06-13

## 2018-06-13 RX ORDER — METHYLPREDNISOLONE 4 MG/1
TABLET ORAL
Qty: 1 PACKAGE | Refills: 0 | Status: SHIPPED | OUTPATIENT
Start: 2018-06-13 | End: 2018-06-25

## 2018-06-25 ENCOUNTER — OFFICE VISIT (OUTPATIENT)
Dept: RHEUMATOLOGY | Facility: CLINIC | Age: 48
End: 2018-06-25
Payer: COMMERCIAL

## 2018-06-25 VITALS
HEART RATE: 71 BPM | DIASTOLIC BLOOD PRESSURE: 72 MMHG | SYSTOLIC BLOOD PRESSURE: 111 MMHG | HEIGHT: 67 IN | BODY MASS INDEX: 22.32 KG/M2 | WEIGHT: 142.19 LBS

## 2018-06-25 DIAGNOSIS — M79.89 BILATERAL HAND SWELLING: Chronic | ICD-10-CM

## 2018-06-25 DIAGNOSIS — Z51.81 MEDICATION MONITORING ENCOUNTER: Chronic | ICD-10-CM

## 2018-06-25 DIAGNOSIS — M05.79 RHEUMATOID ARTHRITIS INVOLVING MULTIPLE SITES WITH POSITIVE RHEUMATOID FACTOR: Primary | Chronic | ICD-10-CM

## 2018-06-25 DIAGNOSIS — D84.9 IMMUNOCOMPROMISED: ICD-10-CM

## 2018-06-25 PROCEDURE — 99999 PR PBB SHADOW E&M-EST. PATIENT-LVL III: CPT | Mod: PBBFAC,,, | Performed by: INTERNAL MEDICINE

## 2018-06-25 PROCEDURE — 99214 OFFICE O/P EST MOD 30 MIN: CPT | Mod: S$GLB,,, | Performed by: INTERNAL MEDICINE

## 2018-06-25 PROCEDURE — 3008F BODY MASS INDEX DOCD: CPT | Mod: CPTII,S$GLB,, | Performed by: INTERNAL MEDICINE

## 2018-06-25 ASSESSMENT — CLINICAL DISEASE ACTIVITY INDEX (CDAI)
TENDER_JOINTS_COUNT: 0
TOTAL_SCORE: 0
PATIENT_ASSESSMENT: 0
SWOLLEN_JOINTS_COUNT: 0
PHYSICIAN_ASSESSMENT: 0

## 2018-06-25 ASSESSMENT — ROUTINE ASSESSMENT OF PATIENT INDEX DATA (RAPID3): MDHAQ FUNCTION SCORE: 0

## 2018-06-25 NOTE — PATIENT INSTRUCTIONS
Add milk thistle twice day     Continue compression gloves    Use B6 50 mg at bedtime    Look int all the supplements you take for edema or swelling as a side effect    No change in Orencia

## 2018-06-25 NOTE — PROGRESS NOTES
RHEUMATOLOGY FOLLOWUP  CHIEF COMPLAINT:  Swelling in her hands.     PERTINENT HISTORY:  Elizabeth is waking up every morning with her hands swollen   and puffy.  She has difficulty in making a fist particularly on the left side.    Usually works its way out in 30 minutes.  She is not having any real swelling or   soreness in any other part of her body.  She rates her pain at 0 today.  She   has been on Orencia now for a good while.  She is taking 125 mg usually every   14 days now.  She is off DMARDS because of prior reactions to them, including   methotrexate.  Her last visit in May, Niurka tried her on meloxicam taking 7.5 mg   at night, that did not help and she quit it.  She has gotten a new compression   glove through [employer] and does seem to help some.  She does use topical   Voltaren already.  She is just not sure what is causing her swelling.  It does   tend to go away relatively quickly and is not associated with any persistent   pain.         REVIEW OF SYSTEMS:  GENERAL:  Her weight is stable.  She is on a ketogenic diet now, which is very   healthy.  She does use lots of supplements.  Other than her hand, she really   does not have any issues.  She asked me if she could have  gout - her dad has   gout.    HEAD:  No headache or recent head trauma.  EYES:  Visual acuity fine.  No ocular pain or redness.  EARS:  Denies ear pain, discharge or vertigo.  NOSE:  No loss of smell, no epistaxis or post nasal drip.  MOUTH & THROAT:  No hoarseness or change in voice or oral ulcers. No difficulty   swallowing or dryness.  No rashes or stomatitis etc.  & THROAT:  No hoarseness   or change in voice or oral ulcers. No difficulty swallowing or dryness.  No   rashes or stomatitis etc.    NODES:  Neck, no swelling in her palms.  CARDIAC:  Negative.  PULMONARY:   Negative.  GI:  Negative.   JOINTS:  See HPI.  SKIN:  With no rashes or nodules.  VASCULAR:  No Raynaud's.     PHYSICAL EXAMINATION:  VITAL SIGNS:  With her  weight 64 kilograms, blood pressure 111/72, height 67   inches, pulse in the 70s, pain score 0 today.  EXTREMITIES:  Hand exam today shows no synovitis.  She has normal range of   motion of her PIPs, MPs, and wrist.  There are no signs of any thickening of the   membranes around the joints, but no signs of any damage occurring, also same   for the left hand as the right hand.  Her elbows and shoulders move well.  Her   hips and knees move well.  There is no fluid in the knees.  Ankles move well.    Toes have good motion.  She has good cervical range of motion.  She has got no   tenderness in the neck.  HEENT:  Normocephalic, atraumatic, alert and oriented X3.  PERRLA.  Conjunctiva   clear, sclerae non-icteric.  No tonsillar enlargement.  No pharyngeal erythema   or exudate.  No ulcers or lesions noted.  Mucous membranes moist and pink.  Oral   pharynx clear.  Neck supple, no JVD.  Normal ROM.  Thyroid normal.  No masses   or tracheal deviation.  No cervical, axillary or inguinal lymph node   enlargement.  No rashes.  She has got no eye redness etc.  CHEST:  Lungs clear to auscultation bilaterally.  No dullness to percussion.  No   accessory muscle use.  No intercostal retraction noted.  CARDIOVASCULAR:  Normal S1, S2.  No rubs, murmurs or gallops.  No peripheral   edema.   ABDOMEN:  Bowel sounds normal, abdomen soft, not distended.  No tenderness or   masses. No hepatosplenomegaly.  MUSCULOSKELETAL:  Strength is normal.    LABORATORY DATA AND DIAGNOSTIC STUDIES:  Her recent lab showed her sed rate was   normal at 1.  CRP normal at 0.3.  CBC normal.  Chemistries; normal electrolytes,   LFTs etc.  Last CCP antibody is high.  Rheumatoid factor has been negative.    X-rays in the past have shown no damage, other than some minimal degenerative   changes.        IMPRESSION:    1.  Recurring swelling, hands, left greater than right, morning time, and not   associated with any signs of synovitis.  This appears to be some  neurovascular   type of edema.  I am not sure if this could be hormonal.  She is menopausal.  I   am not sure whether some of the supplements she is taking could in some way   retain fluid or cause this type of reaction, vasogenic-type edema.  I do not   think this represents an inflammatory arthropathy with transient swelling in her   hands.    2.  Seropositive rheumatoid arthritis by history, on Orencia - no signs of   progressive damage, good control.  3.  I do not think Orencia is likely the cause of fluid retention in her hands.   4.  Immunocompromised - vaccinations up-to-date.  5.  Medication monitoring - no signs of toxicity from Orencia.      RECOMMENDATIONS:  1.  I would like her to stay off of Mobic as it did not work and could retain   fluid.  2.  She just started hormone replacement therapy and I think  That could makes a   difference.  3.  I would add B6 50 mg at night.  4.  Continue her compression gloves at night.  5.  I will add milk thistle twice daily to detoxify.    6.  I am looking all other supplements she is taking to see if there are any   reports of edema, swelling being probably a side effect.  7.  I would keep her Orencia at 125 every 7-10 days presently.    8.  She may do some hand exercises in warm water in the morning when she first   gets up, do a little massage.  Otherwise, back in 3 months.  Regular labs.               /ls [id number] ness(s)        VIRGINIA/ADEEL  dd: 06/25/2018 13:00:06 (CDT)  td: 06/26/2018 09:33:15 (CDT)  Doc ID   #[id number]  Job ID #[id number]    CC:       Prolonged visit: Over35 min spent in patient care and evaluation. Over 20 min hever used in reviewing recent labs and symptoms, discussing diagnostic possibilities for swelling in hands, counseling on  medication options, explaining  Physiology of non inflammatory swelling, and coordination of care with PT-compression gloves -do not think Neurology consult needed   Patient's questions were all addressed and she understands our  plans and risks.

## 2018-06-26 ENCOUNTER — PATIENT MESSAGE (OUTPATIENT)
Dept: RHEUMATOLOGY | Facility: CLINIC | Age: 48
End: 2018-06-26

## 2018-07-15 DIAGNOSIS — M05.79 RHEUMATOID ARTHRITIS INVOLVING MULTIPLE SITES WITH POSITIVE RHEUMATOID FACTOR: Chronic | ICD-10-CM

## 2018-07-16 RX ORDER — ABATACEPT 125 MG/ML
INJECTION, SOLUTION SUBCUTANEOUS
Qty: 4 ML | Refills: 1 | Status: SHIPPED | OUTPATIENT
Start: 2018-07-16 | End: 2018-09-04 | Stop reason: SDUPTHER

## 2018-08-07 ENCOUNTER — LAB VISIT (OUTPATIENT)
Dept: LAB | Facility: HOSPITAL | Age: 48
End: 2018-08-07
Attending: PHYSICIAN ASSISTANT
Payer: COMMERCIAL

## 2018-08-07 DIAGNOSIS — M05.79 RHEUMATOID ARTHRITIS INVOLVING MULTIPLE SITES WITH POSITIVE RHEUMATOID FACTOR: Chronic | ICD-10-CM

## 2018-08-07 DIAGNOSIS — Z51.81 MEDICATION MONITORING ENCOUNTER: Chronic | ICD-10-CM

## 2018-08-07 LAB
ALBUMIN SERPL BCP-MCNC: 4.3 G/DL
ALP SERPL-CCNC: 65 U/L
ALT SERPL W/O P-5'-P-CCNC: 22 U/L
ANION GAP SERPL CALC-SCNC: 6 MMOL/L
AST SERPL-CCNC: 24 U/L
BASOPHILS # BLD AUTO: 0.05 K/UL
BASOPHILS NFR BLD: 1.1 %
BILIRUB SERPL-MCNC: 0.6 MG/DL
BUN SERPL-MCNC: 25 MG/DL
CALCIUM SERPL-MCNC: 9.8 MG/DL
CHLORIDE SERPL-SCNC: 105 MMOL/L
CO2 SERPL-SCNC: 28 MMOL/L
CREAT SERPL-MCNC: 1 MG/DL
CRP SERPL-MCNC: 0.2 MG/L
DIFFERENTIAL METHOD: ABNORMAL
EOSINOPHIL # BLD AUTO: 0.3 K/UL
EOSINOPHIL NFR BLD: 6.5 %
ERYTHROCYTE [DISTWIDTH] IN BLOOD BY AUTOMATED COUNT: 12.4 %
ERYTHROCYTE [SEDIMENTATION RATE] IN BLOOD BY WESTERGREN METHOD: 1 MM/HR
EST. GFR  (AFRICAN AMERICAN): >60 ML/MIN/1.73 M^2
EST. GFR  (NON AFRICAN AMERICAN): >60 ML/MIN/1.73 M^2
GLUCOSE SERPL-MCNC: 101 MG/DL
HCT VFR BLD AUTO: 41.2 %
HGB BLD-MCNC: 14.1 G/DL
LYMPHOCYTES # BLD AUTO: 1.9 K/UL
LYMPHOCYTES NFR BLD: 41.4 %
MCH RBC QN AUTO: 31.8 PG
MCHC RBC AUTO-ENTMCNC: 34.2 G/DL
MCV RBC AUTO: 93 FL
MONOCYTES # BLD AUTO: 0.3 K/UL
MONOCYTES NFR BLD: 5.9 %
NEUTROPHILS # BLD AUTO: 2.1 K/UL
NEUTROPHILS NFR BLD: 45.1 %
PLATELET # BLD AUTO: 231 K/UL
PMV BLD AUTO: 10 FL
POTASSIUM SERPL-SCNC: 4.1 MMOL/L
PROT SERPL-MCNC: 6.7 G/DL
RBC # BLD AUTO: 4.43 M/UL
SODIUM SERPL-SCNC: 139 MMOL/L
WBC # BLD AUTO: 4.61 K/UL

## 2018-08-07 PROCEDURE — 85025 COMPLETE CBC W/AUTO DIFF WBC: CPT | Mod: PO

## 2018-08-07 PROCEDURE — 36415 COLL VENOUS BLD VENIPUNCTURE: CPT | Mod: PO

## 2018-08-07 PROCEDURE — 85651 RBC SED RATE NONAUTOMATED: CPT | Mod: PO

## 2018-08-07 PROCEDURE — 80053 COMPREHEN METABOLIC PANEL: CPT | Mod: PO

## 2018-08-07 PROCEDURE — 86140 C-REACTIVE PROTEIN: CPT

## 2018-08-08 ENCOUNTER — OFFICE VISIT (OUTPATIENT)
Dept: RHEUMATOLOGY | Facility: CLINIC | Age: 48
End: 2018-08-08
Payer: COMMERCIAL

## 2018-08-08 VITALS
HEART RATE: 63 BPM | BODY MASS INDEX: 22.11 KG/M2 | HEIGHT: 67 IN | SYSTOLIC BLOOD PRESSURE: 104 MMHG | WEIGHT: 140.88 LBS | DIASTOLIC BLOOD PRESSURE: 70 MMHG

## 2018-08-08 DIAGNOSIS — M05.79 RHEUMATOID ARTHRITIS INVOLVING MULTIPLE SITES WITH POSITIVE RHEUMATOID FACTOR: Primary | Chronic | ICD-10-CM

## 2018-08-08 DIAGNOSIS — D84.9 IMMUNOCOMPROMISED: ICD-10-CM

## 2018-08-08 DIAGNOSIS — Z51.81 MEDICATION MONITORING ENCOUNTER: Chronic | ICD-10-CM

## 2018-08-08 PROCEDURE — 99999 PR PBB SHADOW E&M-EST. PATIENT-LVL III: CPT | Mod: PBBFAC,,, | Performed by: PHYSICIAN ASSISTANT

## 2018-08-08 PROCEDURE — 3008F BODY MASS INDEX DOCD: CPT | Mod: CPTII,S$GLB,, | Performed by: PHYSICIAN ASSISTANT

## 2018-08-08 PROCEDURE — 99214 OFFICE O/P EST MOD 30 MIN: CPT | Mod: S$GLB,,, | Performed by: PHYSICIAN ASSISTANT

## 2018-08-08 ASSESSMENT — CLINICAL DISEASE ACTIVITY INDEX (CDAI)
PATIENT_ASSESSMENT: 0
TENDER_JOINTS_COUNT: 0
SWOLLEN_JOINTS_COUNT: 0
PHYSICIAN_ASSESSMENT: 0
TOTAL_SCORE: 0

## 2018-08-08 ASSESSMENT — ROUTINE ASSESSMENT OF PATIENT INDEX DATA (RAPID3): MDHAQ FUNCTION SCORE: 0

## 2018-08-08 NOTE — PROGRESS NOTES
"Subjective:       Patient ID: Elizabeth Johns is a 48 y.o. female.    Chief Complaint: Rheumatoid Arthritis    Elizabeth is here today Rheum follow up.  She has seropositive (+ccp) nonerosive rheumatoid arthritis. She has been on orencia for several years. Her Orencia injection was out to every 28 days. Most recently issues seem to be more carpal tunnel or cervical radicular. Numbness and tingling in her hands mostly in morning. Goes away within minutes of waking. Now in PT for chronic neck spasm, doing massage and dry needling. On B 6 100 Q day. Using otc nsaids prn not daily. Tried otc motrin, naproxen with no help. Celebrex caused weight gain.     Failed mtx monotherapy in the past. Off mtx due to side effects (fatigue and nausea). She is not on prednisone. In the past failed humira, enbrel and xeljanz these just did not help  Uses pennsaid topical bid prn. High titers CCP positive but sedimentation rate and CRP have been normal.  Rheumatoid factor negative.             Review of Systems   Constitutional: Negative for chills, fatigue and fever.   HENT: Negative for mouth sores, rhinorrhea and sore throat.    Eyes: Negative for pain and redness.   Respiratory: Negative for cough and shortness of breath.    Cardiovascular: Negative for chest pain.   Gastrointestinal: Negative for abdominal pain, constipation, diarrhea, nausea and vomiting.   Genitourinary: Negative for dysuria and hematuria.   Musculoskeletal: Negative for arthralgias, joint swelling and myalgias.   Skin: Positive for color change. Negative for rash.   Neurological: Positive for numbness (left 4th toe). Negative for weakness and headaches.   Psychiatric/Behavioral: The patient is not nervous/anxious.          Objective:   /70   Pulse 63   Ht 5' 7" (1.702 m)   Wt 63.9 kg (140 lb 14 oz)   BMI 22.06 kg/m²      Physical Exam   Constitutional: She is oriented to person, place, and time and well-developed, well-nourished, and in no distress. "   HENT:   Head: Normocephalic and atraumatic.   Eyes: Pupils are equal, round, and reactive to light. Right eye exhibits no discharge.   Neck: Normal range of motion.   Cardiovascular: Normal rate, regular rhythm and normal heart sounds.  Exam reveals no friction rub.    Pulmonary/Chest: Effort normal and breath sounds normal. No respiratory distress.   Abdominal: Soft. She exhibits no distension. There is no tenderness.       Right Side Rheumatological Exam     Examination finds the shoulder, elbow, wrist, knee, 1st PIP, 1st MCP, 2nd PIP, 2nd MCP, 3rd PIP, 3rd MCP, 4th PIP, 4th MCP, 5th PIP and 5th MCP normal.    Left Side Rheumatological Exam     Examination finds the shoulder, elbow, wrist, knee, 1st PIP, 1st MCP, 2nd PIP, 2nd MCP, 3rd PIP, 3rd MCP, 4th PIP, 4th MCP, 5th PIP and 5th MCP normal.      Lymphadenopathy:     She has no cervical adenopathy.   Neurological: She is alert and oriented to person, place, and time.   Skin: Skin is warm. No rash noted. No erythema.     Fingertips are warm   Psychiatric: Mood normal.   Musculoskeletal: Normal range of motion. She exhibits no edema, tenderness or deformity.   Right wrist swelling on the volar radial aspect with tenderness  Right hand generalized swelling to her fingers gabino 2,3 without tenderness    Left hand normal  meenakshi elbows shoulders no swelling or tenderness,full rom  meenakshi knees no effusion, no warmth, no tenderness, full rom    Left foot 4th toe appears normal, no tenderness to palpation at the mtp or ip joint           Recent Results (from the past 168 hour(s))   Sedimentation rate, manual    Collection Time: 08/07/18 11:21 AM   Result Value Ref Range    Sed Rate 1 0 - 20 mm/Hr   C-reactive protein    Collection Time: 08/07/18 11:21 AM   Result Value Ref Range    CRP 0.2 0.0 - 8.2 mg/L   Comprehensive metabolic panel    Collection Time: 08/07/18 11:21 AM   Result Value Ref Range    Sodium 139 136 - 145 mmol/L    Potassium 4.1 3.5 - 5.1 mmol/L    Chloride  105 95 - 110 mmol/L    CO2 28 23 - 29 mmol/L    Glucose 101 70 - 110 mg/dL    BUN, Bld 25 (H) 6 - 20 mg/dL    Creatinine 1.0 0.5 - 1.4 mg/dL    Calcium 9.8 8.7 - 10.5 mg/dL    Total Protein 6.7 6.0 - 8.4 g/dL    Albumin 4.3 3.5 - 5.2 g/dL    Total Bilirubin 0.6 0.1 - 1.0 mg/dL    Alkaline Phosphatase 65 55 - 135 U/L    AST 24 10 - 40 U/L    ALT 22 10 - 44 U/L    Anion Gap 6 (L) 8 - 16 mmol/L    eGFR if African American >60 >60 mL/min/1.73 m^2    eGFR if non African American >60 >60 mL/min/1.73 m^2   CBC auto differential    Collection Time: 08/07/18 11:21 AM   Result Value Ref Range    WBC 4.61 3.90 - 12.70 K/uL    RBC 4.43 4.00 - 5.40 M/uL    Hemoglobin 14.1 12.0 - 16.0 g/dL    Hematocrit 41.2 37.0 - 48.5 %    MCV 93 82 - 98 fL    MCH 31.8 (H) 27.0 - 31.0 pg    MCHC 34.2 32.0 - 36.0 g/dL    RDW 12.4 11.5 - 14.5 %    Platelets 231 150 - 350 K/uL    MPV 10.0 9.2 - 12.9 fL    Gran # (ANC) 2.1 1.8 - 7.7 K/uL    Lymph # 1.9 1.0 - 4.8 K/uL    Mono # 0.3 0.3 - 1.0 K/uL    Eos # 0.3 0.0 - 0.5 K/uL    Baso # 0.05 0.00 - 0.20 K/uL    Gran% 45.1 38.0 - 73.0 %    Lymph% 41.4 18.0 - 48.0 %    Mono% 5.9 4.0 - 15.0 %    Eosinophil% 6.5 0.0 - 8.0 %    Basophil% 1.1 0.0 - 1.9 %    Differential Method Automated          Assessment:       1. Rheumatoid arthritis involving multiple sites with positive rheumatoid factor    2. Medication monitoring encounter    3. Immunocompromised        Impression:  Seropositive rheumatoid arthritis -well controlled   currently on orencia 125mg SQ every 28 days   CDAI 0, BHASKAR 0    oral methotrexate because of side effects  In the past failed methotrexate monotherapy, failed Humira, Enbrel and Xeljanz    Immunocompromised:  Vaccines up-to-date      Medication monitoring with no toxicity side effects, chronic immunosuppression with no recurrent infections      CTS meenakshi left worse than right     Plan:       Prn use of  meloxicam 7.5 mg at night with food  Orencia Q 28 days, See how she does    B6 100 mg  bid  Wrist splints for CTS night time and driving   If not better with PT and splinting then will set up EMG/NCS       Topical Voltaren gel  4 g qid prn  to both knees can use on hands/wrists also  If not helpful will go back to Pennsaid topical but insurance did not cover     rtc 4 mon sukhi with reg 4 labs

## 2018-09-04 DIAGNOSIS — M05.79 RHEUMATOID ARTHRITIS INVOLVING MULTIPLE SITES WITH POSITIVE RHEUMATOID FACTOR: Chronic | ICD-10-CM

## 2018-09-04 RX ORDER — ABATACEPT 125 MG/ML
INJECTION, SOLUTION SUBCUTANEOUS
Qty: 4 ML | Refills: 5 | Status: SHIPPED | OUTPATIENT
Start: 2018-09-04 | End: 2019-04-16 | Stop reason: SDUPTHER

## 2018-09-18 ENCOUNTER — PATIENT MESSAGE (OUTPATIENT)
Dept: RHEUMATOLOGY | Facility: CLINIC | Age: 48
End: 2018-09-18

## 2018-09-20 ENCOUNTER — LAB VISIT (OUTPATIENT)
Dept: LAB | Facility: HOSPITAL | Age: 48
End: 2018-09-20
Attending: PHYSICIAN ASSISTANT
Payer: COMMERCIAL

## 2018-09-20 DIAGNOSIS — M05.79 RHEUMATOID ARTHRITIS INVOLVING MULTIPLE SITES WITH POSITIVE RHEUMATOID FACTOR: Chronic | ICD-10-CM

## 2018-09-20 DIAGNOSIS — Z51.81 MEDICATION MONITORING ENCOUNTER: Chronic | ICD-10-CM

## 2018-09-20 LAB
ALBUMIN SERPL BCP-MCNC: 4.2 G/DL
ALP SERPL-CCNC: 72 U/L
ALT SERPL W/O P-5'-P-CCNC: 20 U/L
ANION GAP SERPL CALC-SCNC: 8 MMOL/L
AST SERPL-CCNC: 25 U/L
BASOPHILS # BLD AUTO: 0.05 K/UL
BASOPHILS NFR BLD: 1 %
BILIRUB SERPL-MCNC: 0.4 MG/DL
BUN SERPL-MCNC: 25 MG/DL
CALCIUM SERPL-MCNC: 9.4 MG/DL
CHLORIDE SERPL-SCNC: 106 MMOL/L
CO2 SERPL-SCNC: 27 MMOL/L
CREAT SERPL-MCNC: 0.9 MG/DL
CRP SERPL-MCNC: 0.5 MG/L
DIFFERENTIAL METHOD: ABNORMAL
EOSINOPHIL # BLD AUTO: 0.3 K/UL
EOSINOPHIL NFR BLD: 7 %
ERYTHROCYTE [DISTWIDTH] IN BLOOD BY AUTOMATED COUNT: 12.7 %
ERYTHROCYTE [SEDIMENTATION RATE] IN BLOOD BY WESTERGREN METHOD: 2 MM/HR
EST. GFR  (AFRICAN AMERICAN): >60 ML/MIN/1.73 M^2
EST. GFR  (NON AFRICAN AMERICAN): >60 ML/MIN/1.73 M^2
GLUCOSE SERPL-MCNC: 81 MG/DL
HCT VFR BLD AUTO: 40.8 %
HGB BLD-MCNC: 13.7 G/DL
LYMPHOCYTES # BLD AUTO: 1.9 K/UL
LYMPHOCYTES NFR BLD: 40.1 %
MCH RBC QN AUTO: 31.3 PG
MCHC RBC AUTO-ENTMCNC: 33.6 G/DL
MCV RBC AUTO: 93 FL
MONOCYTES # BLD AUTO: 0.2 K/UL
MONOCYTES NFR BLD: 4.8 %
NEUTROPHILS # BLD AUTO: 2.3 K/UL
NEUTROPHILS NFR BLD: 47.1 %
PLATELET # BLD AUTO: 240 K/UL
PMV BLD AUTO: 9.8 FL
POTASSIUM SERPL-SCNC: 4 MMOL/L
PROT SERPL-MCNC: 6.6 G/DL
RBC # BLD AUTO: 4.38 M/UL
SODIUM SERPL-SCNC: 141 MMOL/L
WBC # BLD AUTO: 4.84 K/UL

## 2018-09-20 PROCEDURE — 86140 C-REACTIVE PROTEIN: CPT

## 2018-09-20 PROCEDURE — 85651 RBC SED RATE NONAUTOMATED: CPT | Mod: PO

## 2018-09-20 PROCEDURE — 80053 COMPREHEN METABOLIC PANEL: CPT | Mod: PO

## 2018-09-20 PROCEDURE — 36415 COLL VENOUS BLD VENIPUNCTURE: CPT | Mod: PO

## 2018-09-20 PROCEDURE — 85025 COMPLETE CBC W/AUTO DIFF WBC: CPT | Mod: PO

## 2018-09-24 ENCOUNTER — OFFICE VISIT (OUTPATIENT)
Dept: RHEUMATOLOGY | Facility: CLINIC | Age: 48
End: 2018-09-24
Payer: COMMERCIAL

## 2018-09-24 VITALS
SYSTOLIC BLOOD PRESSURE: 90 MMHG | HEIGHT: 67 IN | DIASTOLIC BLOOD PRESSURE: 58 MMHG | HEART RATE: 68 BPM | BODY MASS INDEX: 21.31 KG/M2 | WEIGHT: 135.81 LBS

## 2018-09-24 DIAGNOSIS — Z51.81 MEDICATION MONITORING ENCOUNTER: Chronic | ICD-10-CM

## 2018-09-24 DIAGNOSIS — M05.79 RHEUMATOID ARTHRITIS INVOLVING MULTIPLE SITES WITH POSITIVE RHEUMATOID FACTOR: Primary | Chronic | ICD-10-CM

## 2018-09-24 DIAGNOSIS — D84.9 IMMUNOCOMPROMISED: ICD-10-CM

## 2018-09-24 DIAGNOSIS — M05.742 RHEUMATOID ARTHRITIS INVOLVING LEFT HAND WITH POSITIVE RHEUMATOID FACTOR: ICD-10-CM

## 2018-09-24 PROCEDURE — 3008F BODY MASS INDEX DOCD: CPT | Mod: CPTII,S$GLB,, | Performed by: PHYSICIAN ASSISTANT

## 2018-09-24 PROCEDURE — 99214 OFFICE O/P EST MOD 30 MIN: CPT | Mod: S$GLB,,, | Performed by: PHYSICIAN ASSISTANT

## 2018-09-24 PROCEDURE — 99999 PR PBB SHADOW E&M-EST. PATIENT-LVL III: CPT | Mod: PBBFAC,,, | Performed by: PHYSICIAN ASSISTANT

## 2018-09-24 NOTE — PROGRESS NOTES
Subjective:       Patient ID: Elizabeth Johns is a 48 y.o. female.    Chief Complaint: Follow-up    Elizabeth is here today Rheum follow up.  She has seropositive (+ccp) nonerosive rheumatoid arthritis. She has been on orencia for several years. Her Orencia injection was out to every 21 days.    Most recently issues seem to be more carpal tunnel vs cervical radicular. Numbness and tingling in her hands mostly in morning but also c/o numbness and tingling if she raises her arms up or sits with her arms outstretched like getting manicure/driving. Had nerve conduction done no CTS on left, minimal on Right. Not sure if tested for cervical radiculopathy.  We do not have a copy.   Saw dr moya at [facility]. Had C-spine x-ray showed mild djd. Pt/dry needling has helped her neck pain/muscle spasm. On B 6 100 Q day. Using otc nsaids prn not daily. Tried otc motrin, naproxen with no help. Celebrex caused weight gain.        She also c/o am pain/swelling and stiffness left 2nd mcp joint and finger. In the morning she wakes up and the right hand and other joint stiffness get better w/in 1-3 min but the left 2nd mcp joint is swollen and has stiffness for 15- 30 min. It hurts now and then throughout the day with activity.    Pain 1-2/10. Left 2nd mcp all other joints are doing well with no issues     Failed mtx monotherapy in the past. Off mtx due to side effects (fatigue and nausea). She is not on prednisone. In the past failed humira, enbrel and xeljanz these just did not help  Uses pennsaid topical bid prn. High titers CCP positive but sedimentation rate and CRP have been normal.  Rheumatoid factor negative.             Review of Systems   Constitutional: Negative for chills, fatigue and fever.   HENT: Negative for mouth sores, rhinorrhea and sore throat.    Eyes: Negative for pain and redness.   Respiratory: Negative for cough and shortness of breath.    Cardiovascular: Negative for chest pain.   Gastrointestinal: Negative for  "abdominal pain, constipation, diarrhea, nausea and vomiting.   Genitourinary: Negative for dysuria and hematuria.   Musculoskeletal: Positive for arthralgias and joint swelling. Negative for myalgias.   Skin: Positive for color change. Negative for rash.   Neurological: Positive for numbness (left 4th toe). Negative for weakness and headaches.   Psychiatric/Behavioral: The patient is not nervous/anxious.          Objective:   BP (!) 90/58 (BP Location: Right arm, Patient Position: Sitting)   Pulse 68   Ht 5' 7" (1.702 m)   Wt 61.6 kg (135 lb 12.9 oz)   BMI 21.27 kg/m²      Physical Exam   Constitutional: She is oriented to person, place, and time and well-developed, well-nourished, and in no distress.   HENT:   Head: Normocephalic and atraumatic.   Eyes: Pupils are equal, round, and reactive to light. Right eye exhibits no discharge.   Neck: Normal range of motion.   Cardiovascular: Normal rate, regular rhythm and normal heart sounds.  Exam reveals no friction rub.    Pulmonary/Chest: Effort normal and breath sounds normal. No respiratory distress.   Abdominal: Soft. She exhibits no distension. There is no tenderness.       Right Side Rheumatological Exam     Examination finds the shoulder, elbow, wrist, knee, 1st PIP, 1st MCP, 2nd PIP, 2nd MCP, 3rd PIP, 3rd MCP, 4th PIP, 4th MCP, 5th PIP and 5th MCP normal.    Left Side Rheumatological Exam     Examination finds the shoulder, elbow, wrist, knee, 1st PIP, 1st MCP, 2nd PIP, 3rd PIP, 3rd MCP, 4th PIP, 4th MCP, 5th PIP and 5th MCP normal.    The patient is tender to palpation of the 2nd MCP.    She has swelling of the 2nd MCP      Lymphadenopathy:     She has no cervical adenopathy.   Neurological: She is alert and oriented to person, place, and time.   Skin: Skin is warm. No rash noted. No erythema.     Fingertips are warm   Psychiatric: Mood normal.   Musculoskeletal: Normal range of motion. She exhibits edema and tenderness. She exhibits no deformity.   Right " wrist swelling on the volar radial aspect with tenderness  Right hand generalized swelling to her fingers gabino 2,3 without tenderness    Left hand normal  meenakshi elbows shoulders no swelling or tenderness,full rom  meenakshi knees no effusion, no warmth, no tenderness, full rom    Left foot 4th toe appears normal, no tenderness to palpation at the mtp or ip joint           Recent Results (from the past 168 hour(s))   Sedimentation rate, manual    Collection Time: 09/20/18 11:30 AM   Result Value Ref Range    Sed Rate 2 0 - 20 mm/Hr   C-reactive protein    Collection Time: 09/20/18 11:30 AM   Result Value Ref Range    CRP 0.5 0.0 - 8.2 mg/L   Comprehensive metabolic panel    Collection Time: 09/20/18 11:30 AM   Result Value Ref Range    Sodium 141 136 - 145 mmol/L    Potassium 4.0 3.5 - 5.1 mmol/L    Chloride 106 95 - 110 mmol/L    CO2 27 23 - 29 mmol/L    Glucose 81 70 - 110 mg/dL    BUN, Bld 25 (H) 6 - 20 mg/dL    Creatinine 0.9 0.5 - 1.4 mg/dL    Calcium 9.4 8.7 - 10.5 mg/dL    Total Protein 6.6 6.0 - 8.4 g/dL    Albumin 4.2 3.5 - 5.2 g/dL    Total Bilirubin 0.4 0.1 - 1.0 mg/dL    Alkaline Phosphatase 72 55 - 135 U/L    AST 25 10 - 40 U/L    ALT 20 10 - 44 U/L    Anion Gap 8 8 - 16 mmol/L    eGFR if African American >60 >60 mL/min/1.73 m^2    eGFR if non African American >60 >60 mL/min/1.73 m^2   CBC auto differential    Collection Time: 09/20/18 11:30 AM   Result Value Ref Range    WBC 4.84 3.90 - 12.70 K/uL    RBC 4.38 4.00 - 5.40 M/uL    Hemoglobin 13.7 12.0 - 16.0 g/dL    Hematocrit 40.8 37.0 - 48.5 %    MCV 93 82 - 98 fL    MCH 31.3 (H) 27.0 - 31.0 pg    MCHC 33.6 32.0 - 36.0 g/dL    RDW 12.7 11.5 - 14.5 %    Platelets 240 150 - 350 K/uL    MPV 9.8 9.2 - 12.9 fL    Gran # (ANC) 2.3 1.8 - 7.7 K/uL    Lymph # 1.9 1.0 - 4.8 K/uL    Mono # 0.2 (L) 0.3 - 1.0 K/uL    Eos # 0.3 0.0 - 0.5 K/uL    Baso # 0.05 0.00 - 0.20 K/uL    Gran% 47.1 38.0 - 73.0 %    Lymph% 40.1 18.0 - 48.0 %    Mono% 4.8 4.0 - 15.0 %    Eosinophil% 7.0  0.0 - 8.0 %    Basophil% 1.0 0.0 - 1.9 %    Differential Method Automated          Assessment:       1. Rheumatoid arthritis involving multiple sites with positive rheumatoid factor    2. Medication monitoring encounter    3. Chronic pain of both shoulders    4. Immunocompromised        Impression:  Seropositive rheumatoid arthritis -well controlled   currently on orencia 125mg SQ every 21 days     1 S/T joint --CDAI 4, BHASKAR 0    oral methotrexate because of side effects  In the past failed methotrexate monotherapy, failed Humira, Enbrel and Xeljanz    Immunocompromised:  Vaccines up-to-date      Medication monitoring with no toxicity side effects, chronic immunosuppression with no recurrent infections      Numbness and tingling meenakshi hand- nerve conduction neg left for CTS, minimal CTS right - not sure if any cervial radiculopathy present- need reports  Consider Thoracic  outlet     Plan:           MRI left  hand looking for any RA activity or early erosive disease- x-ray neg  Bring her back after mri, can locally inject the left 2nd cmp joint    Get [facility] of [facility] NCS [facility]  Get back to DR Geronimo to evaluate c-spine, consider thoracici outlet also,  does not seem to be CTS related     Prn use of  meloxicam 7.5 mg at night with food  Orencia Q 21 days, may need to increase to Q 14 days and  See how she does    B6 100 mg bid      Topical Voltaren gel  4 g qid prn     See dr solis back after mri done to review findings

## 2018-09-25 ENCOUNTER — TELEPHONE (OUTPATIENT)
Dept: RADIOLOGY | Facility: HOSPITAL | Age: 48
End: 2018-09-25

## 2018-09-25 ENCOUNTER — PATIENT MESSAGE (OUTPATIENT)
Dept: RHEUMATOLOGY | Facility: CLINIC | Age: 48
End: 2018-09-25

## 2018-09-25 ENCOUNTER — LAB VISIT (OUTPATIENT)
Dept: LAB | Facility: HOSPITAL | Age: 48
End: 2018-09-25
Attending: PHYSICIAN ASSISTANT
Payer: COMMERCIAL

## 2018-09-25 DIAGNOSIS — M05.79 RHEUMATOID ARTHRITIS INVOLVING MULTIPLE SITES WITH POSITIVE RHEUMATOID FACTOR: Chronic | ICD-10-CM

## 2018-09-25 DIAGNOSIS — M05.742 RHEUMATOID ARTHRITIS INVOLVING LEFT HAND WITH POSITIVE RHEUMATOID FACTOR: ICD-10-CM

## 2018-09-25 LAB — CCP AB SER IA-ACNC: 115.2 U/ML

## 2018-09-25 PROCEDURE — 36415 COLL VENOUS BLD VENIPUNCTURE: CPT | Mod: PO

## 2018-09-25 PROCEDURE — 86200 CCP ANTIBODY: CPT

## 2018-09-26 ENCOUNTER — HOSPITAL ENCOUNTER (OUTPATIENT)
Dept: RADIOLOGY | Facility: HOSPITAL | Age: 48
Discharge: HOME | End: 2018-09-26
Attending: PHYSICIAN ASSISTANT
Payer: COMMERCIAL

## 2018-09-26 DIAGNOSIS — M05.742 RHEUMATOID ARTHRITIS INVOLVING LEFT HAND WITH POSITIVE RHEUMATOID FACTOR: ICD-10-CM

## 2018-09-26 PROCEDURE — A9585 GADOBUTROL INJECTION: HCPCS | Mod: PO | Performed by: PHYSICIAN ASSISTANT

## 2018-09-26 PROCEDURE — 73220 MRI UPPR EXTREMITY W/O&W/DYE: CPT | Mod: TC,PO,LT

## 2018-09-26 PROCEDURE — 25500020 PHARM REV CODE 255: Mod: PO | Performed by: PHYSICIAN ASSISTANT

## 2018-09-26 PROCEDURE — 73220 MRI UPPR EXTREMITY W/O&W/DYE: CPT | Mod: 26,LT,, | Performed by: RADIOLOGY

## 2018-09-26 RX ORDER — GADOBUTROL 604.72 MG/ML
6 INJECTION INTRAVENOUS
Status: COMPLETED | OUTPATIENT
Start: 2018-09-26 | End: 2018-09-26

## 2018-09-26 RX ADMIN — GADOBUTROL 6 ML: 604.72 INJECTION INTRAVENOUS at 09:09

## 2018-10-02 ENCOUNTER — OFFICE VISIT (OUTPATIENT)
Dept: RHEUMATOLOGY | Facility: CLINIC | Age: 48
End: 2018-10-02
Payer: COMMERCIAL

## 2018-10-02 VITALS
WEIGHT: 142.88 LBS | BODY MASS INDEX: 22.43 KG/M2 | HEIGHT: 67 IN | DIASTOLIC BLOOD PRESSURE: 66 MMHG | HEART RATE: 77 BPM | SYSTOLIC BLOOD PRESSURE: 103 MMHG

## 2018-10-02 DIAGNOSIS — M05.79 RHEUMATOID ARTHRITIS INVOLVING MULTIPLE SITES WITH POSITIVE RHEUMATOID FACTOR: Primary | Chronic | ICD-10-CM

## 2018-10-02 DIAGNOSIS — Z51.81 MEDICATION MONITORING ENCOUNTER: Chronic | ICD-10-CM

## 2018-10-02 DIAGNOSIS — M65.322 TRIGGER INDEX FINGER OF LEFT HAND: ICD-10-CM

## 2018-10-02 DIAGNOSIS — D84.9 IMMUNOCOMPROMISED: ICD-10-CM

## 2018-10-02 PROCEDURE — 20550 NJX 1 TENDON SHEATH/LIGAMENT: CPT | Mod: LT,S$GLB,, | Performed by: INTERNAL MEDICINE

## 2018-10-02 PROCEDURE — 99214 OFFICE O/P EST MOD 30 MIN: CPT | Mod: 25,S$GLB,, | Performed by: INTERNAL MEDICINE

## 2018-10-02 PROCEDURE — 3008F BODY MASS INDEX DOCD: CPT | Mod: CPTII,S$GLB,, | Performed by: INTERNAL MEDICINE

## 2018-10-02 PROCEDURE — 99999 PR PBB SHADOW E&M-EST. PATIENT-LVL III: CPT | Mod: PBBFAC,,, | Performed by: INTERNAL MEDICINE

## 2018-10-02 RX ADMIN — BETAMETHASONE SODIUM PHOSPHATE AND BETAMETHASONE ACETATE 1.5 MG: 3; 3 INJECTION, SUSPENSION INTRA-ARTICULAR; INTRALESIONAL; INTRAMUSCULAR; SOFT TISSUE at 01:10

## 2018-10-02 NOTE — PROCEDURES
Tendon Sheath: L index MCP  Date/Time: 10/2/2018 1:04 PM  Performed by: Osmar Childers MD  Authorized by: Osmar Childers MD     Consent Done?:  Yes (Verbal)  Timeout: prior to procedure the correct patient, procedure, and site was verified    Indications:  Pain and joint swelling  Site marked: the procedure site was marked    Timeout: prior to procedure the correct patient, procedure, and site was verified    Location:  Index finger  Site:  L index MCP  Ultrasonic guidance for needle placement?: No    Needle size:  25 G  Approach:  Volar  Medications:  1.5 mg betamethasone acetate-betamethasone sodium phosphate 6 mg/mL  Aspirate amount (ml):  0  Patient tolerance:  Patient tolerated the procedure well with no immediate complications   Procedure note: After verbal consent was obtained. The  L 2 nd flexor sheath  was prepared with sterile prep.  The skin was anesthetized with 1% ethyl chloride.   The flexor sheath was then injected with 0.25 mL celestone/soluspan 6 mg/5 mL, and 0.125 mL of 1 % lidocaine.  Hemostasis was obtained.  The patient tolerated procedure well with no complications.  Discharge and  icing instructions given to patient

## 2018-10-02 NOTE — PATIENT INSTRUCTIONS
Flu shot in Nov    Ok for orencia every 3 weeks    Ok for supplements    Ice to finger if pain after shot

## 2018-10-03 ENCOUNTER — PATIENT MESSAGE (OUTPATIENT)
Dept: RHEUMATOLOGY | Facility: CLINIC | Age: 48
End: 2018-10-03

## 2018-10-09 ENCOUNTER — TELEPHONE (OUTPATIENT)
Dept: RHEUMATOLOGY | Facility: CLINIC | Age: 48
End: 2018-10-09

## 2018-10-09 NOTE — TELEPHONE ENCOUNTER
Spoke with Mrs. Johns informed her that [facility] has been trying to contact her to set delivery up. Mrs. Johns stated that she has been out of town and she will call them now.

## 2018-10-12 RX ORDER — BETAMETHASONE SODIUM PHOSPHATE AND BETAMETHASONE ACETATE 3; 3 MG/ML; MG/ML
1.5 INJECTION, SUSPENSION INTRA-ARTICULAR; INTRALESIONAL; INTRAMUSCULAR; SOFT TISSUE
Status: DISCONTINUED | OUTPATIENT
Start: 2018-10-02 | End: 2018-10-12 | Stop reason: HOSPADM

## 2018-10-12 NOTE — PROGRESS NOTES
Subjective:       Patient ID: Elizabeth Johns is a 48 y.o. female.    Chief Complaint: Rheumatoid Arthritis    Elizabeth is here because of an acute exacerbation her left hand involving the 2nd MP joint and flexor tendon area.  She rates her pain area 3 to 4/10 and is interfering with her daily activities.  It is stiff in the morning and frequently feels like it might catch She has seropositive (+ccp) nonerosive rheumatoid arthritis. She has been on orencia for several years. Her Orencia injection was out to every 21 days.    At her last visit she was noted to have carpal tunnel vs cervical radicular pain. Numbness and tingling in her hands mostly in morning but also c/o numbness and tingling if she raises her arms up or sits with her arms outstretched like getting manicure/driving. Had nerve conduction done no CTS on left, minimal on Right. Saw dr moya at [facility]. Had C-spine x-ray showed mild djd. Pt/dry needling has helped her neck pain/muscle spasm. On B 6 100 Q day. Using otc nsaids prn not daily. Tried otc motrin, naproxen with no help. Celebrex caused weight gain.       Failed mtx monotherapy in the past. Off mtx due to side effects (fatigue and nausea). She is not on prednisone. In the past failed humira, enbrel and xeljanz these just did not help  Uses pennsaid topical bid prn. High titers CCP positive but sedimentation rate and CRP have been normal.  Rheumatoid factor negative.  She is happy with the rest of her joints on the Orencia every 21 days and does not want to increase the dose to more frequent    She has not had fever chills sweats or any signs of infections.  She has had no reactions to the Orencia.  She would like me to inject this exacerbated region if possible.   she did get an MRI of the left hand within the last week          Review of Systems   Constitutional: Negative for chills, fatigue and fever.   HENT: Negative for mouth sores, rhinorrhea and sore throat.    Eyes: Negative for pain and  "redness.   Respiratory: Negative for cough and shortness of breath.    Cardiovascular: Negative for chest pain.   Gastrointestinal: Negative for abdominal pain, constipation, diarrhea, nausea and vomiting.   Genitourinary: Negative for dysuria and hematuria.   Musculoskeletal: Positive for arthralgias and joint swelling. Negative for myalgias.        See HPI   Skin: Positive for color change. Negative for rash.   Allergic/Immunologic: Positive for immunocompromised state.   Neurological: Positive for numbness (left 4th toe). Negative for weakness and headaches.   Hematological: Negative.    Psychiatric/Behavioral: Negative for agitation and suicidal ideas. The patient is not nervous/anxious.          Objective:   /66   Pulse 77   Ht 5' 7" (1.702 m)   Wt 64.8 kg (142 lb 13.7 oz)   BMI 22.37 kg/m²      Physical Exam   Constitutional: She is oriented to person, place, and time and well-developed, well-nourished, and in no distress.   HENT:   Head: Normocephalic and atraumatic.   Eyes: Pupils are equal, round, and reactive to light. Right eye exhibits no discharge.   Neck: Normal range of motion.   Cardiovascular: Normal rate, regular rhythm and normal heart sounds.  Exam reveals no friction rub.    Pulmonary/Chest: Effort normal and breath sounds normal. No respiratory distress.   Abdominal: Soft. She exhibits no distension. There is no tenderness.       Right Side Rheumatological Exam     Examination finds the shoulder, elbow, wrist, knee, 1st PIP, 1st MCP, 2nd PIP, 2nd MCP, 3rd PIP, 3rd MCP, 4th PIP, 4th MCP, 5th PIP and 5th MCP normal.    Left Side Rheumatological Exam     Examination finds the shoulder, elbow, wrist, knee, 1st PIP, 1st MCP, 2nd PIP, 3rd PIP, 3rd MCP, 4th PIP, 4th MCP, 5th PIP and 5th MCP normal.    The patient is tender to palpation of the 2nd MCP.    She has swelling of the 2nd MCP      Lymphadenopathy:     She has no cervical adenopathy.   Neurological: She is alert and oriented to " person, place, and time.   Skin: Skin is warm. No rash noted. No erythema.     Fingertips are warm   Psychiatric: Mood normal.   Musculoskeletal: Normal range of motion. She exhibits edema and tenderness. She exhibits no deformity.   Left 2nd MP joint is minimally tender and swollen  The flexor sheath however is tender and swollen and there is mild triggering    Left wrist has normal range of motion   meenakshi elbows shoulders no swelling or tenderness,full rom  meenakshi knees no effusion, no warmth, no tenderness, full rom    Ankles have good range of motion without tenderness   no tenderness to palpation at the mtp or ip joint           MRI HAND FINGERS W WO CONTRAST LEFT    CLINICAL HISTORY:  Arthritis, hand;Joint pain, chronic, rheumatoid arthritis suspected;  Rheumatoid arthritis with rheumatoid factor of left hand without organ or systems involvement    TECHNIQUE:  Multisequence, multiplanar MR imaging of the hand/wrist performed prior to and post 6 cc gadobutrol    COMPARISON:  Radiographs December 7, 2017    FINDINGS:  No concerning bone marrow signal abnormality.  No evidence of erosions or concerning synovial enhancement.  There is mild degenerative changes at the 1st CMC joint.  Trace flexor tendon sheath fluid noted at the 2nd, 3rd and 4th PIP joint levels felt to be still within physiologic limits.  No definite flexor or extensor tenosynovitis.  Carpal tunnel is unremarkable.      Impression       No concerning erosive findings or synovial enhancement.  Mild 1st CMC joint degenerative changes.           Assessment:       1. Rheumatoid arthritis involving multiple sites with positive rheumatoid factor -acute exacerbation of her left 2nd finger-tenosynovitis-MRI with no signs of bony erosion or severe synovitis or bone marrow edema   2. Immunocompromised    3. Medication monitoring encounter -no toxicity to medication noted   4.  5 Trigger index finger of left hand   Immunocompromised-needs a flu shot in next month        Impression:      Plan:         1.  Inject the left 2nd flexor tendon sheath-see procedure note    2.  Will leave her Orencia at 125 mg every 3 weeks for the present time-if she gets more flare-ups involving her hands I would encourage her to go back to every 7-14 day injection cycle    3.  Continue her vitamins and supplements-she is using CBD oil twice day now and less advil    4.  Continue her exercise programs    5.  Recommend she get a high strength flu shot in November-would do this about longterm between her Orencia injections    6.  Routine follow-up at 4 month intervals with regular lab    Prolonged visit: Over35 min spent in patient care and evaluation. Over 20 mintime used in reviewing recent labs and symptoms, discussing diagnostic possibilities, counseling on injection and medication options, reviewing side effects of therapies, and coordination of care for vaccinations   Patient's questions were all addressed and she understands our plans and risks.

## 2018-10-15 ENCOUNTER — PATIENT MESSAGE (OUTPATIENT)
Dept: RHEUMATOLOGY | Facility: CLINIC | Age: 48
End: 2018-10-15

## 2018-11-07 ENCOUNTER — PATIENT MESSAGE (OUTPATIENT)
Dept: RHEUMATOLOGY | Facility: CLINIC | Age: 48
End: 2018-11-07

## 2018-11-19 ENCOUNTER — PATIENT MESSAGE (OUTPATIENT)
Dept: RHEUMATOLOGY | Facility: CLINIC | Age: 48
End: 2018-11-19

## 2018-11-21 ENCOUNTER — CLINICAL SUPPORT (OUTPATIENT)
Dept: RHEUMATOLOGY | Facility: CLINIC | Age: 48
End: 2018-11-21
Payer: COMMERCIAL

## 2018-11-21 DIAGNOSIS — D84.9 IMMUNOCOMPROMISED: ICD-10-CM

## 2018-11-21 PROCEDURE — 99999 PR PBB SHADOW E&M-EST. PATIENT-LVL II: CPT | Mod: PBBFAC,,,

## 2018-11-21 PROCEDURE — 90662 IIV NO PRSV INCREASED AG IM: CPT | Mod: S$GLB,,, | Performed by: PHYSICIAN ASSISTANT

## 2018-11-21 PROCEDURE — 90471 IMMUNIZATION ADMIN: CPT | Mod: S$GLB,,, | Performed by: PHYSICIAN ASSISTANT

## 2018-11-21 RX ORDER — PROGESTERONE 200 MG/1
200 CAPSULE ORAL NIGHTLY
Refills: 4 | COMMUNITY
Start: 2018-10-10 | End: 2020-12-02

## 2018-11-21 RX ORDER — CYCLOBENZAPRINE HCL 10 MG
TABLET ORAL
Refills: 0 | COMMUNITY
Start: 2018-10-30 | End: 2020-03-09

## 2018-11-21 RX ORDER — LIFITEGRAST 50 MG/ML
SOLUTION/ DROPS OPHTHALMIC DAILY
COMMUNITY
Start: 2018-11-15

## 2018-11-21 RX ORDER — ESTRADIOL 10 UG/1
INSERT VAGINAL
Refills: 5 | COMMUNITY
Start: 2018-11-11 | End: 2020-12-02

## 2018-11-21 RX ORDER — ESTRADIOL 1 MG/1
1 TABLET ORAL DAILY
Refills: 4 | COMMUNITY
Start: 2018-10-10 | End: 2020-12-02

## 2018-12-11 ENCOUNTER — LAB VISIT (OUTPATIENT)
Dept: LAB | Facility: HOSPITAL | Age: 48
End: 2018-12-11
Attending: PHYSICIAN ASSISTANT
Payer: COMMERCIAL

## 2018-12-11 DIAGNOSIS — Z51.81 MEDICATION MONITORING ENCOUNTER: Chronic | ICD-10-CM

## 2018-12-11 DIAGNOSIS — M05.79 RHEUMATOID ARTHRITIS INVOLVING MULTIPLE SITES WITH POSITIVE RHEUMATOID FACTOR: Chronic | ICD-10-CM

## 2018-12-11 LAB
ALBUMIN SERPL BCP-MCNC: 4.4 G/DL
ALP SERPL-CCNC: 84 U/L
ALT SERPL W/O P-5'-P-CCNC: 16 U/L
ANION GAP SERPL CALC-SCNC: 7 MMOL/L
AST SERPL-CCNC: 21 U/L
BASOPHILS # BLD AUTO: 0.06 K/UL
BASOPHILS NFR BLD: 1.2 %
BILIRUB SERPL-MCNC: 0.4 MG/DL
BUN SERPL-MCNC: 19 MG/DL
CALCIUM SERPL-MCNC: 10 MG/DL
CHLORIDE SERPL-SCNC: 105 MMOL/L
CO2 SERPL-SCNC: 28 MMOL/L
CREAT SERPL-MCNC: 1 MG/DL
CRP SERPL-MCNC: 0.4 MG/L
DIFFERENTIAL METHOD: ABNORMAL
EOSINOPHIL # BLD AUTO: 0.1 K/UL
EOSINOPHIL NFR BLD: 2.5 %
ERYTHROCYTE [DISTWIDTH] IN BLOOD BY AUTOMATED COUNT: 12.4 %
ERYTHROCYTE [SEDIMENTATION RATE] IN BLOOD BY WESTERGREN METHOD: 1 MM/HR
EST. GFR  (AFRICAN AMERICAN): >60 ML/MIN/1.73 M^2
EST. GFR  (NON AFRICAN AMERICAN): >60 ML/MIN/1.73 M^2
GLUCOSE SERPL-MCNC: 101 MG/DL
HCT VFR BLD AUTO: 44.2 %
HGB BLD-MCNC: 15 G/DL
LYMPHOCYTES # BLD AUTO: 1.8 K/UL
LYMPHOCYTES NFR BLD: 35.7 %
MCH RBC QN AUTO: 31.9 PG
MCHC RBC AUTO-ENTMCNC: 33.9 G/DL
MCV RBC AUTO: 94 FL
MONOCYTES # BLD AUTO: 0.3 K/UL
MONOCYTES NFR BLD: 6.3 %
NEUTROPHILS # BLD AUTO: 2.8 K/UL
NEUTROPHILS NFR BLD: 54.3 %
PLATELET # BLD AUTO: 250 K/UL
PMV BLD AUTO: 9.8 FL
POTASSIUM SERPL-SCNC: 4.4 MMOL/L
PROT SERPL-MCNC: 7.5 G/DL
RBC # BLD AUTO: 4.7 M/UL
SODIUM SERPL-SCNC: 140 MMOL/L
WBC # BLD AUTO: 5.1 K/UL

## 2018-12-11 PROCEDURE — 80053 COMPREHEN METABOLIC PANEL: CPT | Mod: PO

## 2018-12-11 PROCEDURE — 86140 C-REACTIVE PROTEIN: CPT

## 2018-12-11 PROCEDURE — 36415 COLL VENOUS BLD VENIPUNCTURE: CPT | Mod: PO

## 2018-12-11 PROCEDURE — 85025 COMPLETE CBC W/AUTO DIFF WBC: CPT | Mod: PO

## 2018-12-11 PROCEDURE — 85651 RBC SED RATE NONAUTOMATED: CPT | Mod: PO

## 2018-12-12 ENCOUNTER — OFFICE VISIT (OUTPATIENT)
Dept: RHEUMATOLOGY | Facility: CLINIC | Age: 48
End: 2018-12-12
Payer: COMMERCIAL

## 2018-12-12 VITALS
DIASTOLIC BLOOD PRESSURE: 66 MMHG | HEART RATE: 80 BPM | WEIGHT: 148.81 LBS | BODY MASS INDEX: 23.31 KG/M2 | SYSTOLIC BLOOD PRESSURE: 117 MMHG

## 2018-12-12 DIAGNOSIS — M05.79 RHEUMATOID ARTHRITIS INVOLVING MULTIPLE SITES WITH POSITIVE RHEUMATOID FACTOR: Primary | Chronic | ICD-10-CM

## 2018-12-12 DIAGNOSIS — D84.9 IMMUNOCOMPROMISED: ICD-10-CM

## 2018-12-12 DIAGNOSIS — Z51.81 MEDICATION MONITORING ENCOUNTER: Chronic | ICD-10-CM

## 2018-12-12 PROCEDURE — 99999 PR PBB SHADOW E&M-EST. PATIENT-LVL III: CPT | Mod: PBBFAC,,, | Performed by: PHYSICIAN ASSISTANT

## 2018-12-12 PROCEDURE — 99214 OFFICE O/P EST MOD 30 MIN: CPT | Mod: S$GLB,,, | Performed by: PHYSICIAN ASSISTANT

## 2018-12-12 PROCEDURE — 3008F BODY MASS INDEX DOCD: CPT | Mod: CPTII,S$GLB,, | Performed by: PHYSICIAN ASSISTANT

## 2018-12-12 NOTE — PROGRESS NOTES
Subjective:       Patient ID: Elizabeth Johns is a 48 y.o. female.    Chief Complaint: Follow-up (RA; no pain today) and Rheumatoid Arthritis    Elizabeth is here today Rheum follow up.  She has seropositive (+ccp) nonerosive rheumatoid arthritis. She has been on orencia for several years. Her Orencia injection was out to every 21 days.    Had some carpal tunnel vs cervical radicular. Numbness and tingling in her hands mostly in morning but also c/o numbness and tingling if she raises her arms up or sits with her arms outstretched like getting manicure/driving. Had nerve conduction done no CTS on left, minimal on Right. No cervical radiculopathy.copy in care everywhere. Saw dr moya at [facility]. Had C-spine x-ray showed mild djd. Pt/dry needling has helped her neck pain/muscle spasm. On B 6 100 Q day. Using otc nsaids prn not daily. Tried otc motrin, naproxen with no help. Celebrex caused weight gain. This has done well. No big issues lately.     morning stiffness to all areas about 1-2 min left 2nd finger stiffness takes about 30-45 min. It hurts now and then throughout the day with activity.  Pain 0/10.     Failed mtx monotherapy in the past. Off mtx due to side effects (fatigue and nausea). She is not on prednisone. In the past failed humira, enbrel and xeljanz these just did not help  Uses pennsaid topical bid prn. High titers CCP positive but sedimentation rate and CRP have been normal.  Rheumatoid factor negative.             Review of Systems   Constitutional: Negative for chills, fatigue and fever.   HENT: Negative for mouth sores, rhinorrhea and sore throat.    Eyes: Negative for pain and redness.   Respiratory: Negative for cough and shortness of breath.    Cardiovascular: Negative for chest pain.   Gastrointestinal: Negative for abdominal pain, constipation, diarrhea, nausea and vomiting.   Genitourinary: Negative for dysuria and hematuria.   Musculoskeletal: Positive for arthralgias. Negative for joint  swelling and myalgias.   Skin: Negative for color change and rash.   Neurological: Negative for weakness, numbness (left 4th toe) and headaches.   Psychiatric/Behavioral: The patient is not nervous/anxious.          Objective:   /66   Pulse 80   Wt 67.5 kg (148 lb 13 oz)   BMI 23.31 kg/m²      Physical Exam   Constitutional: She is oriented to person, place, and time and well-developed, well-nourished, and in no distress.   HENT:   Head: Normocephalic and atraumatic.   Eyes: Pupils are equal, round, and reactive to light. Right eye exhibits no discharge.   Neck: Normal range of motion.   Cardiovascular: Normal rate, regular rhythm and normal heart sounds.  Exam reveals no friction rub.    Pulmonary/Chest: Effort normal and breath sounds normal. No respiratory distress.   Abdominal: Soft. She exhibits no distension. There is no tenderness.       Right Side Rheumatological Exam     Examination finds the shoulder, elbow, wrist, knee, 1st PIP, 1st MCP, 2nd PIP, 2nd MCP, 3rd PIP, 3rd MCP, 4th PIP, 4th MCP, 5th PIP and 5th MCP normal.    Left Side Rheumatological Exam     Examination finds the shoulder, elbow, wrist, knee, 1st PIP, 1st MCP, 2nd PIP, 2nd MCP, 3rd PIP, 3rd MCP, 4th PIP, 4th MCP, 5th PIP and 5th MCP normal.      Lymphadenopathy:     She has no cervical adenopathy.   Neurological: She is alert and oriented to person, place, and time.   Skin: Skin is warm. No rash noted. No erythema.     Fingertips are warm   Psychiatric: Mood normal.   Musculoskeletal: Normal range of motion. She exhibits no edema, tenderness or deformity.           Recent Results (from the past 168 hour(s))   Sedimentation rate, manual    Collection Time: 12/11/18 12:35 PM   Result Value Ref Range    Sed Rate 1 0 - 20 mm/Hr   C-reactive protein    Collection Time: 12/11/18 12:35 PM   Result Value Ref Range    CRP 0.4 0.0 - 8.2 mg/L   Comprehensive metabolic panel    Collection Time: 12/11/18 12:35 PM   Result Value Ref Range     Sodium 140 136 - 145 mmol/L    Potassium 4.4 3.5 - 5.1 mmol/L    Chloride 105 95 - 110 mmol/L    CO2 28 23 - 29 mmol/L    Glucose 101 70 - 110 mg/dL    BUN, Bld 19 6 - 20 mg/dL    Creatinine 1.0 0.5 - 1.4 mg/dL    Calcium 10.0 8.7 - 10.5 mg/dL    Total Protein 7.5 6.0 - 8.4 g/dL    Albumin 4.4 3.5 - 5.2 g/dL    Total Bilirubin 0.4 0.1 - 1.0 mg/dL    Alkaline Phosphatase 84 55 - 135 U/L    AST 21 10 - 40 U/L    ALT 16 10 - 44 U/L    Anion Gap 7 (L) 8 - 16 mmol/L    eGFR if African American >60 >60 mL/min/1.73 m^2    eGFR if non African American >60 >60 mL/min/1.73 m^2   CBC auto differential    Collection Time: 12/11/18 12:35 PM   Result Value Ref Range    WBC 5.10 3.90 - 12.70 K/uL    RBC 4.70 4.00 - 5.40 M/uL    Hemoglobin 15.0 12.0 - 16.0 g/dL    Hematocrit 44.2 37.0 - 48.5 %    MCV 94 82 - 98 fL    MCH 31.9 (H) 27.0 - 31.0 pg    MCHC 33.9 32.0 - 36.0 g/dL    RDW 12.4 11.5 - 14.5 %    Platelets 250 150 - 350 K/uL    MPV 9.8 9.2 - 12.9 fL    Gran # (ANC) 2.8 1.8 - 7.7 K/uL    Lymph # 1.8 1.0 - 4.8 K/uL    Mono # 0.3 0.3 - 1.0 K/uL    Eos # 0.1 0.0 - 0.5 K/uL    Baso # 0.06 0.00 - 0.20 K/uL    Gran% 54.3 38.0 - 73.0 %    Lymph% 35.7 18.0 - 48.0 %    Mono% 6.3 4.0 - 15.0 %    Eosinophil% 2.5 0.0 - 8.0 %    Basophil% 1.2 0.0 - 1.9 %    Differential Method Automated          Immunization History   Administered Date(s) Administered    DTaP 06/26/2009    Influenza - High Dose 11/09/2016, 11/14/2017, 11/21/2018    Influenza - Quadrivalent 11/05/2014, 10/19/2015    Influenza Split 10/10/2013    Pneumococcal Conjugate - 13 Valent 12/19/2014    Pneumococcal Polysaccharide - 23 Valent 12/02/2015    Tdap 07/21/2016    Zoster 10/10/2013         Assessment:       1. Rheumatoid arthritis involving multiple sites with positive rheumatoid factor    2. Immunocompromised    3. Medication monitoring encounter        Impression:  Seropositive rheumatoid arthritis -well controlled   currently on orencia 125mg SQ every 21 days      0 S/T joint --CDAI 0, BHASKAR 0    oral methotrexate because of side effects  In the past failed methotrexate monotherapy, failed Humira, Enbrel and Xeljanz    Immunocompromised:  Vaccines up-to-date      Medication monitoring with no toxicity side effects, chronic immunosuppression with no recurrent infections      Numbness and tingling meenakshi hand- nerve conduction neg left for CTS, minimal CTS right - no  cervial radiculopathy present-- will follow       Plan:           Bracing and B6 100 mg bid as needed       Prn use of  meloxicam 7.5 mg at night with food  Orencia Q 21 days        Topical Voltaren gel  4 g qid prn     See dr solis back after mri done to review findings

## 2018-12-22 ENCOUNTER — PATIENT MESSAGE (OUTPATIENT)
Dept: RHEUMATOLOGY | Facility: CLINIC | Age: 48
End: 2018-12-22

## 2018-12-23 ENCOUNTER — NURSE TRIAGE (OUTPATIENT)
Dept: ADMINISTRATIVE | Facility: CLINIC | Age: 48
End: 2018-12-23

## 2018-12-23 ENCOUNTER — PATIENT MESSAGE (OUTPATIENT)
Dept: RHEUMATOLOGY | Facility: CLINIC | Age: 48
End: 2018-12-23

## 2018-12-24 ENCOUNTER — PROCEDURE VISIT (OUTPATIENT)
Dept: RHEUMATOLOGY | Facility: CLINIC | Age: 48
End: 2018-12-24
Payer: COMMERCIAL

## 2018-12-24 DIAGNOSIS — M25.511 ACUTE PAIN OF RIGHT SHOULDER: Primary | ICD-10-CM

## 2018-12-24 PROCEDURE — 20610 DRAIN/INJ JOINT/BURSA W/O US: CPT | Mod: RT,S$GLB,, | Performed by: PHYSICIAN ASSISTANT

## 2018-12-24 RX ORDER — TRIAMCINOLONE ACETONIDE 40 MG/ML
40 INJECTION, SUSPENSION INTRA-ARTICULAR; INTRAMUSCULAR
Status: DISCONTINUED | OUTPATIENT
Start: 2018-12-24 | End: 2018-12-24 | Stop reason: HOSPADM

## 2018-12-24 RX ADMIN — TRIAMCINOLONE ACETONIDE 40 MG: 40 INJECTION, SUSPENSION INTRA-ARTICULAR; INTRAMUSCULAR at 10:12

## 2018-12-24 NOTE — PROCEDURES
Large Joint Aspiration/Injection: R subacromial bursa  Date/Time: 12/24/2018 10:40 AM  Performed by: Ramila Lund PA-C  Authorized by: Ramila Lund PA-C     Indications:  Pain  Procedure site marked: Yes      Location:  Shoulder  Site:  R subacromial bursa  Medications:  40 mg triamcinolone acetonide 40 mg/mL  Patient tolerance:  Patient tolerated the procedure well with no immediate complications    Additional Comments: Procedure note: After verbal consent was obtained.  The right shoulder was prepared with sterile prep.  The skin was anesthetized with 1% ethyl chloride.  The shoulder joint was injected with 2.5 cc of 1% lidocaine to anesthetize the joint.  The shoulder joint was then injected with 40mg kenalog  and 1.0 mL of 1% lidocaine.  Hemostasis was obtained.  The patient tolerated procedure well with no complications.  Patient discharged with icing instructions

## 2019-01-04 ENCOUNTER — TELEPHONE (OUTPATIENT)
Dept: RHEUMATOLOGY | Facility: CLINIC | Age: 49
End: 2019-01-04

## 2019-02-04 ENCOUNTER — PATIENT MESSAGE (OUTPATIENT)
Dept: RHEUMATOLOGY | Facility: CLINIC | Age: 49
End: 2019-02-04

## 2019-04-16 ENCOUNTER — PATIENT MESSAGE (OUTPATIENT)
Dept: RHEUMATOLOGY | Facility: CLINIC | Age: 49
End: 2019-04-16

## 2019-04-16 ENCOUNTER — LAB VISIT (OUTPATIENT)
Dept: LAB | Facility: HOSPITAL | Age: 49
End: 2019-04-16
Payer: COMMERCIAL

## 2019-04-16 DIAGNOSIS — M05.79 RHEUMATOID ARTHRITIS INVOLVING MULTIPLE SITES WITH POSITIVE RHEUMATOID FACTOR: Chronic | ICD-10-CM

## 2019-04-16 DIAGNOSIS — Z11.59 NEED FOR HEPATITIS B SCREENING TEST: Primary | ICD-10-CM

## 2019-04-16 DIAGNOSIS — D84.9 IMMUNOCOMPROMISED: ICD-10-CM

## 2019-04-16 DIAGNOSIS — Z51.81 MEDICATION MONITORING ENCOUNTER: Chronic | ICD-10-CM

## 2019-04-16 DIAGNOSIS — Z11.59 NEED FOR HEPATITIS B SCREENING TEST: ICD-10-CM

## 2019-04-16 LAB
ALBUMIN SERPL BCP-MCNC: 3.8 G/DL (ref 3.5–5.2)
ALP SERPL-CCNC: 78 U/L (ref 55–135)
ALT SERPL W/O P-5'-P-CCNC: 27 U/L (ref 10–44)
ANION GAP SERPL CALC-SCNC: 8 MMOL/L (ref 8–16)
AST SERPL-CCNC: 25 U/L (ref 10–40)
BASOPHILS # BLD AUTO: 0.06 K/UL (ref 0–0.2)
BASOPHILS NFR BLD: 0.9 % (ref 0–1.9)
BILIRUB SERPL-MCNC: 0.5 MG/DL (ref 0.1–1)
BUN SERPL-MCNC: 20 MG/DL (ref 6–20)
CALCIUM SERPL-MCNC: 9.5 MG/DL (ref 8.7–10.5)
CHLORIDE SERPL-SCNC: 107 MMOL/L (ref 95–110)
CO2 SERPL-SCNC: 26 MMOL/L (ref 23–29)
CREAT SERPL-MCNC: 0.9 MG/DL (ref 0.5–1.4)
CRP SERPL-MCNC: 1 MG/L (ref 0–8.2)
DIFFERENTIAL METHOD: ABNORMAL
EOSINOPHIL # BLD AUTO: 0.8 K/UL (ref 0–0.5)
EOSINOPHIL NFR BLD: 13.2 % (ref 0–8)
ERYTHROCYTE [DISTWIDTH] IN BLOOD BY AUTOMATED COUNT: 12.3 % (ref 11.5–14.5)
ERYTHROCYTE [SEDIMENTATION RATE] IN BLOOD BY WESTERGREN METHOD: <2 MM/HR (ref 0–36)
EST. GFR  (AFRICAN AMERICAN): >60 ML/MIN/1.73 M^2
EST. GFR  (NON AFRICAN AMERICAN): >60 ML/MIN/1.73 M^2
GLUCOSE SERPL-MCNC: 107 MG/DL (ref 70–110)
HCT VFR BLD AUTO: 40 % (ref 37–48.5)
HGB BLD-MCNC: 13.1 G/DL (ref 12–16)
IMM GRANULOCYTES # BLD AUTO: 0.01 K/UL (ref 0–0.04)
IMM GRANULOCYTES NFR BLD AUTO: 0.2 % (ref 0–0.5)
LYMPHOCYTES # BLD AUTO: 2 K/UL (ref 1–4.8)
LYMPHOCYTES NFR BLD: 30.9 % (ref 18–48)
MCH RBC QN AUTO: 31.1 PG (ref 27–31)
MCHC RBC AUTO-ENTMCNC: 32.8 G/DL (ref 32–36)
MCV RBC AUTO: 95 FL (ref 82–98)
MONOCYTES # BLD AUTO: 0.4 K/UL (ref 0.3–1)
MONOCYTES NFR BLD: 6.6 % (ref 4–15)
NEUTROPHILS # BLD AUTO: 3.1 K/UL (ref 1.8–7.7)
NEUTROPHILS NFR BLD: 48.4 % (ref 38–73)
NRBC BLD-RTO: 0 /100 WBC
PLATELET # BLD AUTO: 248 K/UL (ref 150–350)
PMV BLD AUTO: 9.9 FL (ref 9.2–12.9)
POTASSIUM SERPL-SCNC: 4 MMOL/L (ref 3.5–5.1)
PROT SERPL-MCNC: 6.3 G/DL (ref 6–8.4)
RBC # BLD AUTO: 4.21 M/UL (ref 4–5.4)
SODIUM SERPL-SCNC: 141 MMOL/L (ref 136–145)
WBC # BLD AUTO: 6.35 K/UL (ref 3.9–12.7)

## 2019-04-16 PROCEDURE — 86704 HEP B CORE ANTIBODY TOTAL: CPT

## 2019-04-16 PROCEDURE — 86140 C-REACTIVE PROTEIN: CPT

## 2019-04-16 PROCEDURE — 36415 COLL VENOUS BLD VENIPUNCTURE: CPT

## 2019-04-16 PROCEDURE — 86706 HEP B SURFACE ANTIBODY: CPT

## 2019-04-16 PROCEDURE — 85652 RBC SED RATE AUTOMATED: CPT

## 2019-04-16 PROCEDURE — 87340 HEPATITIS B SURFACE AG IA: CPT

## 2019-04-16 PROCEDURE — 85025 COMPLETE CBC W/AUTO DIFF WBC: CPT

## 2019-04-16 PROCEDURE — 80053 COMPREHEN METABOLIC PANEL: CPT

## 2019-04-16 RX ORDER — ABATACEPT 125 MG/ML
INJECTION, SOLUTION SUBCUTANEOUS
Qty: 4 ML | Refills: 4 | Status: SHIPPED | OUTPATIENT
Start: 2019-04-16 | End: 2019-09-27 | Stop reason: SDUPTHER

## 2019-04-17 ENCOUNTER — PATIENT MESSAGE (OUTPATIENT)
Dept: RHEUMATOLOGY | Facility: CLINIC | Age: 49
End: 2019-04-17

## 2019-04-17 LAB
HBV CORE AB SERPL QL IA: NEGATIVE
HBV SURFACE AB SER-ACNC: NEGATIVE M[IU]/ML
HBV SURFACE AG SERPL QL IA: NEGATIVE

## 2019-04-18 ENCOUNTER — OFFICE VISIT (OUTPATIENT)
Dept: RHEUMATOLOGY | Facility: CLINIC | Age: 49
End: 2019-04-18
Payer: COMMERCIAL

## 2019-04-18 VITALS
BODY MASS INDEX: 23.36 KG/M2 | SYSTOLIC BLOOD PRESSURE: 107 MMHG | HEIGHT: 67 IN | DIASTOLIC BLOOD PRESSURE: 75 MMHG | HEART RATE: 65 BPM | WEIGHT: 148.81 LBS

## 2019-04-18 DIAGNOSIS — D84.9 IMMUNOCOMPROMISED: ICD-10-CM

## 2019-04-18 DIAGNOSIS — Z51.81 MEDICATION MONITORING ENCOUNTER: Chronic | ICD-10-CM

## 2019-04-18 DIAGNOSIS — M05.79 RHEUMATOID ARTHRITIS INVOLVING MULTIPLE SITES WITH POSITIVE RHEUMATOID FACTOR: Primary | Chronic | ICD-10-CM

## 2019-04-18 PROCEDURE — 99214 OFFICE O/P EST MOD 30 MIN: CPT | Mod: S$GLB,,, | Performed by: PHYSICIAN ASSISTANT

## 2019-04-18 PROCEDURE — 99999 PR PBB SHADOW E&M-EST. PATIENT-LVL IV: ICD-10-PCS | Mod: PBBFAC,,, | Performed by: PHYSICIAN ASSISTANT

## 2019-04-18 PROCEDURE — 99999 PR PBB SHADOW E&M-EST. PATIENT-LVL IV: CPT | Mod: PBBFAC,,, | Performed by: PHYSICIAN ASSISTANT

## 2019-04-18 PROCEDURE — 3008F BODY MASS INDEX DOCD: CPT | Mod: CPTII,S$GLB,, | Performed by: PHYSICIAN ASSISTANT

## 2019-04-18 PROCEDURE — 3008F PR BODY MASS INDEX (BMI) DOCUMENTED: ICD-10-PCS | Mod: CPTII,S$GLB,, | Performed by: PHYSICIAN ASSISTANT

## 2019-04-18 PROCEDURE — 99214 PR OFFICE/OUTPT VISIT, EST, LEVL IV, 30-39 MIN: ICD-10-PCS | Mod: S$GLB,,, | Performed by: PHYSICIAN ASSISTANT

## 2019-04-18 ASSESSMENT — ROUTINE ASSESSMENT OF PATIENT INDEX DATA (RAPID3): MDHAQ FUNCTION SCORE: .5

## 2019-04-18 NOTE — PROGRESS NOTES
"Subjective:       Patient ID: Elizabeth Johns is a 49 y.o. female.    Chief Complaint: Swelling and Rheumatoid Arthritis    Elizabeth is here today Rheum follow up.  She has seropositive (+ccp) nonerosive rheumatoid arthritis. She has been on orencia for several years. Her Orencia injection  Out every 14-21 days  Had been doing very very well. This week is her 1st RA "flare" in 3 months. She took 30 mg pred yesterday and today woke up and the swelling, stiffness and pain had resolved.     She is taking cbd oil daily, turmeric, iron red krill, boswelia, juice plus   thinks it has helped keeping her healthy and RA quiet today rates   Pain 0/10.     Failed mtx monotherapy in the past. Off mtx due to side effects (fatigue and nausea). She is not on prednisone. In the past failed humira, enbrel and xeljanz these just did not help  Uses pennsaid topical bid prn. High titers CCP positive but sedimentation rate and CRP have been normal.  Rheumatoid factor negative.                   She reports no joint swelling. Pertinent negatives include no dysuria, fatigue, fever, myalgias or headaches.         Review of Systems   Constitutional: Negative for chills, fatigue and fever.   HENT: Negative for mouth sores, rhinorrhea and sore throat.    Eyes: Negative for pain and redness.   Respiratory: Negative for cough and shortness of breath.    Cardiovascular: Negative for chest pain.   Gastrointestinal: Negative for abdominal pain, constipation, diarrhea, nausea and vomiting.   Genitourinary: Negative for dysuria and hematuria.   Musculoskeletal: Negative for arthralgias, joint swelling and myalgias.   Skin: Negative for color change and rash.   Neurological: Negative for weakness, numbness (left 4th toe) and headaches.   Psychiatric/Behavioral: The patient is not nervous/anxious.          Objective:   /75   Pulse 65   Ht 5' 7" (1.702 m)   Wt 67.5 kg (148 lb 13 oz)   BMI 23.31 kg/m²      Physical Exam   Constitutional: She " is oriented to person, place, and time and well-developed, well-nourished, and in no distress.   HENT:   Head: Normocephalic and atraumatic.   Eyes: Pupils are equal, round, and reactive to light. Right eye exhibits no discharge.   Neck: Normal range of motion.   Cardiovascular: Normal rate, regular rhythm and normal heart sounds.  Exam reveals no friction rub.    Pulmonary/Chest: Effort normal and breath sounds normal. No respiratory distress.   Abdominal: Soft. She exhibits no distension. There is no tenderness.       Right Side Rheumatological Exam     Examination finds the shoulder, elbow, wrist, knee, 1st PIP, 1st MCP, 2nd PIP, 2nd MCP, 3rd PIP, 3rd MCP, 4th PIP, 4th MCP, 5th PIP and 5th MCP normal.    Left Side Rheumatological Exam     Examination finds the shoulder, elbow, wrist, knee, 1st PIP, 1st MCP, 2nd PIP, 2nd MCP, 3rd PIP, 3rd MCP, 4th PIP, 4th MCP, 5th PIP and 5th MCP normal.      Lymphadenopathy:     She has no cervical adenopathy.   Neurological: She is alert and oriented to person, place, and time.   Skin: Skin is warm. No rash noted. No erythema.     Fingertips are warm   Psychiatric: Mood normal.   Musculoskeletal: Normal range of motion. She exhibits no edema, tenderness or deformity.           Recent Results (from the past 168 hour(s))   Sedimentation rate, manual    Collection Time: 04/16/19  2:18 PM   Result Value Ref Range    Sed Rate <2 0 - 36 mm/Hr   C-reactive protein    Collection Time: 04/16/19  2:18 PM   Result Value Ref Range    CRP 1.0 0.0 - 8.2 mg/L   Comprehensive metabolic panel    Collection Time: 04/16/19  2:18 PM   Result Value Ref Range    Sodium 141 136 - 145 mmol/L    Potassium 4.0 3.5 - 5.1 mmol/L    Chloride 107 95 - 110 mmol/L    CO2 26 23 - 29 mmol/L    Glucose 107 70 - 110 mg/dL    BUN, Bld 20 6 - 20 mg/dL    Creatinine 0.9 0.5 - 1.4 mg/dL    Calcium 9.5 8.7 - 10.5 mg/dL    Total Protein 6.3 6.0 - 8.4 g/dL    Albumin 3.8 3.5 - 5.2 g/dL    Total Bilirubin 0.5 0.1 - 1.0  mg/dL    Alkaline Phosphatase 78 55 - 135 U/L    AST 25 10 - 40 U/L    ALT 27 10 - 44 U/L    Anion Gap 8 8 - 16 mmol/L    eGFR if African American >60 >60 mL/min/1.73 m^2    eGFR if non African American >60 >60 mL/min/1.73 m^2   CBC auto differential    Collection Time: 04/16/19  2:18 PM   Result Value Ref Range    WBC 6.35 3.90 - 12.70 K/uL    RBC 4.21 4.00 - 5.40 M/uL    Hemoglobin 13.1 12.0 - 16.0 g/dL    Hematocrit 40.0 37.0 - 48.5 %    MCV 95 82 - 98 fL    MCH 31.1 (H) 27.0 - 31.0 pg    MCHC 32.8 32.0 - 36.0 g/dL    RDW 12.3 11.5 - 14.5 %    Platelets 248 150 - 350 K/uL    MPV 9.9 9.2 - 12.9 fL    Immature Granulocytes 0.2 0.0 - 0.5 %    Gran # (ANC) 3.1 1.8 - 7.7 K/uL    Immature Grans (Abs) 0.01 0.00 - 0.04 K/uL    Lymph # 2.0 1.0 - 4.8 K/uL    Mono # 0.4 0.3 - 1.0 K/uL    Eos # 0.8 (H) 0.0 - 0.5 K/uL    Baso # 0.06 0.00 - 0.20 K/uL    nRBC 0 0 /100 WBC    Gran% 48.4 38.0 - 73.0 %    Lymph% 30.9 18.0 - 48.0 %    Mono% 6.6 4.0 - 15.0 %    Eosinophil% 13.2 (H) 0.0 - 8.0 %    Basophil% 0.9 0.0 - 1.9 %    Differential Method Automated    Hepatitis B core antibody, total    Collection Time: 04/16/19  2:18 PM   Result Value Ref Range    Hep B Core Total Ab Negative    Hepatitis B surface antibody    Collection Time: 04/16/19  2:18 PM   Result Value Ref Range    Hep B S Ab Negative    Hepatitis B surface antigen    Collection Time: 04/16/19  2:18 PM   Result Value Ref Range    Hepatitis B Surface Ag Negative          Immunization History   Administered Date(s) Administered    DTaP 06/26/2009    Influenza - High Dose 11/09/2016, 11/14/2017, 11/21/2018    Influenza - Quadrivalent 11/05/2014, 10/19/2015    Influenza Split 10/10/2013    Pneumococcal Conjugate - 13 Valent 12/19/2014    Pneumococcal Polysaccharide - 23 Valent 12/02/2015    Tdap 07/21/2016    Zoster 10/10/2013         12/2017- [facility]     Assessment:       1. Rheumatoid arthritis involving multiple sites with positive rheumatoid  factor    2. Medication monitoring encounter    3. Immunocompromised            Impression:  Seropositive rheumatoid arthritis -well controlled   currently on orencia 125mg SQ every 21 days     0 S/T joint --CDAI 0, BHASKAR 0    oral methotrexate because of side effects  In the past failed methotrexate monotherapy, failed Humira, Enbrel and Xeljanz    Immunocompromised:  Vaccines up-to-date      Medication monitoring with no toxicity side effects, chronic immunosuppression with no recurrent infections            Plan:           C/w otc supplements    Prn use of  meloxicam 7.5 mg at night with food  Orencia Q 14-21 days    X-ray next visti    Topical Voltaren gel  4 g qid prn   rtc 4 months with labs reg 4 and [facility]

## 2019-04-18 NOTE — PATIENT INSTRUCTIONS
Can try turmeric (curcumin longa root) 500 mg tablets,   Start 500 mg tab twice a day and can work up to max does of 1000 mg twice a day if needed    Laruent red krill oil-

## 2019-06-07 ENCOUNTER — PATIENT MESSAGE (OUTPATIENT)
Dept: RHEUMATOLOGY | Facility: CLINIC | Age: 49
End: 2019-06-07

## 2019-06-11 ENCOUNTER — PATIENT MESSAGE (OUTPATIENT)
Dept: RHEUMATOLOGY | Facility: CLINIC | Age: 49
End: 2019-06-11

## 2019-06-11 ENCOUNTER — HOSPITAL ENCOUNTER (OUTPATIENT)
Dept: RADIOLOGY | Facility: HOSPITAL | Age: 49
Discharge: HOME | End: 2019-06-11
Attending: PHYSICIAN ASSISTANT
Payer: COMMERCIAL

## 2019-06-11 ENCOUNTER — OFFICE VISIT (OUTPATIENT)
Dept: RHEUMATOLOGY | Facility: CLINIC | Age: 49
End: 2019-06-11
Payer: COMMERCIAL

## 2019-06-11 VITALS
HEART RATE: 55 BPM | HEIGHT: 67 IN | SYSTOLIC BLOOD PRESSURE: 109 MMHG | WEIGHT: 143.5 LBS | DIASTOLIC BLOOD PRESSURE: 68 MMHG | BODY MASS INDEX: 22.52 KG/M2

## 2019-06-11 DIAGNOSIS — M05.771 RHEUMATOID ARTHRITIS INVOLVING RIGHT FOOT WITH POSITIVE RHEUMATOID FACTOR: ICD-10-CM

## 2019-06-11 DIAGNOSIS — M05.79 RHEUMATOID ARTHRITIS INVOLVING MULTIPLE SITES WITH POSITIVE RHEUMATOID FACTOR: Primary | Chronic | ICD-10-CM

## 2019-06-11 DIAGNOSIS — D84.9 IMMUNOCOMPROMISED: ICD-10-CM

## 2019-06-11 DIAGNOSIS — Z51.81 MEDICATION MONITORING ENCOUNTER: Chronic | ICD-10-CM

## 2019-06-11 DIAGNOSIS — M05.79 RHEUMATOID ARTHRITIS INVOLVING MULTIPLE SITES WITH POSITIVE RHEUMATOID FACTOR: ICD-10-CM

## 2019-06-11 PROCEDURE — 99214 PR OFFICE/OUTPT VISIT, EST, LEVL IV, 30-39 MIN: ICD-10-PCS | Mod: 25,S$GLB,, | Performed by: INTERNAL MEDICINE

## 2019-06-11 PROCEDURE — 73630 X-RAY EXAM OF FOOT: CPT | Mod: 26,,, | Performed by: RADIOLOGY

## 2019-06-11 PROCEDURE — 73130 X-RAY EXAM OF HAND: CPT | Mod: 50,TC

## 2019-06-11 PROCEDURE — 3008F BODY MASS INDEX DOCD: CPT | Mod: CPTII,S$GLB,, | Performed by: INTERNAL MEDICINE

## 2019-06-11 PROCEDURE — 3008F PR BODY MASS INDEX (BMI) DOCUMENTED: ICD-10-PCS | Mod: CPTII,S$GLB,, | Performed by: INTERNAL MEDICINE

## 2019-06-11 PROCEDURE — 99214 OFFICE O/P EST MOD 30 MIN: CPT | Mod: 25,S$GLB,, | Performed by: INTERNAL MEDICINE

## 2019-06-11 PROCEDURE — 73630 X-RAY EXAM OF FOOT: CPT | Mod: 50,TC

## 2019-06-11 PROCEDURE — 73130 X-RAY EXAM OF HAND: CPT | Mod: 26,,, | Performed by: RADIOLOGY

## 2019-06-11 PROCEDURE — 99999 PR PBB SHADOW E&M-EST. PATIENT-LVL III: CPT | Mod: PBBFAC,,, | Performed by: INTERNAL MEDICINE

## 2019-06-11 PROCEDURE — 20600 DRAIN/INJ JOINT/BURSA W/O US: CPT | Mod: RT,S$GLB,, | Performed by: INTERNAL MEDICINE

## 2019-06-11 PROCEDURE — 99999 PR PBB SHADOW E&M-EST. PATIENT-LVL III: ICD-10-PCS | Mod: PBBFAC,,, | Performed by: INTERNAL MEDICINE

## 2019-06-11 PROCEDURE — 73630 XR FOOT COMPLETE 3 VIEW BILATERAL: ICD-10-PCS | Mod: 26,,, | Performed by: RADIOLOGY

## 2019-06-11 PROCEDURE — 20600 SMALL JOINT ASPIRATION/INJECTION: R GREAT MTP: ICD-10-PCS | Mod: RT,S$GLB,, | Performed by: INTERNAL MEDICINE

## 2019-06-11 PROCEDURE — 73130 XR HAND COMPLETE 3 VIEWS BILATERAL: ICD-10-PCS | Mod: 26,,, | Performed by: RADIOLOGY

## 2019-06-11 RX ADMIN — BETAMETHASONE SODIUM PHOSPHATE AND BETAMETHASONE ACETATE 1.5 MG: 3; 3 INJECTION, SUSPENSION INTRA-ARTICULAR; INTRALESIONAL; INTRAMUSCULAR; SOFT TISSUE at 03:06

## 2019-06-11 ASSESSMENT — ROUTINE ASSESSMENT OF PATIENT INDEX DATA (RAPID3): MDHAQ FUNCTION SCORE: 0

## 2019-06-11 NOTE — PROCEDURES
Small Joint Aspiration/Injection: R great MTP  Date/Time: 6/11/2019 3:50 PM  Performed by: Osmar Childers MD  Authorized by: Osmar Childers MD     Consent Done?:  Yes (Verbal)  Indications:  Pain and joint swelling  Site marked: The procedure site was marked    Timeout: Prior to procedure the correct patient, procedure, and site was verified      Location:  Great toe  Site:  R great MTP  Prep: Patient was prepped and draped in usual sterile fashion    Ultrasonic Guidance for needle placement: No  Needle size:  25 G  Approach:  Dorsal  Medications:  10 mg triamcinolone acetonide 10 mg/mL; 1.5 mg betamethasone acetate-betamethasone sodium phosphate 6 mg/mL     Additional Comments: Procedure note: After verbal consent was obtained. The R 1st MTP joint  was prepared with sterile prep.  The skin was anesthetized with 1% ethyl chloride.  The right 1st MTP joint was injected with 1cc of 1% lidocaine to anesthetize the joint.  The 1st MTP  joint was then injected with 1.5 mg celestone/soluspan, 10 mg triamcinolone acetonide( 40 milligrams/cc), and 0.25 cc of 1% lidocaine.  Hemostasis was obtained.  The patient tolerated procedure well with no complications.  Discharge and  icing instructions given to patient and precautions about not overdoing it on her feet

## 2019-06-11 NOTE — PATIENT INSTRUCTIONS
Ice pack for 10 min 2-3 x day to R first toe    Continue Orencia every 2 weeks    xrays today    Advil for 2- 3 doses in next day    No heavy exercise on toe for next 3 days    Keep appt in aug

## 2019-06-30 RX ORDER — BETAMETHASONE SODIUM PHOSPHATE AND BETAMETHASONE ACETATE 3; 3 MG/ML; MG/ML
1.5 INJECTION, SUSPENSION INTRA-ARTICULAR; INTRALESIONAL; INTRAMUSCULAR; SOFT TISSUE
Status: DISCONTINUED | OUTPATIENT
Start: 2019-06-11 | End: 2019-06-30 | Stop reason: HOSPADM

## 2019-06-30 NOTE — PROGRESS NOTES
Subjective:       Patient ID: Elizabeth Johns is a 49 y.o. female.    Chief Complaint:  Right 1st MTP pain-6/10    Elizabeth is followed with seropositive (+ccp) nonerosive rheumatoid arthritis. She has been on orencia for several years.  She was last seen in April by LUCIAN and doing well.  She was continued on Orencia 125 mg every 2-3 weeks with p.r.n. meloxicam 7.5 mg.  She will occasionally use topical gel to a finger joint or a toe that hurts.  Over the last few weeks she has had increasing pain involving the right 1st MTP.  She rates the pain at 6/10 She denies trauma or injury.  She denies fever chills sweats.  It is interfering with her normal activities and she would like this injected if as possible.  She has responded well to injections in the past with no complications.       She is taking cbd oil daily, turmeric, iron red krill, boswelia, juice plus and believes has helped keeping her healthy and RA quiet today rates       Failed mtx monotherapy in the past. Off mtx due to side effects (fatigue and nausea). She is not on prednisone. In the past failed humira, enbrel and xeljanz these just did not help  Uses pennsaid topical bid prn. High titers CCP positive but sedimentation rate and CRP have been normal.  Rheumatoid factor negative.                   She complains of joint swelling. Pertinent negatives include no dysuria, fatigue, fever, myalgias or headaches.         Review of Systems   Constitutional: Negative for chills, fatigue and fever.   HENT: Negative for mouth sores, rhinorrhea and sore throat.    Eyes: Negative for pain and redness.   Respiratory: Negative for cough and shortness of breath.    Cardiovascular: Negative for chest pain.   Gastrointestinal: Negative for abdominal pain, constipation, diarrhea, nausea and vomiting.   Genitourinary: Negative for dysuria and hematuria.   Musculoskeletal: Positive for arthralgias and joint swelling. Negative for myalgias.        See HPI flare of right  "1st toe joint   Skin: Negative for color change and rash.   Allergic/Immunologic: Positive for immunocompromised state.   Neurological: Negative for weakness, numbness (left 4th toe) and headaches.   Psychiatric/Behavioral: Negative for agitation and suicidal ideas. The patient is not nervous/anxious.        PAST HISTORY, SOCIAL HISTORY, ALLERGIES-REVIEWED IN Epic      Objective:   /68   Pulse (!) 55   Ht 5' 7" (1.702 m)   Wt 65.1 kg (143 lb 8.3 oz)   BMI 22.48 kg/m²      Physical Exam   Constitutional: She is oriented to person, place, and time and well-developed, well-nourished, and in no distress.   HENT:   Head: Normocephalic and atraumatic.   Eyes: Pupils are equal, round, and reactive to light. Right eye exhibits no discharge.   Neck: Normal range of motion.   Cardiovascular: Normal rate, regular rhythm and normal heart sounds.  Exam reveals no friction rub.    No murmur heard.  Pulmonary/Chest: Effort normal and breath sounds normal. No respiratory distress. She has no wheezes. She has no rales.   Abdominal: Soft. She exhibits no distension. There is no tenderness.       Right Side Rheumatological Exam     Examination finds the shoulder, elbow, wrist, knee, 1st PIP, 1st MCP, 2nd PIP, 2nd MCP, 3rd PIP, 3rd MCP, 4th PIP, 4th MCP, 5th PIP and 5th MCP normal.    Left Side Rheumatological Exam     Examination finds the shoulder, elbow, wrist, knee, 1st PIP, 1st MCP, 2nd PIP, 2nd MCP, 3rd PIP, 3rd MCP, 4th PIP, 4th MCP, 5th PIP and 5th MCP normal.      Lymphadenopathy:     She has no cervical adenopathy.   Neurological: She is alert and oriented to person, place, and time.   Skin: Skin is warm. No rash noted. No erythema.     Fingertips are warm   Psychiatric: Mood and affect normal.   Musculoskeletal: Normal range of motion. She exhibits no edema, tenderness or deformity.   Right foot shows a 2+ tender 1+ swollen right 1st MTP.  It has minimal heat and no significant redness.  The 2nd through the 5th " MTPs are not tender or swollen.  Midfoot is not tender.  Ankle is good motion without swelling or tenderness.  Left foot has normal range of motion with minimal large been the 1st MTP not tender  Hand shows no synovitis with good motion PIP MPs and wrist.  Elbows and shoulders move well as do the hips without tenderness           Results for orders placed or performed in visit on 04/16/19   Sedimentation rate, manual   Result Value Ref Range    Sed Rate <2 0 - 36 mm/Hr   C-reactive protein   Result Value Ref Range    CRP 1.0 0.0 - 8.2 mg/L   Comprehensive metabolic panel   Result Value Ref Range    Sodium 141 136 - 145 mmol/L    Potassium 4.0 3.5 - 5.1 mmol/L    Chloride 107 95 - 110 mmol/L    CO2 26 23 - 29 mmol/L    Glucose 107 70 - 110 mg/dL    BUN, Bld 20 6 - 20 mg/dL    Creatinine 0.9 0.5 - 1.4 mg/dL    Calcium 9.5 8.7 - 10.5 mg/dL    Total Protein 6.3 6.0 - 8.4 g/dL    Albumin 3.8 3.5 - 5.2 g/dL    Total Bilirubin 0.5 0.1 - 1.0 mg/dL    Alkaline Phosphatase 78 55 - 135 U/L    AST 25 10 - 40 U/L    ALT 27 10 - 44 U/L    Anion Gap 8 8 - 16 mmol/L    eGFR if African American >60 >60 mL/min/1.73 m^2    eGFR if non African American >60 >60 mL/min/1.73 m^2   CBC auto differential   Result Value Ref Range    WBC 6.35 3.90 - 12.70 K/uL    RBC 4.21 4.00 - 5.40 M/uL    Hemoglobin 13.1 12.0 - 16.0 g/dL    Hematocrit 40.0 37.0 - 48.5 %    Mean Corpuscular Volume 95 82 - 98 fL    Mean Corpuscular Hemoglobin 31.1 (H) 27.0 - 31.0 pg    Mean Corpuscular Hemoglobin Conc 32.8 32.0 - 36.0 g/dL    RDW 12.3 11.5 - 14.5 %    Platelets 248 150 - 350 K/uL    MPV 9.9 9.2 - 12.9 fL    Immature Granulocytes 0.2 0.0 - 0.5 %    Gran # (ANC) 3.1 1.8 - 7.7 K/uL    Immature Grans (Abs) 0.01 0.00 - 0.04 K/uL    Lymph # 2.0 1.0 - 4.8 K/uL    Mono # 0.4 0.3 - 1.0 K/uL    Eos # 0.8 (H) 0.0 - 0.5 K/uL    Baso # 0.06 0.00 - 0.20 K/uL    nRBC 0 0 /100 WBC    Gran% 48.4 38.0 - 73.0 %    Lymph% 30.9 18.0 - 48.0 %    Mono% 6.6 4.0 - 15.0 %     Eosinophil% 13.2 (H) 0.0 - 8.0 %    Basophil% 0.9 0.0 - 1.9 %    Differential Method Automated    Hepatitis B core antibody, total   Result Value Ref Range    Hep B Core Total Ab Negative    Hepatitis B surface antibody   Result Value Ref Range    Hep B S Ab Negative    Hepatitis B surface antigen   Result Value Ref Range    Hepatitis B Surface Ag Negative          Immunization History   Administered Date(s) Administered    DTaP 06/26/2009    Influenza - High Dose 11/09/2016, 11/14/2017, 11/21/2018    Influenza - Quadrivalent 11/05/2014, 10/19/2015    Influenza Split 10/10/2013    Pneumococcal Conjugate - 13 Valent 12/19/2014    Pneumococcal Polysaccharide - 23 Valent 12/02/2015    Tdap 07/21/2016    Zoster 10/10/2013         12/2017- [facility]     Assessment:       1. Rheumatoid arthritis involving multiple sites with positive rheumatoid factor    2. Rheumatoid arthritis involving right foot with positive rheumatoid factor            Impression:  Seropositive rheumatoid arthritis -CDAI-7, Concepcion score 0, recent sed rate and CRP normal-pain driven by the right 1st toe  currently on orencia 125mg SQ every 14- 21 days   In the past failed methotrexate monotherapy, failed Humira, Enbrel and Xeljanz    Acute Right 1st toe pain-likely related to degenerative arthritis.  Cannot exclude component of rheumatoid    Immunocompromised:  Vaccines up-to-date except for new shingles vaccine     Medication monitoring with no toxicity side effects, chronic immunosuppression with no recurrent infections            Plan:         Inject the right 1st MTP-see procedure note    Ice pack for 10 min 2-3 x day to R first toe    Continue Orencia-125 mg subcu every 2 weeks    xrays today    Advil for 2- 3 doses in next day    No heavy exercise on toe for next 3 days    Keep appt in aug, discuss the new shingles vaccine at that time    C/w otc supplements    Prn use of  meloxicam 7.5 mg at night with food    Prolonged  visit: Over 35 min spent in patient care and evaluation. Over 1 25 min of time used in reviewing recent labs, prior x-rays and current foot symptoms, discussing diagnostic possibilities, counseling on medication and injection options, explaining side effects of therapies, and coordination of care with  Family Dr   Patient's questions were all addressed and she understands our plans and risks.

## 2019-07-22 ENCOUNTER — OFFICE VISIT (OUTPATIENT)
Dept: RHEUMATOLOGY | Facility: CLINIC | Age: 49
End: 2019-07-22
Payer: COMMERCIAL

## 2019-07-22 ENCOUNTER — PATIENT MESSAGE (OUTPATIENT)
Dept: RHEUMATOLOGY | Facility: CLINIC | Age: 49
End: 2019-07-22

## 2019-07-22 DIAGNOSIS — M06.011 RHEUMATOID ARTHRITIS INVOLVING RIGHT SHOULDER WITH NEGATIVE RHEUMATOID FACTOR: Primary | ICD-10-CM

## 2019-07-22 DIAGNOSIS — M06.072: ICD-10-CM

## 2019-07-22 PROCEDURE — 20600 SMALL JOINT ASPIRATION/INJECTION: R GREAT MTP: ICD-10-PCS | Mod: 59,LT,S$GLB, | Performed by: INTERNAL MEDICINE

## 2019-07-22 PROCEDURE — 20600 DRAIN/INJ JOINT/BURSA W/O US: CPT | Mod: 59,LT,S$GLB, | Performed by: INTERNAL MEDICINE

## 2019-07-22 PROCEDURE — 99999 PR PBB SHADOW E&M-EST. PATIENT-LVL II: ICD-10-PCS | Mod: PBBFAC,,, | Performed by: INTERNAL MEDICINE

## 2019-07-22 PROCEDURE — 99214 OFFICE O/P EST MOD 30 MIN: CPT | Mod: 25,S$GLB,, | Performed by: INTERNAL MEDICINE

## 2019-07-22 PROCEDURE — 20610 DRAIN/INJ JOINT/BURSA W/O US: CPT | Mod: RT,S$GLB,, | Performed by: INTERNAL MEDICINE

## 2019-07-22 PROCEDURE — 99214 PR OFFICE/OUTPT VISIT, EST, LEVL IV, 30-39 MIN: ICD-10-PCS | Mod: 25,S$GLB,, | Performed by: INTERNAL MEDICINE

## 2019-07-22 PROCEDURE — 99999 PR PBB SHADOW E&M-EST. PATIENT-LVL II: CPT | Mod: PBBFAC,,, | Performed by: INTERNAL MEDICINE

## 2019-07-22 PROCEDURE — 20610 LARGE JOINT ASPIRATION/INJECTION: R SUBACROMIAL BURSA: ICD-10-PCS | Mod: RT,S$GLB,, | Performed by: INTERNAL MEDICINE

## 2019-07-22 RX ORDER — METHYLPREDNISOLONE ACETATE 80 MG/ML
20 INJECTION, SUSPENSION INTRA-ARTICULAR; INTRALESIONAL; INTRAMUSCULAR; SOFT TISSUE
Status: COMPLETED | OUTPATIENT
Start: 2019-07-22 | End: 2019-07-22

## 2019-07-22 RX ORDER — BETAMETHASONE SODIUM PHOSPHATE AND BETAMETHASONE ACETATE 3; 3 MG/ML; MG/ML
1.5 INJECTION, SUSPENSION INTRA-ARTICULAR; INTRALESIONAL; INTRAMUSCULAR; SOFT TISSUE
Status: DISCONTINUED | OUTPATIENT
Start: 2019-07-22 | End: 2019-07-22 | Stop reason: HOSPADM

## 2019-07-22 RX ORDER — BETAMETHASONE SODIUM PHOSPHATE AND BETAMETHASONE ACETATE 3; 3 MG/ML; MG/ML
0.08 INJECTION, SUSPENSION INTRA-ARTICULAR; INTRALESIONAL; INTRAMUSCULAR; SOFT TISSUE
Status: COMPLETED | OUTPATIENT
Start: 2019-07-22 | End: 2019-07-22

## 2019-07-22 RX ORDER — METHYLPREDNISOLONE ACETATE 80 MG/ML
40 INJECTION, SUSPENSION INTRA-ARTICULAR; INTRALESIONAL; INTRAMUSCULAR; SOFT TISSUE
Status: DISCONTINUED | OUTPATIENT
Start: 2019-07-22 | End: 2019-07-22 | Stop reason: HOSPADM

## 2019-07-22 RX ORDER — METHYLPREDNISOLONE ACETATE 40 MG/ML
40 INJECTION, SUSPENSION INTRA-ARTICULAR; INTRALESIONAL; INTRAMUSCULAR; SOFT TISSUE
Status: DISCONTINUED | OUTPATIENT
Start: 2019-07-22 | End: 2019-07-22 | Stop reason: HOSPADM

## 2019-07-22 RX ORDER — BETAMETHASONE SODIUM PHOSPHATE AND BETAMETHASONE ACETATE 3; 3 MG/ML; MG/ML
3 INJECTION, SUSPENSION INTRA-ARTICULAR; INTRALESIONAL; INTRAMUSCULAR; SOFT TISSUE
Status: DISCONTINUED | OUTPATIENT
Start: 2019-07-22 | End: 2019-07-22

## 2019-07-22 RX ORDER — BETAMETHASONE SODIUM PHOSPHATE AND BETAMETHASONE ACETATE 3; 3 MG/ML; MG/ML
1.5 INJECTION, SUSPENSION INTRA-ARTICULAR; INTRALESIONAL; INTRAMUSCULAR; SOFT TISSUE
Status: COMPLETED | OUTPATIENT
Start: 2019-07-22 | End: 2019-07-22

## 2019-07-22 RX ORDER — METHYLPREDNISOLONE ACETATE 80 MG/ML
10 INJECTION, SUSPENSION INTRA-ARTICULAR; INTRALESIONAL; INTRAMUSCULAR; SOFT TISSUE
Status: COMPLETED | OUTPATIENT
Start: 2019-07-22 | End: 2019-07-22

## 2019-07-22 RX ADMIN — BETAMETHASONE SODIUM PHOSPHATE AND BETAMETHASONE ACETATE 0.06 MG: 3; 3 INJECTION, SUSPENSION INTRA-ARTICULAR; INTRALESIONAL; INTRAMUSCULAR; SOFT TISSUE at 05:07

## 2019-07-22 RX ADMIN — METHYLPREDNISOLONE ACETATE 10.4 MG: 80 INJECTION, SUSPENSION INTRA-ARTICULAR; INTRALESIONAL; INTRAMUSCULAR; SOFT TISSUE at 05:07

## 2019-07-22 RX ADMIN — BETAMETHASONE SODIUM PHOSPHATE AND BETAMETHASONE ACETATE 1.5 MG: 3; 3 INJECTION, SUSPENSION INTRA-ARTICULAR; INTRALESIONAL; INTRAMUSCULAR; SOFT TISSUE at 04:07

## 2019-07-22 RX ADMIN — BETAMETHASONE SODIUM PHOSPHATE AND BETAMETHASONE ACETATE 1.5 MG: 3; 3 INJECTION, SUSPENSION INTRA-ARTICULAR; INTRALESIONAL; INTRAMUSCULAR; SOFT TISSUE at 05:07

## 2019-07-22 RX ADMIN — METHYLPREDNISOLONE ACETATE 40 MG: 80 INJECTION, SUSPENSION INTRA-ARTICULAR; INTRALESIONAL; INTRAMUSCULAR; SOFT TISSUE at 04:07

## 2019-07-22 RX ADMIN — METHYLPREDNISOLONE ACETATE 40 MG: 40 INJECTION, SUSPENSION INTRA-ARTICULAR; INTRALESIONAL; INTRAMUSCULAR; SOFT TISSUE at 04:07

## 2019-07-22 RX ADMIN — METHYLPREDNISOLONE ACETATE 20 MG: 80 INJECTION, SUSPENSION INTRA-ARTICULAR; INTRALESIONAL; INTRAMUSCULAR; SOFT TISSUE at 05:07

## 2019-07-22 NOTE — PROGRESS NOTES
Subjective:       Patient ID: Elizabeth Johns is a 49 y.o. female.    Chief Complaint:  Right 1st MTP pain-6/10    Elizabeth is followed with seropositive (+ccp) nonerosive rheumatoid arthritis. She has been on orencia for several years.  She was last seen in early June by myself because she had a flare involving her right 1st MTP joint.  We injected the joint locally and she did well..  She was continued on Orencia 125 mg every 2-3 weeks with p.r.n. meloxicam 7.5 mg.  Over the last 3 weeks she has had increasing pain involving the right 1st MTP and the right shoulder.  She rates the pain at 5/10 .  She has had no fever chills or sweats  She denies trauma or injury, but she does exercise regularly and uses weights which could be aggravating her shoulder.  She has been on her feet a lot with her job as well .   these pains are nterfering with her normal activities and she would like these areas injected if as possible.  She has responded well to injections in the past with no complications.       She is taking cbd oil daily, turmeric, iron red krill, boswelia, juice plus and exercises daily     Pertinent past history-Failed mtx monotherapy in the past. Off mtx due to side effects (fatigue and nausea). She is not on prednisone. In the past failed humira, enbrel and xeljanz these just did not help  Uses pennsaid topical bid prn. High titers CCP positive but sedimentation rate and CRP have been normal.  Rheumatoid factor negative.                   She complains of joint swelling. Pertinent negatives include no dysuria, fatigue, fever, myalgias or headaches.       Shoulder Pain   Associated symptoms include arthralgias and joint swelling. Pertinent negatives include no abdominal pain, chest pain, chills, coughing, fatigue, fever, headaches, myalgias, nausea, numbness (left 4th toe), rash, sore throat, vomiting or weakness.   Foot Pain   Associated symptoms include arthralgias and joint swelling. Pertinent negatives  include no abdominal pain, chest pain, chills, coughing, fatigue, fever, headaches, myalgias, nausea, numbness (left 4th toe), rash, sore throat, vomiting or weakness.     Review of Systems   Constitutional: Negative for chills, fatigue and fever.   HENT: Negative for mouth sores, rhinorrhea and sore throat.    Eyes: Negative for pain and redness.   Respiratory: Negative for cough and shortness of breath.    Cardiovascular: Negative for chest pain.   Gastrointestinal: Negative for abdominal pain, constipation, diarrhea, nausea and vomiting.   Genitourinary: Negative for dysuria and hematuria.   Musculoskeletal: Positive for arthralgias and joint swelling. Negative for myalgias.        See HPI flare of left 1st MTP right shoulder   Skin: Negative for color change and rash.   Allergic/Immunologic: Positive for immunocompromised state.   Neurological: Negative for weakness, numbness (left 4th toe) and headaches.   Psychiatric/Behavioral: Negative for agitation and suicidal ideas. The patient is not nervous/anxious.        PAST HISTORY, SOCIAL HISTORY, ALLERGIES-REVIEWED IN Epic      Objective:   Vital signs blood pressure 106/66, pulse 64, weight 63.9 kg and stable, pain score 5/10     Physical Exam   Constitutional: She is oriented to person, place, and time and well-developed, well-nourished, and in no distress.   HENT:   Head: Normocephalic and atraumatic.   Eyes: Pupils are equal, round, and reactive to light. Right eye exhibits no discharge.   Neck: Normal range of motion.   Cardiovascular: Normal rate, regular rhythm and normal heart sounds.    Pulmonary/Chest: Effort normal and breath sounds normal. No respiratory distress.   Abdominal: Soft.       Right Side Rheumatological Exam     Examination finds the elbow, wrist, knee, 1st PIP, 1st MCP, 2nd PIP, 2nd MCP, 3rd PIP, 3rd MCP, 4th PIP, 4th MCP, 5th PIP and 5th MCP normal.    The patient is tender to palpation of the shoulder    Left Side Rheumatological Exam      Examination finds the shoulder, elbow, wrist, knee, 1st PIP, 1st MCP, 2nd PIP, 2nd MCP, 3rd PIP, 3rd MCP, 4th PIP, 4th MCP, 5th PIP and 5th MCP normal.      Lymphadenopathy:     She has no cervical adenopathy.   Neurological: She is alert and oriented to person, place, and time.   Skin: Skin is warm. No rash noted. No erythema.     Fingertips are warm   Psychiatric: Mood and affect normal.   Musculoskeletal: Normal range of motion. She exhibits no edema or deformity.   Left foot foot shows a 2+ tender 1+ swollen left 1st MTP.  It has minimal heat and no significant redness.  The 2nd through the 5th MTPs are not tender or swollen.  Midfoot is not tender.  Ankle is good motion without swelling or tenderness.  Right foot has normal range of motion with 1+ enlargement of the 1st MTP- not tender today  Hand shows no synovitis with good motion PIP MPs and wrist.  Right shoulder has tenderness with active abduction and flexion particularly medially.  The medial rotator cuff and coracoid process area are tender.  There is no heat or redness.  The long head of the biceps is not tender.  There is no effusion in the joint itself and there is good range of motion of the glenohumeral joint with the arm at her side  Elbows and left shoulders move well as do the hips without tenderness           Results for orders placed or performed in visit on 04/16/19   Sedimentation rate, manual   Result Value Ref Range    Sed Rate <2 0 - 36 mm/Hr   C-reactive protein   Result Value Ref Range    CRP 1.0 0.0 - 8.2 mg/L   Comprehensive metabolic panel   Result Value Ref Range    Sodium 141 136 - 145 mmol/L    Potassium 4.0 3.5 - 5.1 mmol/L    Chloride 107 95 - 110 mmol/L    CO2 26 23 - 29 mmol/L    Glucose 107 70 - 110 mg/dL    BUN, Bld 20 6 - 20 mg/dL    Creatinine 0.9 0.5 - 1.4 mg/dL    Calcium 9.5 8.7 - 10.5 mg/dL    Total Protein 6.3 6.0 - 8.4 g/dL    Albumin 3.8 3.5 - 5.2 g/dL    Total Bilirubin 0.5 0.1 - 1.0 mg/dL    Alkaline  Phosphatase 78 55 - 135 U/L    AST 25 10 - 40 U/L    ALT 27 10 - 44 U/L    Anion Gap 8 8 - 16 mmol/L    eGFR if African American >60 >60 mL/min/1.73 m^2    eGFR if non African American >60 >60 mL/min/1.73 m^2   CBC auto differential   Result Value Ref Range    WBC 6.35 3.90 - 12.70 K/uL    RBC 4.21 4.00 - 5.40 M/uL    Hemoglobin 13.1 12.0 - 16.0 g/dL    Hematocrit 40.0 37.0 - 48.5 %    Mean Corpuscular Volume 95 82 - 98 fL    Mean Corpuscular Hemoglobin 31.1 (H) 27.0 - 31.0 pg    Mean Corpuscular Hemoglobin Conc 32.8 32.0 - 36.0 g/dL    RDW 12.3 11.5 - 14.5 %    Platelets 248 150 - 350 K/uL    MPV 9.9 9.2 - 12.9 fL    Immature Granulocytes 0.2 0.0 - 0.5 %    Gran # (ANC) 3.1 1.8 - 7.7 K/uL    Immature Grans (Abs) 0.01 0.00 - 0.04 K/uL    Lymph # 2.0 1.0 - 4.8 K/uL    Mono # 0.4 0.3 - 1.0 K/uL    Eos # 0.8 (H) 0.0 - 0.5 K/uL    Baso # 0.06 0.00 - 0.20 K/uL    nRBC 0 0 /100 WBC    Gran% 48.4 38.0 - 73.0 %    Lymph% 30.9 18.0 - 48.0 %    Mono% 6.6 4.0 - 15.0 %    Eosinophil% 13.2 (H) 0.0 - 8.0 %    Basophil% 0.9 0.0 - 1.9 %    Differential Method Automated    Hepatitis B core antibody, total   Result Value Ref Range    Hep B Core Total Ab Negative    Hepatitis B surface antibody   Result Value Ref Range    Hep B S Ab Negative    Hepatitis B surface antigen   Result Value Ref Range    Hepatitis B Surface Ag Negative          Immunization History   Administered Date(s) Administered    DTaP 06/26/2009    Influenza - High Dose 11/09/2016, 11/14/2017, 11/21/2018    Influenza - Quadrivalent 11/05/2014, 10/19/2015    Influenza Split 10/10/2013    Pneumococcal Conjugate - 13 Valent 12/19/2014    Pneumococcal Polysaccharide - 23 Valent 12/02/2015    Tdap 07/21/2016    Zoster 10/10/2013     Satya Bautista MD 6/11/2019 Routine      Narrative     EXAMINATION:  XR HAND COMPLETE 3 VIEWS BILATERAL    CLINICAL HISTORY:  . Rheumatoid arthritis with rheumatoid factor of multiple sites without organ or systems  involvement    TECHNIQUE:  PA, lateral, and oblique views of both hands were performed.    COMPARISON:  12/07/2017    FINDINGS:  No acute abnormality or significant change with minimal degenerative findings.  No concerning erosions.     Satya Bautista MD 6/11/2019 Routine      Narrative     EXAMINATION:  XR FOOT COMPLETE 3 VIEW BILATERAL    CLINICAL HISTORY:  Rheumatoid arthritis with rheumatoid factor of multiple sites without organ or systems involvement    TECHNIQUE:  AP, lateral, and oblique views of both feet were performed.    COMPARISON:  12/07/2017    FINDINGS:  No acute osseous abnormality with similar 1st MTP joint degenerative changes bilaterally.  No concerning erosions.         12/2017- [facility] foot x-ray     Assessment:                   Impression:  Seropositive rheumatoid arthritis -CDAI-6, Concepcion score 0, recent sed rate and CRP normal-pain driven by the left 1st toe and right shoulder  currently on orencia 125mg SQ every 14- 21 days   In the past failed methotrexate monotherapy, failed Humira, Enbrel and Xeljanz    Rheumatoid arthritis involving right shoulder with negative rheumatoid factor --predominant involving the medial rotator cuff area and short of the biceps insertion to the coracoid-may be related to overuse as opposed to underlying rheumatoid    Rheumatoid arthritis involving left foot with negative rheumatoid factor-  Acute left 1st MTP pain-likely related to degenerative arthritis.  Cannot exclude component of rheumatoid    Immunocompromised:  Vaccines up-to-date except for new shingles vaccine     Medication monitoring with no toxicity side effects from Orencia, chronic immunosuppression with no recurrent infections            Plan:       Inject the right shoulder-see procedure note    Inject the left 1st MTP-see procedure note    Ice pack for 10 min 2-3 x day to L first toe     ice pack for 15-20 minutes 3 times a day for 2-3 days to the right shoulder    Continue Orencia-125  mg subcu every 2 weeks    Reviewed x-rays from June 2019-no erosions in hands; degenerative changes with bunion changes at the 1st MTPs of the feet but no erosive changes    Advil for 2- 3 doses in next day and then p.r.n.    No heavy or weight lifting exercise with shoulder for next 3 days    Avoid prolonged standing and walking for the next several days.  Make sure she wears good shoes avoids going barefoot or using flats    Keep appt in aug, discuss the new shingles vaccine at that time    C/w otc supplements    Prn use of  meloxicam 7.5 mg at night     Prolonged visit: Over 35 min spent in patient care and evaluation. Over  25 min of time used in reviewing prior labs, prior x-rays and current foot and shoulder symptoms, discussing diagnostic possibilities and role that overuse with exercise is likely causing, counseling on medication and injection options, explaining side effects of therapies, and coordination of care with  Family Dr   Patient's questions were all addressed and she understands our plans and risks.

## 2019-07-22 NOTE — PROCEDURES
Small Joint Aspiration/Injection: R great MTP  Date/Time: 7/22/2019 4:34 PM  Performed by: Osmar Childers MD  Authorized by: Osmar Childers MD     Consent Done?:  Yes (Verbal)  Indications:  Pain and joint swelling  Site marked: The procedure site was marked    Timeout: Prior to procedure the correct patient, procedure, and site was verified      Location:  Great toe  Site:  R great MTP  Prep: Patient was prepped and draped in usual sterile fashion    Ultrasonic Guidance for needle placement: No  Needle size:  25 G  Approach:  Dorsal  Medications:  1.5 mg betamethasone acetate-betamethasone sodium phosphate 6 mg/mL; 40 mg methylPREDNISolone acetate 80 mg/mL  Patient tolerance:  Patient tolerated the procedure well with no immediate complications     Additional Comments: Procedure note: After verbal consent was obtained. The L first MTP joint  was prepared with sterile prep.  The skin was anesthetized with 1% ethyl chloride.  The L first MTP joint  was then injected with 1/8 mL celestone/soluspan 30 mg/5 mL, 1/8 mL Depo-Medrol 80 mg/mL and 1/8 mL of 0 1% lidocaine.  Hemostasis was obtained.  The patient tolerated procedure well with no complications.  Discharge and  icing instructions given to hair

## 2019-07-22 NOTE — PATIENT INSTRUCTIONS
Ice injected areas for next 3 days    Cut back on weight lifting for R arm    Good shoes, no barefoot or prolonged flats    No change other meds    See you in August with lab 1 week pv

## 2019-07-22 NOTE — PROCEDURES
Large Joint Aspiration/Injection: R subacromial bursa  Date/Time: 7/22/2019 4:29 PM  Performed by: Osmar Childers MD  Authorized by: Osmar Childers MD     Consent Done?:  Yes (Verbal)  Indications:  Pain and joint swelling  Procedure site marked: Yes    Timeout: Prior to procedure the correct patient, procedure, and site was verified      Location:  Shoulder  Site:  R subacromial bursa  Prep: Patient was prepped and draped in usual sterile fashion    Ultrasonic Guidance for needle placement: No  Needle size:  25 G  Approach:  Anteromedial  Medications:  1.5 mg betamethasone acetate-betamethasone sodium phosphate 6 mg/mL; 40 mg methylPREDNISolone acetate 40 mg/mL  Patient tolerance:  Patient tolerated the procedure well with no immediate complications    Additional Comments: Procedure note: After verbal consent was obtained.  The  Rshoulder was prepared with sterile prep.  The skin was anesthetized with 1% ethyl chloride.  The shoulder short head of the biceps was I sprayed with topical and then injected with0.25 mL celestone/soluspan 6 mg/5 mL, 0.25 mL Depo-Medrol 80 mg/mL and1/2 mL of 1 5 lidocaine.  Hemostasis was obtained.  The patient tolerated procedure well with no complications.  Patient diischarged with icing instructions

## 2019-07-25 ENCOUNTER — PATIENT MESSAGE (OUTPATIENT)
Dept: RHEUMATOLOGY | Facility: CLINIC | Age: 49
End: 2019-07-25

## 2019-08-04 ENCOUNTER — PATIENT MESSAGE (OUTPATIENT)
Dept: RHEUMATOLOGY | Facility: CLINIC | Age: 49
End: 2019-08-04

## 2019-08-05 NOTE — TELEPHONE ENCOUNTER
Niurka trinity is booked for a sooner appointment and was not sure if it was okay to overbook her.

## 2019-08-12 ENCOUNTER — TELEPHONE (OUTPATIENT)
Dept: RHEUMATOLOGY | Facility: CLINIC | Age: 49
End: 2019-08-12

## 2019-08-12 ENCOUNTER — PATIENT MESSAGE (OUTPATIENT)
Dept: RHEUMATOLOGY | Facility: CLINIC | Age: 49
End: 2019-08-12

## 2019-08-12 ENCOUNTER — PROCEDURE VISIT (OUTPATIENT)
Dept: RHEUMATOLOGY | Facility: CLINIC | Age: 49
End: 2019-08-12
Payer: COMMERCIAL

## 2019-08-12 DIAGNOSIS — M75.52 SUBACROMIAL BURSITIS OF LEFT SHOULDER JOINT: Primary | ICD-10-CM

## 2019-08-12 PROCEDURE — 20610 LARGE JOINT ASPIRATION/INJECTION: L SUBACROMIAL BURSA: ICD-10-PCS | Mod: LT,S$GLB,, | Performed by: PHYSICIAN ASSISTANT

## 2019-08-12 PROCEDURE — 20610 DRAIN/INJ JOINT/BURSA W/O US: CPT | Mod: LT,S$GLB,, | Performed by: PHYSICIAN ASSISTANT

## 2019-08-12 RX ORDER — HYDROCODONE BITARTRATE AND ACETAMINOPHEN 7.5; 325 MG/1; MG/1
1 TABLET ORAL EVERY 6 HOURS PRN
Qty: 30 TABLET | Refills: 0 | Status: SHIPPED | OUTPATIENT
Start: 2019-08-12 | End: 2020-06-10 | Stop reason: SDUPTHER

## 2019-08-12 RX ORDER — TRIAMCINOLONE ACETONIDE 40 MG/ML
40 INJECTION, SUSPENSION INTRA-ARTICULAR; INTRAMUSCULAR
Status: DISCONTINUED | OUTPATIENT
Start: 2019-08-12 | End: 2019-08-12 | Stop reason: HOSPADM

## 2019-08-12 RX ORDER — METHYLPREDNISOLONE 4 MG/1
TABLET ORAL
Qty: 1 PACKAGE | Refills: 0 | Status: SHIPPED | OUTPATIENT
Start: 2019-08-12 | End: 2019-09-02

## 2019-08-12 RX ADMIN — TRIAMCINOLONE ACETONIDE 40 MG: 40 INJECTION, SUSPENSION INTRA-ARTICULAR; INTRAMUSCULAR at 12:08

## 2019-08-12 NOTE — PROCEDURES
Large Joint Aspiration/Injection: L subacromial bursa  Date/Time: 8/12/2019 12:43 PM  Performed by: Ramila Lund PA-C  Authorized by: Ramila Lund PA-C     Consent Done?:  Yes (Verbal)  Indications:  Pain  Procedure site marked: Yes    Timeout: Prior to procedure the correct patient, procedure, and site was verified    Anesthesia    Anesthetic: lidocaine 2% without epinephrine    Location:  Shoulder  Site:  L subacromial bursa  Medications:  40 mg triamcinolone acetonide 40 mg/mL  Patient tolerance:  Patient tolerated the procedure well with no immediate complications    Additional Comments: Procedure note: After verbal consent was obtained.  The left shoulder was prepared with sterile prep.  The skin was anesthetized with 1% ethyl chloride.  The shoulder joint was injected with 2.5 cc of 1% lidocaine to anesthetize the joint.  The shoulder joint was then injected with 40mg kenalog  and 1.0 mL of 1% lidocaine.  Hemostasis was obtained.  The patient tolerated procedure well with no complications.  Patient discharged with icing instructions

## 2019-08-12 NOTE — TELEPHONE ENCOUNTER
----- Message from Tahmina Kenney sent at 8/12/2019  8:24 AM CDT -----  Contact: Patient  Patient is having a flair up in her left shoulder and cannot move please call to work in today if possible.  [phone].

## 2019-08-12 NOTE — TELEPHONE ENCOUNTER
Pt states her left shoulder is in pain. Pain started Saturday, has gotten worse on Sunday and today cannot move shoulder at all. PT would like a steroid injection from Ramila or Niurka. Was able to squeeze in pt with Ramila for 12pm . PT states understanding that she is coming in only for a procedure visit.

## 2019-08-19 ENCOUNTER — PATIENT MESSAGE (OUTPATIENT)
Dept: RHEUMATOLOGY | Facility: CLINIC | Age: 49
End: 2019-08-19

## 2019-08-19 ENCOUNTER — TELEPHONE (OUTPATIENT)
Dept: RHEUMATOLOGY | Facility: CLINIC | Age: 49
End: 2019-08-19

## 2019-08-19 ENCOUNTER — LAB VISIT (OUTPATIENT)
Dept: LAB | Facility: HOSPITAL | Age: 49
End: 2019-08-19
Attending: PHYSICIAN ASSISTANT
Payer: COMMERCIAL

## 2019-08-19 DIAGNOSIS — M05.79 RHEUMATOID ARTHRITIS INVOLVING MULTIPLE SITES WITH POSITIVE RHEUMATOID FACTOR: Chronic | ICD-10-CM

## 2019-08-19 DIAGNOSIS — Z51.81 MEDICATION MONITORING ENCOUNTER: Chronic | ICD-10-CM

## 2019-08-19 DIAGNOSIS — M05.79 RHEUMATOID ARTHRITIS INVOLVING MULTIPLE SITES WITH POSITIVE RHEUMATOID FACTOR: Primary | Chronic | ICD-10-CM

## 2019-08-19 LAB — CCP AB SER IA-ACNC: 270.5 U/ML

## 2019-08-19 PROCEDURE — 86200 CCP ANTIBODY: CPT

## 2019-08-19 PROCEDURE — 36415 COLL VENOUS BLD VENIPUNCTURE: CPT

## 2019-08-19 NOTE — TELEPHONE ENCOUNTER
Dr. Childers-    MsBreanna Johns sent a message that she came to do labs today and they only did a CCP.  She wanted to make sure that no other labs were supposed to be completed before her appt with Niurka on 8/21/19.    Please let me know if you would like to have other labs run and I will link them to her appt.

## 2019-08-20 NOTE — TELEPHONE ENCOUNTER
Scheduled additional labs per Dr. Childers's orders.  Spoke to patient and asked that she arrive early for her appt and have labs drawn.  She agreed.

## 2019-08-21 ENCOUNTER — OFFICE VISIT (OUTPATIENT)
Dept: RHEUMATOLOGY | Facility: CLINIC | Age: 49
End: 2019-08-21
Payer: COMMERCIAL

## 2019-08-21 ENCOUNTER — HOSPITAL ENCOUNTER (OUTPATIENT)
Dept: RADIOLOGY | Facility: HOSPITAL | Age: 49
Discharge: HOME | End: 2019-08-21
Attending: PHYSICIAN ASSISTANT
Payer: COMMERCIAL

## 2019-08-21 VITALS
WEIGHT: 141.56 LBS | BODY MASS INDEX: 22.22 KG/M2 | DIASTOLIC BLOOD PRESSURE: 64 MMHG | HEART RATE: 78 BPM | HEIGHT: 67 IN | SYSTOLIC BLOOD PRESSURE: 104 MMHG

## 2019-08-21 DIAGNOSIS — M54.2 NECK PAIN: ICD-10-CM

## 2019-08-21 DIAGNOSIS — M05.79 RHEUMATOID ARTHRITIS INVOLVING MULTIPLE SITES WITH POSITIVE RHEUMATOID FACTOR: ICD-10-CM

## 2019-08-21 DIAGNOSIS — M62.838 CERVICAL PARASPINOUS MUSCLE SPASM: ICD-10-CM

## 2019-08-21 DIAGNOSIS — M54.2 NECK PAIN: Chronic | ICD-10-CM

## 2019-08-21 DIAGNOSIS — M75.52 SUBACROMIAL BURSITIS OF LEFT SHOULDER JOINT: ICD-10-CM

## 2019-08-21 DIAGNOSIS — Z51.81 MEDICATION MONITORING ENCOUNTER: Chronic | ICD-10-CM

## 2019-08-21 DIAGNOSIS — M05.742 RHEUMATOID ARTHRITIS INVOLVING LEFT HAND WITH POSITIVE RHEUMATOID FACTOR: ICD-10-CM

## 2019-08-21 DIAGNOSIS — M05.79 RHEUMATOID ARTHRITIS INVOLVING MULTIPLE SITES WITH POSITIVE RHEUMATOID FACTOR: Primary | Chronic | ICD-10-CM

## 2019-08-21 DIAGNOSIS — D84.9 IMMUNOCOMPROMISED: ICD-10-CM

## 2019-08-21 PROCEDURE — 72052 X-RAY EXAM NECK SPINE 6/>VWS: CPT | Mod: 26,,, | Performed by: RADIOLOGY

## 2019-08-21 PROCEDURE — 99999 PR PBB SHADOW E&M-EST. PATIENT-LVL IV: CPT | Mod: PBBFAC,,, | Performed by: PHYSICIAN ASSISTANT

## 2019-08-21 PROCEDURE — 99214 OFFICE O/P EST MOD 30 MIN: CPT | Mod: S$GLB,,, | Performed by: PHYSICIAN ASSISTANT

## 2019-08-21 PROCEDURE — 72052 X-RAY EXAM NECK SPINE 6/>VWS: CPT | Mod: TC

## 2019-08-21 PROCEDURE — 3008F BODY MASS INDEX DOCD: CPT | Mod: CPTII,S$GLB,, | Performed by: PHYSICIAN ASSISTANT

## 2019-08-21 PROCEDURE — 99214 PR OFFICE/OUTPT VISIT, EST, LEVL IV, 30-39 MIN: ICD-10-PCS | Mod: S$GLB,,, | Performed by: PHYSICIAN ASSISTANT

## 2019-08-21 PROCEDURE — 99999 PR PBB SHADOW E&M-EST. PATIENT-LVL IV: ICD-10-PCS | Mod: PBBFAC,,, | Performed by: PHYSICIAN ASSISTANT

## 2019-08-21 PROCEDURE — 3008F PR BODY MASS INDEX (BMI) DOCUMENTED: ICD-10-PCS | Mod: CPTII,S$GLB,, | Performed by: PHYSICIAN ASSISTANT

## 2019-08-21 PROCEDURE — 72052 XR CERVICAL SPINE 5 VIEW WITH FLEX AND EXT: ICD-10-PCS | Mod: 26,,, | Performed by: RADIOLOGY

## 2019-08-21 RX ORDER — HYDROCORTISONE 25 MG/G
CREAM TOPICAL
COMMUNITY
Start: 2019-06-27 | End: 2021-10-08

## 2019-08-21 RX ORDER — TIZANIDINE 4 MG/1
4 TABLET ORAL 3 TIMES DAILY PRN
Qty: 30 TABLET | Refills: 1 | Status: SHIPPED | OUTPATIENT
Start: 2019-08-21 | End: 2019-08-31

## 2019-08-21 RX ORDER — TRETINOIN 0.25 MG/G
CREAM TOPICAL
Refills: 4 | COMMUNITY
Start: 2019-06-28 | End: 2020-12-02

## 2019-08-21 ASSESSMENT — CLINICAL DISEASE ACTIVITY INDEX (CDAI)
PATIENT_ASSESSMENT: 1
SWOLLEN_JOINTS_COUNT: 1
PHYSICIAN_ASSESSMENT: 1
TOTAL_SCORE: 4
TENDER_JOINTS_COUNT: 1

## 2019-08-21 ASSESSMENT — ROUTINE ASSESSMENT OF PATIENT INDEX DATA (RAPID3): MDHAQ FUNCTION SCORE: .5

## 2019-08-21 NOTE — PROGRESS NOTES
Subjective:       Patient ID: Elizabeth Johns is a 49 y.o. female.    Chief Complaint: Disease Management    Elizabeth is here today Rheum follow up.      She has seropositive (+ccp) nonerosive rheumatoid arthritis. She has been on orencia for several years. Her Orencia injection  Out every once a month.   Had been doing very very well. The past 2-3 weeks issues with her R.shoulder. Dr solis injected biceps tendon but pain persisted.  Here 2 days ago and Ramila GONSALVES injected left shoulder joint/bursa.   Neck pain - stiffness and soreness, difficulty turning head laterally, pain in her right shoulder but no pain radiating down her arms.     Her ccp ab was rechecked and has increased. This concerns her.     She is not on oral dmard meds. she is taking cbd oil daily, turmeric, iron red krill, boswelia, juice plus   thinks it has helped keeping her healthy and RA quiet today rates   Pain 0/10.     Failed mtx monotherapy in the past. Off mtx due to side effects (fatigue and nausea). She is not on prednisone. In the past failed humira, enbrel and xeljanz these just did not help  Uses pennsaid topical bid prn. High titers CCP positive but sedimentation rate and CRP have been normal.  Rheumatoid factor negative.                   She reports no joint swelling. Pertinent negatives include no dysuria, fatigue, fever, trouble swallowing, myalgias or headaches.         Review of Systems   Constitutional: Negative.  Negative for activity change, appetite change, chills, fatigue and fever.   HENT: Negative.  Negative for mouth sores and trouble swallowing.         No dry mouth   Eyes: Negative.  Negative for photophobia, pain and redness.        No swollen or red eyes, no dry eye     Respiratory: Negative.  Negative for chest tightness, shortness of breath, wheezing and stridor.    Cardiovascular: Negative.  Negative for chest pain.   Gastrointestinal: Negative.  Negative for abdominal pain, blood in stool, diarrhea, nausea  "and vomiting.   Genitourinary: Negative.  Negative for dysuria, frequency, hematuria and urgency.   Musculoskeletal: Positive for arthralgias. Negative for back pain, gait problem, joint swelling, myalgias, neck pain and neck stiffness.   Skin: Negative.  Negative for color change, pallor and rash.   Neurological: Negative.  Negative for weakness and headaches.   Hematological: Negative for adenopathy.   Psychiatric/Behavioral: Negative for suicidal ideas.         Objective:     Past Medical History:   Diagnosis Date    Arthritis     Rheumatoid        Immunization History   Administered Date(s) Administered    DTaP 06/26/2009    Influenza - High Dose - PF (65 years and older) 11/09/2016, 11/14/2017, 11/21/2018    Influenza - Quadrivalent 11/05/2014, 10/19/2015    Influenza Split 10/10/2013    Pneumococcal Conjugate - 13 Valent 12/19/2014    Pneumococcal Polysaccharide - 23 Valent 12/02/2015    Tdap 07/21/2016    Zoster 10/10/2013       /64   Pulse 78   Ht 5' 7" (1.702 m)   Wt 64.2 kg (141 lb 8.6 oz)   BMI 22.17 kg/m²      Physical Exam   Constitutional: She is oriented to person, place, and time and well-developed, well-nourished, and in no distress. No distress.   HENT:   Head: Normocephalic and atraumatic.   Right Ear: External ear normal.   Left Ear: External ear normal.   Mouth/Throat: No oropharyngeal exudate.   Eyes: Conjunctivae and EOM are normal. Pupils are equal, round, and reactive to light. No scleral icterus.   Neck: Normal range of motion. Neck supple. No thyromegaly present.   Cardiovascular: Normal rate, regular rhythm and normal heart sounds.    No murmur heard.  Pulmonary/Chest: Effort normal and breath sounds normal. She exhibits no tenderness.   Abdominal: Soft. Bowel sounds are normal.       Right Side Rheumatological Exam     The patient is tender to palpation of the shoulder      Lymphadenopathy:     She has no cervical adenopathy.   Neurological: She is alert and oriented " to person, place, and time. She displays normal reflexes. No cranial nerve deficit. She exhibits normal muscle tone. Gait normal.   Skin: Skin is warm and dry. No rash noted.     Musculoskeletal: Normal range of motion. She exhibits no edema.   Limited right lateral rotation about 75° with pain  Left lateral rotation about 85 to green minimal pain  Normal extension and flexion  Right upper neck tenderness to palpation over the paraspinous muscles, levator scapula and trapezius muscle  Normal shoulder range of motion bilaterally some mild tenderness to palpation right biceps tendon some mild crepitus in the right shoulder  No synovitis On exam today   no muscle weakness  Strength 5/5 to all measures flexors and extensors  Reflexes are normal, no hyperreflexia or clonus noted                Recent Results (from the past 336 hour(s))   Cyclic citrul peptide antibody, IgG    Collection Time: 08/19/19  3:39 PM   Result Value Ref Range    CCP Antibodies 270.5 (H) <5.0 U/mL     Lab Results   Component Value Date    HEPBCAB Negative 04/16/2019    HEPCAB Negative 10/10/2013       No results found for: TBGOLDPLUS       Ref. Range 7/18/2016 14:25 4/4/2017 17:25 2/21/2018 14:15 9/25/2018 07:35 8/19/2019 15:39   CCP Antibodies Latest Ref Range: <5.0 U/mL 107.4 (H) 163.6 (H) 125.7 (H) 115.2 (H) 270.5 (H)          Ref. Range 4/2/2014 12:00 4/15/2014 13:30   NIL Latest Units: IU/mL 0.02 0.03   Quantiferon Gold TB Latest Ref Range: Negative  Indeterminate (A) Negative   TB-Nil Latest Units: IU/mL <0.00 <0.00       X-Ray Hand 3 View Bilateral  Narrative: EXAMINATION:  XR HAND COMPLETE 3 VIEWS BILATERAL    CLINICAL HISTORY:  . Rheumatoid arthritis with rheumatoid factor of multiple sites without organ or systems involvement    TECHNIQUE:  PA, lateral, and oblique views of both hands were performed.    COMPARISON:  12/07/2017    FINDINGS:  No acute abnormality or significant change with minimal degenerative findings.  No concerning  erosions.  Impression: As above    Electronically signed by: Satya Bautista MD  Date:    06/11/2019  Time:    16:44  X-Ray Foot Complete Bilateral  Narrative: EXAMINATION:  XR FOOT COMPLETE 3 VIEW BILATERAL    CLINICAL HISTORY:  Rheumatoid arthritis with rheumatoid factor of multiple sites without organ or systems involvement    TECHNIQUE:  AP, lateral, and oblique views of both feet were performed.    COMPARISON:  12/07/2017    FINDINGS:  No acute osseous abnormality with similar 1st MTP joint degenerative changes bilaterally.  No concerning erosions.  Impression: As above    Electronically signed by: Satya Bautsita MD  Date:    06/11/2019  Time:    16:44               Assessment:       1. Rheumatoid arthritis involving multiple sites with positive rheumatoid factor    2. Rheumatoid arthritis involving left hand with positive rheumatoid factor    3. Subacromial bursitis of left shoulder joint    4. Medication monitoring encounter    5. Immunocompromised    6. Neck pain    7. Cervical paraspinous muscle spasm          Impression:  Seropositive rheumatoid arthritis -currently on orencia 125mg SQ every 21 days     CDAI 4, BHASKAR 0.5    oral methotrexate because of side effects  In the past failed methotrexate monotherapy, failed Humira, Enbrel and Xeljanz    Immunocompromised:  Vaccines up-to-date      Medication monitoring with no toxicity side effects, chronic immunosuppression with no recurrent infections        Neck pain - muscle spasm - djd/facet arthritis vs RA        Plan:         For now will hold off with changing orencia frequency  certainly with ccp increased could argue to increase  Feel more OA-myofascial issues currently with neck and not true RA exacerbation   Maybe Q 14-21 days instead of Q 28 and follow ccp ab titer       c-spine 5 view with lat/flex  Send to PT with dry needling, ischemia compression, myofascial release  Education on good Posture and ergonomics   Tens and heat for at home    arnicare gel can get from amazon- Made from arnica     Consider mri if not better    C/w otc supplements    Prn use of  meloxicam 7.5 mg at night with food      X-ray hand and feet every 1-2 yrs     Topical Voltaren gel  4 g qid prn   rtc 3-4 months with labs reg 4

## 2019-08-21 NOTE — PATIENT INSTRUCTIONS
Listed Below are some links to websites with Neck exercises and at home therapy programs you can do       [email or web address]    [email or web address]    [email or web address]    [email or web address]      Can get TENS unit     arnicare gel can get from amazon  Made from arnica

## 2019-08-22 ENCOUNTER — TELEPHONE (OUTPATIENT)
Dept: RHEUMATOLOGY | Facility: CLINIC | Age: 49
End: 2019-08-22

## 2019-08-22 DIAGNOSIS — R93.7 ABNORMAL X-RAY OF CERVICAL SPINE: ICD-10-CM

## 2019-08-22 DIAGNOSIS — M53.2X2 CERVICAL SPINE INSTABILITY: ICD-10-CM

## 2019-08-22 DIAGNOSIS — M54.2 NECK PAIN: ICD-10-CM

## 2019-08-22 DIAGNOSIS — M43.10 ANTEROLISTHESIS: Primary | ICD-10-CM

## 2019-08-22 NOTE — TELEPHONE ENCOUNTER
Radiology has already scheduled pt and contacted her for her MRI . MRI scheduled for tomorrow at 7AM

## 2019-08-22 NOTE — TELEPHONE ENCOUNTER
Spoke to patient and informed her that an appointment for MRI Cervical Spine is scheduled for tomorrow (August 22, 2019) at 7:15 am but she should arrive at 7:00 am.

## 2019-08-22 NOTE — TELEPHONE ENCOUNTER
Called pt c-spine x-ray concerning for cervical instability  No PT or chiropractor manipulation until we know what we are dealing with       Need mri C-spine asap    Please call radiology to see if they have openings in the next few days and call pt

## 2019-08-23 ENCOUNTER — HOSPITAL ENCOUNTER (OUTPATIENT)
Dept: RADIOLOGY | Facility: HOSPITAL | Age: 49
Discharge: HOME | End: 2019-08-23
Attending: PHYSICIAN ASSISTANT
Payer: COMMERCIAL

## 2019-08-23 DIAGNOSIS — M43.10 ANTEROLISTHESIS: ICD-10-CM

## 2019-08-23 DIAGNOSIS — M53.2X2 CERVICAL SPINE INSTABILITY: ICD-10-CM

## 2019-08-23 DIAGNOSIS — M54.2 NECK PAIN: ICD-10-CM

## 2019-08-23 DIAGNOSIS — R93.7 ABNORMAL X-RAY OF CERVICAL SPINE: ICD-10-CM

## 2019-08-23 PROCEDURE — 72141 MRI NECK SPINE W/O DYE: CPT | Mod: 26,,, | Performed by: RADIOLOGY

## 2019-08-23 PROCEDURE — 72141 MRI CERVICAL SPINE WITHOUT CONTRAST: ICD-10-PCS | Mod: 26,,, | Performed by: RADIOLOGY

## 2019-08-23 PROCEDURE — 72141 MRI NECK SPINE W/O DYE: CPT | Mod: TC

## 2019-08-26 ENCOUNTER — TELEPHONE (OUTPATIENT)
Dept: RHEUMATOLOGY | Facility: CLINIC | Age: 49
End: 2019-08-26

## 2019-08-26 DIAGNOSIS — M54.2 NECK PAIN: ICD-10-CM

## 2019-08-26 DIAGNOSIS — M62.838 CERVICAL PARASPINOUS MUSCLE SPASM: ICD-10-CM

## 2019-08-26 DIAGNOSIS — M43.10 ANTEROLISTHESIS: Primary | ICD-10-CM

## 2019-08-26 DIAGNOSIS — M53.2X2 CERVICAL SPINE INSTABILITY: ICD-10-CM

## 2019-08-26 DIAGNOSIS — R93.7 ABNORMAL X-RAY OF CERVICAL SPINE: ICD-10-CM

## 2019-08-26 DIAGNOSIS — M05.79 RHEUMATOID ARTHRITIS INVOLVING MULTIPLE SITES WITH POSITIVE RHEUMATOID FACTOR: Chronic | ICD-10-CM

## 2019-08-26 NOTE — TELEPHONE ENCOUNTER
Seropositive high titer RA pt on sub Q Orencia   RA fairly well controlled    1 yr onset of neck pain which she has been seeing chiropractor on her own    Seen last week with more neck pain radiating down to right shoulder with  Diffuse muscle spasm      5 view C-spine noted below   The vertebral bodies demonstrate a normal height.  There is a couple mm of anterolisthesis of C3 on C4 and C4 on C5.  There is decreased listhesis on the extension views at the C3-4 and C4-5 levels suggesting motion/instability.  No widening of the predental space.  There is osseous encroachment noted upon the C3-4 neural foraminal canal on the right secondary to uncovertebral joint hypertrophy and facet arthropathy    Concern-with possible instability    MRI done   C3-C4: There is mild uncovertebral and facet arthropathy with mild bilateral neural foraminal narrowing which appears greater on the left.  C4-C5: There is uncovertebral hypertrophy which is greater on the left.  No significant spinal canal or neural foraminal stenosis.  C5-C6: A small left perineural cyst is again noted, unchanged.  No spinal canal stenosis.  Right neural foramen appears normal.  C6-C7: Facet arthropathy.  Mild uncovertebral hypertrophy.  Bilateral perineural cysts are again seen, unchanged.  Neural foramina otherwise appear normal.  Mild uncovertebral and facet arthropathy.  Spinal canal is maintained.  C7-T1: Small right perineural cyst again noted.  Neural foramina otherwise appears normal.  Mild facet arthropathy.  No spinal canal stenosis.        Called pt and discussed mri findings  For now hold off on  PT and absolutely no chiropractor again    Will refer to Dr Berry Rhodes in our NS dept   For evaluation and monitoring    For now c/w otc nsaids and zanaflex

## 2019-08-26 NOTE — TELEPHONE ENCOUNTER
Spoke with patient, she is currently scheduled for an appt for evaluation as referred by EVELYN Rubio

## 2019-08-28 ENCOUNTER — OFFICE VISIT (OUTPATIENT)
Dept: NEUROSURGERY | Facility: CLINIC | Age: 49
End: 2019-08-28
Payer: COMMERCIAL

## 2019-08-28 ENCOUNTER — PATIENT MESSAGE (OUTPATIENT)
Dept: NEUROSURGERY | Facility: CLINIC | Age: 49
End: 2019-08-28

## 2019-08-28 VITALS
BODY MASS INDEX: 22.39 KG/M2 | SYSTOLIC BLOOD PRESSURE: 108 MMHG | HEIGHT: 67 IN | DIASTOLIC BLOOD PRESSURE: 74 MMHG | RESPIRATION RATE: 18 BRPM | WEIGHT: 142.63 LBS | HEART RATE: 61 BPM

## 2019-08-28 DIAGNOSIS — M25.512 CHRONIC PAIN OF BOTH SHOULDERS: ICD-10-CM

## 2019-08-28 DIAGNOSIS — M50.30 DEGENERATIVE DISC DISEASE, CERVICAL: Primary | ICD-10-CM

## 2019-08-28 DIAGNOSIS — M05.79 RHEUMATOID ARTHRITIS INVOLVING MULTIPLE SITES WITH POSITIVE RHEUMATOID FACTOR: Chronic | ICD-10-CM

## 2019-08-28 DIAGNOSIS — M25.511 CHRONIC PAIN OF BOTH SHOULDERS: ICD-10-CM

## 2019-08-28 DIAGNOSIS — M43.12 SPONDYLOLISTHESIS OF CERVICAL REGION: ICD-10-CM

## 2019-08-28 DIAGNOSIS — M62.838 MUSCLE SPASMS OF NECK: ICD-10-CM

## 2019-08-28 DIAGNOSIS — M54.2 NECK PAIN, BILATERAL POSTERIOR: ICD-10-CM

## 2019-08-28 DIAGNOSIS — G89.29 CHRONIC PAIN OF BOTH SHOULDERS: ICD-10-CM

## 2019-08-28 PROCEDURE — 99205 OFFICE O/P NEW HI 60 MIN: CPT | Mod: S$GLB,,, | Performed by: NEUROLOGICAL SURGERY

## 2019-08-28 PROCEDURE — 3008F BODY MASS INDEX DOCD: CPT | Mod: CPTII,S$GLB,, | Performed by: NEUROLOGICAL SURGERY

## 2019-08-28 PROCEDURE — 99999 PR PBB SHADOW E&M-EST. PATIENT-LVL III: CPT | Mod: PBBFAC,,, | Performed by: NEUROLOGICAL SURGERY

## 2019-08-28 PROCEDURE — 99999 PR PBB SHADOW E&M-EST. PATIENT-LVL III: ICD-10-PCS | Mod: PBBFAC,,, | Performed by: NEUROLOGICAL SURGERY

## 2019-08-28 PROCEDURE — 3008F PR BODY MASS INDEX (BMI) DOCUMENTED: ICD-10-PCS | Mod: CPTII,S$GLB,, | Performed by: NEUROLOGICAL SURGERY

## 2019-08-28 PROCEDURE — 99205 PR OFFICE/OUTPT VISIT, NEW, LEVL V, 60-74 MIN: ICD-10-PCS | Mod: S$GLB,,, | Performed by: NEUROLOGICAL SURGERY

## 2019-08-28 RX ORDER — DIAZEPAM 5 MG/1
5 TABLET ORAL EVERY 6 HOURS PRN
Qty: 30 TABLET | Refills: 0 | Status: SHIPPED | OUTPATIENT
Start: 2019-08-28 | End: 2019-08-29 | Stop reason: SDUPTHER

## 2019-08-28 NOTE — PROGRESS NOTES
"Subjective:      Patient ID: Elizabeth Johns is a 49 y.o. female.    Chief Complaint: Neck Pain (Cervicalgia )    Patient is here today as a new patient for evaluation of posterior neck pain she is here today with MRI an x-ray of the cervical spine    49-year-old female with a 1-1/2 year history of posterior neck pain going into her shoulders bilaterally.  Initially the pain was primarily located in the shoulders  She had an injection in the right shoulder for suspected tendinitis which did not offer any relief.  More recently she began experiencing left shoulder pain and had an injection on the left shoulder which greatly helped her symptoms.  The pain is daily.  Worse with activity and better with rest.  She has been the therapy before for this and has had dry needling which helped somewhat however the symptoms persist  She had a nerve conduction study/EMG done at [facility] last year which showed mild carpal tunnel syndrome bilaterally  She does have a diagnosis of RA  For symptom relief she had a short course of Norco which has been discontinued  Zanaflex as not offered any sustained relief  She will occasionally have symptoms into the hands  She states that occasionally her left 2nd finger will " become stuck" in particular in the morning.  The finger will release after manually moving it.  No weakness or bowel bladder symptoms  She has never had pain management evaluation for injections     Her MRI showed some mild degenerative changes without any severe stenosis noted  Her x-ray of the cervical spine showed a mild spondylolisthesis with reduction on movement      Review of Systems   Constitutional: Negative for activity change, appetite change and chills.   HENT: Negative for congestion and ear pain.    Eyes: Negative for photophobia, redness and visual disturbance.   Respiratory: Negative for apnea and chest tightness.    Cardiovascular: Negative for chest pain.   Gastrointestinal: Negative for abdominal " distention and abdominal pain.   Endocrine: Negative for cold intolerance.   Genitourinary: Negative for difficulty urinating.   Musculoskeletal: Positive for arthralgias, joint swelling, myalgias, neck pain and neck stiffness.   Skin: Negative for color change.   Allergic/Immunologic: Negative for environmental allergies.   Neurological: Positive for weakness. Negative for dizziness and numbness.   Hematological: Negative for adenopathy. Does not bruise/bleed easily.   Psychiatric/Behavioral: Negative for agitation, behavioral problems and confusion.     Objective:     Nursing note and vitals reviewed  Gen:Oriented to person, place, and time.             Appears stated age   Head:Normocephalic and atraumatic.  Nose: Nose normal.    Eyes: EOM are normal. Pupils are equal, round, and reactive to light.   Neck: Neck supple. No masses or lesions palpated  Cardiovascular: Intact distal pulses.    Abdominal: Soft.   Neurological: Alert and oriented to person, place, and time.  No cranial nerve deficit.  Coordination normal. Normal muscle tone  Psychiatric: Normal mood and affect. Behavior is normal.    Neck:   Tenderness bilaterally Paraspinal tenderness   None  Paraspinal muscle spasms    More pain associated with lateral movement Pain with flexion and extention    Guarded range of motion Range of motion    Neg  Spurling's sign     Motor   Right Right Left Left  Level Group   5  5  C5 Deltoid   5  5  C6 Bicep   5  5   Wrist extension    5  5  C7 Triceps   5  5   Wrist flexion   5  5  C8    5  5  T1 Interossei    Sensation  NL Decreased (R/L/BL) Level Sensation    X  C5 Lateral upper arm   X  C6 Thumb and index finger, lat forearm   X  C7 Middle finger   X  C8 Ring and little finger   X  T1 Medial arm      Reflex  2+  Bicep tendon   2+  Brachioradialis   2+  Triceps tendon   Not present  Saenz's   none  Clonus   neg  Tinel's      MRI cervical spine without contrast this was compared to MRI done in 2013  Mild  degenerative changes at several levels however no high-grade central stenosis or foraminal stenosis is identified  There are several small perineural cysts present bilaterally    XR Cervical   There is a couple mm of anterolisthesis of C3 on C4 and C4 on C5.  There is decreased listhesis on the extension views at the C3-4 and C4-5 levels suggesting motion/instability.  No widening of the predental space.  There is osseous encroachment noted upon the C3-4 neural foraminal canal on the right secondary to uncovertebral joint hypertrophy and facet arthropathy  Assessment:     1. Degenerative disc disease, cervical    2. Spondylolisthesis of cervical region    3. Neck pain, bilateral posterior    4. Chronic pain of both shoulders    5. Rheumatoid arthritis involving multiple sites with positive rheumatoid factor    6. Muscle spasms of neck      Plan:     Degenerative disc disease, cervical    Spondylolisthesis of cervical region    Neck pain, bilateral posterior    Chronic pain of both shoulders    Rheumatoid arthritis involving multiple sites with positive rheumatoid factor    Muscle spasms of neck    Patient has degenerative changes in the cervical spine without any high-grade stenosis  She does have spondylolisthesis at C3-4 and 4-5 with minimal movement on flexion and extension.  Her symptoms are mostly located centrally and posterior.    Given her persistent pain is could be related to the facet joint movement or related to her arthralgias with associated muscle spasms posteriorly.    Will refer to pain management for facet injections at C3-4 and 4-5 to see if this offers her any relief    She will be referred to physical therapy for continue range of motion exercises and traction    Will change her muscle spasm to a short course of Valium which she can take as needed for spasms at night    Will arrange for follow-up after testing and procedures are complete to see how she is doing to see if any other treatments are  warranted    In terms of her finger symptoms this could be due to possible contractures.  I do not think this would be related to her cervical spine.  I have offered a referral to orthopedic hand specialist.  At this time she would like to have her cervical spine worked up for proceeding with working up her hand symptoms.     Thank you for the referral   Please call with any questions    Berry Rhodes MD  Neurosurgery

## 2019-08-29 DIAGNOSIS — M47.812 SPONDYLOSIS OF CERVICAL JOINT WITHOUT MYELOPATHY: Primary | ICD-10-CM

## 2019-08-29 DIAGNOSIS — M50.30 DEGENERATIVE DISC DISEASE, CERVICAL: ICD-10-CM

## 2019-08-29 RX ORDER — DIAZEPAM 5 MG/1
5 TABLET ORAL EVERY 6 HOURS PRN
Qty: 30 TABLET | Refills: 0 | Status: SHIPPED | OUTPATIENT
Start: 2019-08-29 | End: 2020-03-09

## 2019-08-29 NOTE — TELEPHONE ENCOUNTER
Per MD Rhodes note - Will change her muscle spasm to a short course of Valium which she can take as needed for spasms at night

## 2019-09-03 ENCOUNTER — PATIENT MESSAGE (OUTPATIENT)
Dept: PAIN MEDICINE | Facility: CLINIC | Age: 49
End: 2019-09-03

## 2019-09-03 ENCOUNTER — PATIENT MESSAGE (OUTPATIENT)
Dept: RHEUMATOLOGY | Facility: CLINIC | Age: 49
End: 2019-09-03

## 2019-09-03 ENCOUNTER — TELEPHONE (OUTPATIENT)
Dept: PAIN MEDICINE | Facility: CLINIC | Age: 49
End: 2019-09-03

## 2019-09-03 NOTE — TELEPHONE ENCOUNTER
From: Cookie Eric MA   Sent: 8/29/2019   8:34 AM   To: Rajwinder Davis Staff, Sarai Suárez Staff     Per MD Rhodes request please contact patient (pain management) for facet injections at C3-4 and 4-5.       Thanks in advance,   Sandra      Contacted pt to schedule. Pt request to call back at a later time when she has calendar. All questions answered.//lp

## 2019-09-04 ENCOUNTER — PROCEDURE VISIT (OUTPATIENT)
Dept: RHEUMATOLOGY | Facility: CLINIC | Age: 49
End: 2019-09-04
Payer: COMMERCIAL

## 2019-09-04 DIAGNOSIS — M75.51 SUBACROMIAL BURSITIS OF RIGHT SHOULDER JOINT: Primary | ICD-10-CM

## 2019-09-04 PROCEDURE — 20610 DRAIN/INJ JOINT/BURSA W/O US: CPT | Mod: RT,S$GLB,, | Performed by: PHYSICIAN ASSISTANT

## 2019-09-04 PROCEDURE — 20610 LARGE JOINT ASPIRATION/INJECTION: R SUBACROMIAL BURSA: ICD-10-PCS | Mod: RT,S$GLB,, | Performed by: PHYSICIAN ASSISTANT

## 2019-09-04 RX ORDER — TRIAMCINOLONE ACETONIDE 40 MG/ML
40 INJECTION, SUSPENSION INTRA-ARTICULAR; INTRAMUSCULAR
Status: DISCONTINUED | OUTPATIENT
Start: 2019-09-04 | End: 2019-09-04 | Stop reason: HOSPADM

## 2019-09-04 RX ADMIN — TRIAMCINOLONE ACETONIDE 40 MG: 40 INJECTION, SUSPENSION INTRA-ARTICULAR; INTRAMUSCULAR at 02:09

## 2019-09-04 NOTE — PROCEDURES
Large Joint Aspiration/Injection: R subacromial bursa  Date/Time: 9/4/2019 2:12 PM  Performed by: Ramila Lund PA-C  Authorized by: Ramila Lund PA-C     Consent Done?:  Yes (Verbal)  Indications:  Pain  Procedure site marked: Yes    Timeout: Prior to procedure the correct patient, procedure, and site was verified    Anesthesia    Anesthesia: local infiltration  Anesthetic: lidocaine 1% without epinephrine    Location:  Shoulder  Site:  R subacromial bursa  Medications:  40 mg triamcinolone acetonide 40 mg/mL  Patient tolerance:  Patient tolerated the procedure well with no immediate complications    Additional Comments: Procedure note: After verbal consent was obtained.  The right shoulder was prepared with sterile prep.  The skin was anesthetized with 1% ethyl chloride.  The shoulder joint was injected with 2.5 cc of 1% lidocaine to anesthetize the joint.  The shoulder joint was then injected with 40mg kenalog  and 1.0 mL of 1% lidocaine.  Hemostasis was obtained.  The patient tolerated procedure well with no complications.  Patient discharged with icing instructions

## 2019-09-10 ENCOUNTER — TELEPHONE (OUTPATIENT)
Dept: PAIN MEDICINE | Facility: CLINIC | Age: 49
End: 2019-09-10

## 2019-09-10 ENCOUNTER — PATIENT MESSAGE (OUTPATIENT)
Dept: PAIN MEDICINE | Facility: CLINIC | Age: 49
End: 2019-09-10

## 2019-09-10 NOTE — TELEPHONE ENCOUNTER
Contacted patient to schedule procedure with Dr. Moon. Was informed by patient that procedure was scheduled by Ananya however she requested to change appointment. Appointment rescheduled to September 24th with Dr. Moon.  Appointment also rescheduled with Neuro.  Instructions sent via patient portal.  Patient verbalized all understanding.

## 2019-09-13 ENCOUNTER — TELEPHONE (OUTPATIENT)
Dept: PAIN MEDICINE | Facility: CLINIC | Age: 49
End: 2019-09-13

## 2019-09-13 NOTE — TELEPHONE ENCOUNTER
Contacted patient; informed patient that insurance has denied injection and is requesting peer to peer.  Informed patient that message was sent to Dr. Rhodes's staff so he can perform peer to peer to have insurance's decision overturned.  Patient verbalized understanding.

## 2019-09-17 ENCOUNTER — TELEPHONE (OUTPATIENT)
Dept: NEUROSURGERY | Facility: CLINIC | Age: 49
End: 2019-09-17

## 2019-09-17 ENCOUNTER — PATIENT MESSAGE (OUTPATIENT)
Dept: PAIN MEDICINE | Facility: CLINIC | Age: 49
End: 2019-09-17

## 2019-09-18 ENCOUNTER — PATIENT MESSAGE (OUTPATIENT)
Dept: NEUROSURGERY | Facility: CLINIC | Age: 49
End: 2019-09-18

## 2019-09-18 ENCOUNTER — TELEPHONE (OUTPATIENT)
Dept: NEUROSURGERY | Facility: CLINIC | Age: 49
End: 2019-09-18

## 2019-09-18 DIAGNOSIS — M79.2 RADICULAR PAIN IN RIGHT ARM: ICD-10-CM

## 2019-09-18 DIAGNOSIS — M79.2 RADICULAR PAIN IN LEFT ARM: Primary | ICD-10-CM

## 2019-09-18 NOTE — TELEPHONE ENCOUNTER
Cervical facet injections denied by insurance due to presence of radicular type symptoms that need to be addressed prior.     EMG and Referral to Physiatry placed to evaluate for cervical radiculopathy.     Patient to f/u after EMG.     BM

## 2019-09-18 NOTE — TELEPHONE ENCOUNTER
Sent patient a portal message to advise the below information.     ----- Message from Basil Salcedo PA-C sent at 9/18/2019  9:54 AM CDT -----  Please let this patient know she was not approved for her injections with pain management. Her plan would like us to rule out pinched nerves prior to approval. I will place an order for EMG and she can follow up with us.     Thank you!

## 2019-09-24 ENCOUNTER — OFFICE VISIT (OUTPATIENT)
Dept: PHYSICAL MEDICINE AND REHAB | Facility: CLINIC | Age: 49
End: 2019-09-24
Payer: COMMERCIAL

## 2019-09-24 VITALS
BODY MASS INDEX: 22.39 KG/M2 | HEIGHT: 67 IN | HEART RATE: 74 BPM | DIASTOLIC BLOOD PRESSURE: 76 MMHG | SYSTOLIC BLOOD PRESSURE: 109 MMHG | WEIGHT: 142.63 LBS

## 2019-09-24 DIAGNOSIS — M79.2 RADICULAR PAIN IN LEFT ARM: ICD-10-CM

## 2019-09-24 DIAGNOSIS — M79.2 RADICULAR PAIN IN RIGHT ARM: ICD-10-CM

## 2019-09-24 PROCEDURE — 95911 PR NERVE CONDUCTION STUDY; 9-10 STUDIES: ICD-10-PCS | Mod: S$GLB,,, | Performed by: PHYSICAL MEDICINE & REHABILITATION

## 2019-09-24 PROCEDURE — 99204 OFFICE O/P NEW MOD 45 MIN: CPT | Mod: S$GLB,,, | Performed by: PHYSICAL MEDICINE & REHABILITATION

## 2019-09-24 PROCEDURE — 3008F PR BODY MASS INDEX (BMI) DOCUMENTED: ICD-10-PCS | Mod: CPTII,S$GLB,, | Performed by: PHYSICAL MEDICINE & REHABILITATION

## 2019-09-24 PROCEDURE — 95886 MUSC TEST DONE W/N TEST COMP: CPT | Mod: S$GLB,,, | Performed by: PHYSICAL MEDICINE & REHABILITATION

## 2019-09-24 PROCEDURE — 3008F BODY MASS INDEX DOCD: CPT | Mod: CPTII,S$GLB,, | Performed by: PHYSICAL MEDICINE & REHABILITATION

## 2019-09-24 PROCEDURE — 95886 PR EMG COMPLETE, W/ NERVE CONDUCTION STUDIES, 5+ MUSCLES: ICD-10-PCS | Mod: S$GLB,,, | Performed by: PHYSICAL MEDICINE & REHABILITATION

## 2019-09-24 PROCEDURE — 99999 PR PBB SHADOW E&M-EST. PATIENT-LVL III: CPT | Mod: PBBFAC,,, | Performed by: PHYSICAL MEDICINE & REHABILITATION

## 2019-09-24 PROCEDURE — 95911 NRV CNDJ TEST 9-10 STUDIES: CPT | Mod: S$GLB,,, | Performed by: PHYSICAL MEDICINE & REHABILITATION

## 2019-09-24 PROCEDURE — 99204 PR OFFICE/OUTPT VISIT, NEW, LEVL IV, 45-59 MIN: ICD-10-PCS | Mod: S$GLB,,, | Performed by: PHYSICAL MEDICINE & REHABILITATION

## 2019-09-24 PROCEDURE — 99999 PR PBB SHADOW E&M-EST. PATIENT-LVL III: ICD-10-PCS | Mod: PBBFAC,,, | Performed by: PHYSICAL MEDICINE & REHABILITATION

## 2019-09-24 NOTE — LETTER
September 24, 2019      Basil Salcedo PA-C  [address] [place] [id number]           [facility]  [address]  [place] [address]  Phone: [phone]  Fax: [phone]          Patient: Elizabeth Johns   MR Number: [id number]   YOB: 1970   Date of Visit: 9/24/2019       Dear Basil Salcedo:    Thank you for referring Elizabeth Johns to me for evaluation. Attached you will find relevant portions of my assessment and plan of care.    If you have questions, please do not hesitate to call me. I look forward to following Elizabeth Johns along with you.    Sincerely,    Ally Laird MD    Enclosure  CC:  No Recipients    If you would like to receive this communication electronically, please contact [email or web address] or [phone] to request more information on [employer] access.    For providers and/or their staff who would like to refer a patient to [facility], please contact us through our one-stop-shop provider referral line, [facility], at [phone].    If you feel you have received this communication in error or would no longer like to receive these types of communications, please e-mail [email or web address]

## 2019-09-24 NOTE — PROGRESS NOTES
PM&R ELECTRODIAGNOSTIC HISTORY & PHYSICAL:    Full Name: Elizabeth Johns Gender: Female  Patient ID: [id number] YOB: 1970   Visit Date: 9/24/2019 10:59  Age: 49 Years 5 Months Old  Examining Physician: Ally Laird MD  Referring Physician: Basil Salcedo PA-C  Height: 5 feet 7 inch  Reason for Referral: Radicular pain in bilateral arms    HPI: This is a 49 y.o.  female being seen in clinic today for evaluation of Neck Pain and Numbness (Numbness in bilateral hands)   She is seen as a consult from Basil Salcedo and will receive these results electronically. The problem first began last March with numbness in hands and neck pain for about 1 year. She feels stiffness and aching in her bilateral neck. The symptoms show no change. She has tried physical therapy without improvement. Dr. Rhodes thinks facet arthropathy is the main problem, but insurance wouldn't approve facet blocks because of the hand numbness. They want to eval for radiculopathy. She is not sure if it is worse on one side.     History obtained from patient.    Past family, medical, social, surgical history, and vital signs reviewed in chart.    Review of Systems   Constitutional: Negative for chills, fever and weight loss.   HENT: Negative for hearing loss and sore throat.    Eyes: Negative for blurred vision, photophobia and pain.   Respiratory: Negative for shortness of breath.    Cardiovascular: Negative for chest pain.   Gastrointestinal: Negative for abdominal pain.   Genitourinary: Negative for dysuria.   Skin: Negative for rash.   Neurological: Negative for tingling and headaches.   Endo/Heme/Allergies: Does not bruise/bleed easily.   Psychiatric/Behavioral: Negative for depression.       General    Nursing note and vitals reviewed.  Constitutional: She is oriented to person, place, and time. She appears well-developed and well-nourished.   HENT:   Head: Normocephalic and atraumatic.   Eyes: Conjunctivae and EOM are normal.  Pupils are equal, round, and reactive to light.   Neck: Neck supple.   Cardiovascular: Intact distal pulses.    Pulmonary/Chest: Effort normal. No respiratory distress.   Abdominal: She exhibits no distension.   Neurological: She is alert and oriented to person, place, and time. She has normal reflexes.   Psychiatric: She has a normal mood and affect.         Back (L-Spine & T-Spine) / Neck (C-Spine) Exam     Neck (C-Spine) Range of Motion   Flexion:     Mild  Extension: Normal  Right Lateral Bend: normal  Left Lateral Bend: normal  Right Rotation: abnormal  Left Rotation: normal    Spinal Sensation   Right Side Sensation  C-Spine Level: normal   Left Side Sensation  C-Spine Level: normal    Neck (C-Spine) Tests   Spurling's Test   Left:  Negative  Right: negative      Right Hand/Wrist Exam     Inspection   Scars: Wrist - absent   Effusion: Wrist - absent   Deformity: Wrist - deformity Hand -  deformity    Range of Motion     Wrist   Extension: normal   Flexion: normal     Tests   Phalens Sign: negative  Tinel's sign (median nerve): negative  Carpal Tunnel Compression Test: negative    Atrophy   Thenar:  negative    Other     Neuorologic Exam    Median Distribution: normal  Ulnar Distribution: normal  Radial Distribution: normal    Comments:  Normal OK sign. Negative Froment's. - Saenz's.         Left Hand/Wrist Exam     Inspection   Scars: Wrist - absent   Effusion: Wrist - absent   Deformity: Wrist - absent Hand -  absent    Range of Motion     Wrist   Extension: normal   Flexion: normal     Tests   Phalens Sign: negative  Tinel's sign (median nerve): negative  Carpal Tunnel Compression Test: negative    Atrophy  Thenar:  Negative    Other     Sensory Exam  Ulnar Distribution: normal  Radial Distribution: normal      Right Elbow Exam     Tests   Tinel's sign (cubital tunnel): negative      Left Elbow Exam     Tests   Tinel's sign (cubital tunnel): negative        Muscle Strength   Right Upper Extremity    Biceps: 5/5/5   Deltoid:  5/5  Triceps:  5/5  Wrist extension: 5/5/5   Wrist flexion: 5/5/5   : 5/5/5   Thumb - APB: 5/5  Left Upper Extremity  Biceps: 5/5/5   Deltoid:  5/5  Triceps:  5/5  Wrist extension: 5/5/5   Wrist flexion: 5/5/5   :  5/5/5   Thumb - APB: 5/5    Reflexes     Left Side  Biceps:  2+  Left Saenz's Sign:  Absent    Right Side   Biceps:  2+  Right Saenz's Sign:  absent    Vascular Exam     Right Pulses        Carotid:                  2+    Left Pulses        Carotid:                  2+    Capillary Refill  Right Hand: normal capillary refill  Left Hand: normal capillary refill        Findings:    CSI      Nerve / Sites Rec. Site Peak Lat NP Amp Segments Peak Diff     ms µV  ms   R Median - CSI      Median Thumb 2.3 16.5 Median - Radial       Median palm Wrist 2.4 37.5        Ulnar palm Wrist 2.4 12.4        CSI    CSI 0.0   L Median - CSI      Median Thumb 2.2 56.2 Median - Radial       Median palm Wrist 2.2 63.8        Ulnar palm Wrist 2.2 18.8        CSI    CSI 0.0       SNC      Nerve / Sites Rec. Site Onset Lat Peak Lat Amp Segments Distance Velocity     ms ms µV  mm m/s   R Radial - Anatomical snuff box (Forearm)      Forearm Wrist 1.8 2.5 26.6 Forearm - Wrist 100 56   L Radial - Anatomical snuff box (Forearm)      Forearm Wrist 1.9 2.4 22.2 Forearm - Wrist 100 52       MNC      Nerve / Sites Muscle Latency Amplitude Duration Rel Amp Segments Distance Lat Diff Velocity     ms mV ms %  mm ms m/s   R Median - APB      Wrist APB 3.5 11.3 6.8 100 Wrist - APB 80        Elbow APB 7.3 10.9 6.8 96.4 Elbow - Wrist 210 3.8 55   L Median - APB      Wrist APB 3.1 7.8 6.9 100 Wrist - APB 80        Elbow APB 6.9 5.8 6.9 74.6 Elbow - Wrist 200 3.8 53   R Ulnar - ADM      Wrist ADM 2.6 1.9 4.3 100 Wrist - ADM 80        B.Elbow ADM 5.6 1.5 4.6 77.7 B.Elbow - Wrist 190 3.0 64      A.Elbow ADM 7.1 1.3 4.9 86.2 A.Elbow - B.Elbow 100 1.5 66         A.Elbow - Wrist  4.5    L Ulnar - ADM      Wrist  ADM 2.6 4.2 8.4 100 Wrist - ADM 80        B.Elbow ADM 5.8 4.0 8.4 95.9 B.Elbow - Wrist 197 3.2 61      A.Elbow ADM 7.5 3.8 8.4 95.5 A.Elbow - B.Elbow 100 1.7 60         A.Elbow - Wrist  4.9        EMG         EMG Summary Table     Spontaneous MUAP Recruitment   Muscle IA Fib PSW Fasc Other Dur. Dur Amp Dur Polys Pattern Effort   R. First dorsal interosseous N None None None . _NFT_ _NFT_ N N N N .   R. Extensor indicis proprius N None None None . _NFT_ _NFT_ N N N N .   R. Pronator teres N None None None . _NFT_ _NFT_ N N N N .   R. Biceps brachii N None None None . _NFT_ _NFT_ N N N N .   R. Cervical paraspinals N None None None . _NFT_ _NFT_            Results:    - Bilateral median motor and sensory responses were normal.  - Bilateral ulnar motor and sensory responses were normal except for low amplitude in the right ulnar motor response.   - Bilateral superficial radial sensory responses were normal.  - Needle EMG examination of the right upper extremity and cervical paraspinals was normal.     Interpretation:    1. Normal electrodiagnostic examination. No evidence of bilateral median, ulnar, or radial neuropathy. No evidence of right cervical radiculopathy or diffuse polyneuropathy.     2. Should symptoms progress or fail to resolve, repeat studies in 6 to 12 months may be warranted.         IMPRESSION/PLAN: Elizabeth is a 49 y.o.  female with:    1. Radicular pain in left arm  - EMG W/ ULTRASOUND AND NERVE CONDUCTION TEST 2 Extremities    2. Radicular pain in right arm  - EMG W/ ULTRASOUND AND NERVE CONDUCTION TEST 2 Extremities       The findings were discussed with Elizabeth in detail. She will follow-up with Basil/Dr. Rhodes for further evaluation and management. The bilateral carpal tunnel syndrome noted on nerve testing last year at an outside location was not appreciated on today's studies. All of her questions were answered.     Ally Laird M.D.  Physical Medicine and Rehab

## 2019-09-27 DIAGNOSIS — M05.79 RHEUMATOID ARTHRITIS INVOLVING MULTIPLE SITES WITH POSITIVE RHEUMATOID FACTOR: Chronic | ICD-10-CM

## 2019-09-29 RX ORDER — ABATACEPT 125 MG/ML
INJECTION, SOLUTION SUBCUTANEOUS
Qty: 4 ML | Refills: 4 | Status: SHIPPED | OUTPATIENT
Start: 2019-09-29 | End: 2020-03-10 | Stop reason: SDUPTHER

## 2019-10-02 ENCOUNTER — TELEPHONE (OUTPATIENT)
Dept: RHEUMATOLOGY | Facility: CLINIC | Age: 49
End: 2019-10-02

## 2019-10-02 NOTE — TELEPHONE ENCOUNTER
----- Message from Juanis Hopper sent at 10/2/2019 11:41 AM CDT -----  Contact: Caridad (providers office)  Caller request a call back regarding a plan of care  Pt has two doctors and system will only allow the primary physicians listed ... Call back: [phone] ext [phone]

## 2019-10-02 NOTE — TELEPHONE ENCOUNTER
Returned phone call to [facility]. Spoke with Jerrica who informed me that both Dr. Geronimo and Dr. Childers put in a referral to [facility], but in their system they cannot use both referrals from both providers, that they can only go with the provider on the first referral. The order that they received from Dr. Childers is to treat the neck and the back. She also informed me that she she needs the order, re-signed, the other providers name scratched out then faxed back to [facility] at [phone]. I informed Jerrica that Dr. Childers will not be back in the clinic until next Monday or Tuesday. Jerrica verbalized understanding and said that it was fine.

## 2019-10-06 ENCOUNTER — PATIENT MESSAGE (OUTPATIENT)
Dept: RHEUMATOLOGY | Facility: CLINIC | Age: 49
End: 2019-10-06

## 2019-10-07 NOTE — PROGRESS NOTES
I read though the physical therapy notes from [facility].  They would like to continue therapy twice a week to her back neck and I approved that today and we faxed form back to Jerrica at [facility]

## 2019-10-09 ENCOUNTER — TELEPHONE (OUTPATIENT)
Dept: NEUROSURGERY | Facility: CLINIC | Age: 49
End: 2019-10-09

## 2019-10-09 NOTE — TELEPHONE ENCOUNTER
Spoke with pt in regards to cancelling appt for today.    Pt canceled because she wasn't approved     understanding verbalized.

## 2019-11-21 ENCOUNTER — PATIENT MESSAGE (OUTPATIENT)
Dept: RHEUMATOLOGY | Facility: CLINIC | Age: 49
End: 2019-11-21

## 2019-11-21 ENCOUNTER — OFFICE VISIT (OUTPATIENT)
Dept: RHEUMATOLOGY | Facility: CLINIC | Age: 49
End: 2019-11-21
Payer: COMMERCIAL

## 2019-11-21 ENCOUNTER — LAB VISIT (OUTPATIENT)
Dept: LAB | Facility: HOSPITAL | Age: 49
End: 2019-11-21
Attending: INTERNAL MEDICINE
Payer: COMMERCIAL

## 2019-11-21 VITALS
SYSTOLIC BLOOD PRESSURE: 105 MMHG | DIASTOLIC BLOOD PRESSURE: 70 MMHG | BODY MASS INDEX: 23.04 KG/M2 | HEART RATE: 68 BPM | HEIGHT: 67 IN | WEIGHT: 146.81 LBS

## 2019-11-21 DIAGNOSIS — Z79.899 HIGH RISK MEDICATION USE: ICD-10-CM

## 2019-11-21 DIAGNOSIS — M05.79 RHEUMATOID ARTHRITIS INVOLVING MULTIPLE SITES WITH POSITIVE RHEUMATOID FACTOR: Primary | Chronic | ICD-10-CM

## 2019-11-21 DIAGNOSIS — Z51.81 MEDICATION MONITORING ENCOUNTER: Chronic | ICD-10-CM

## 2019-11-21 DIAGNOSIS — D84.9 IMMUNOCOMPROMISED: ICD-10-CM

## 2019-11-21 DIAGNOSIS — M05.79 RHEUMATOID ARTHRITIS INVOLVING MULTIPLE SITES WITH POSITIVE RHEUMATOID FACTOR: Chronic | ICD-10-CM

## 2019-11-21 LAB
ALBUMIN SERPL BCP-MCNC: 3.9 G/DL (ref 3.5–5.2)
ALP SERPL-CCNC: 82 U/L (ref 55–135)
ALT SERPL W/O P-5'-P-CCNC: 18 U/L (ref 10–44)
ANION GAP SERPL CALC-SCNC: 8 MMOL/L (ref 8–16)
AST SERPL-CCNC: 22 U/L (ref 10–40)
BASOPHILS # BLD AUTO: 0.05 K/UL (ref 0–0.2)
BASOPHILS NFR BLD: 1.2 % (ref 0–1.9)
BILIRUB SERPL-MCNC: 0.5 MG/DL (ref 0.1–1)
BUN SERPL-MCNC: 15 MG/DL (ref 6–20)
CALCIUM SERPL-MCNC: 9.1 MG/DL (ref 8.7–10.5)
CHLORIDE SERPL-SCNC: 107 MMOL/L (ref 95–110)
CO2 SERPL-SCNC: 26 MMOL/L (ref 23–29)
CREAT SERPL-MCNC: 1 MG/DL (ref 0.5–1.4)
CRP SERPL-MCNC: 0.5 MG/L (ref 0–8.2)
DIFFERENTIAL METHOD: ABNORMAL
EOSINOPHIL # BLD AUTO: 0.6 K/UL (ref 0–0.5)
EOSINOPHIL NFR BLD: 13.4 % (ref 0–8)
ERYTHROCYTE [DISTWIDTH] IN BLOOD BY AUTOMATED COUNT: 12 % (ref 11.5–14.5)
ERYTHROCYTE [SEDIMENTATION RATE] IN BLOOD BY WESTERGREN METHOD: <2 MM/HR (ref 0–36)
EST. GFR  (AFRICAN AMERICAN): >60 ML/MIN/1.73 M^2
EST. GFR  (NON AFRICAN AMERICAN): >60 ML/MIN/1.73 M^2
GLUCOSE SERPL-MCNC: 123 MG/DL (ref 70–110)
HCT VFR BLD AUTO: 42.4 % (ref 37–48.5)
HGB BLD-MCNC: 13.7 G/DL (ref 12–16)
IMM GRANULOCYTES # BLD AUTO: 0.01 K/UL (ref 0–0.04)
IMM GRANULOCYTES NFR BLD AUTO: 0.2 % (ref 0–0.5)
LYMPHOCYTES # BLD AUTO: 1.4 K/UL (ref 1–4.8)
LYMPHOCYTES NFR BLD: 33.8 % (ref 18–48)
MCH RBC QN AUTO: 31.4 PG (ref 27–31)
MCHC RBC AUTO-ENTMCNC: 32.3 G/DL (ref 32–36)
MCV RBC AUTO: 97 FL (ref 82–98)
MONOCYTES # BLD AUTO: 0.5 K/UL (ref 0.3–1)
MONOCYTES NFR BLD: 11.3 % (ref 4–15)
NEUTROPHILS # BLD AUTO: 1.7 K/UL (ref 1.8–7.7)
NEUTROPHILS NFR BLD: 40.3 % (ref 38–73)
NRBC BLD-RTO: 0 /100 WBC
PLATELET # BLD AUTO: 259 K/UL (ref 150–350)
PMV BLD AUTO: 9.4 FL (ref 9.2–12.9)
POTASSIUM SERPL-SCNC: 4.3 MMOL/L (ref 3.5–5.1)
PROT SERPL-MCNC: 6.3 G/DL (ref 6–8.4)
RBC # BLD AUTO: 4.37 M/UL (ref 4–5.4)
SODIUM SERPL-SCNC: 141 MMOL/L (ref 136–145)
WBC # BLD AUTO: 4.17 K/UL (ref 3.9–12.7)

## 2019-11-21 PROCEDURE — 99214 OFFICE O/P EST MOD 30 MIN: CPT | Mod: S$GLB,,, | Performed by: PHYSICIAN ASSISTANT

## 2019-11-21 PROCEDURE — 3008F BODY MASS INDEX DOCD: CPT | Mod: CPTII,S$GLB,, | Performed by: PHYSICIAN ASSISTANT

## 2019-11-21 PROCEDURE — 99214 PR OFFICE/OUTPT VISIT, EST, LEVL IV, 30-39 MIN: ICD-10-PCS | Mod: S$GLB,,, | Performed by: PHYSICIAN ASSISTANT

## 2019-11-21 PROCEDURE — 99999 PR PBB SHADOW E&M-EST. PATIENT-LVL IV: ICD-10-PCS | Mod: PBBFAC,,, | Performed by: PHYSICIAN ASSISTANT

## 2019-11-21 PROCEDURE — 36415 COLL VENOUS BLD VENIPUNCTURE: CPT

## 2019-11-21 PROCEDURE — 99999 PR PBB SHADOW E&M-EST. PATIENT-LVL IV: CPT | Mod: PBBFAC,,, | Performed by: PHYSICIAN ASSISTANT

## 2019-11-21 PROCEDURE — 3008F PR BODY MASS INDEX (BMI) DOCUMENTED: ICD-10-PCS | Mod: CPTII,S$GLB,, | Performed by: PHYSICIAN ASSISTANT

## 2019-11-21 PROCEDURE — 85025 COMPLETE CBC W/AUTO DIFF WBC: CPT

## 2019-11-21 PROCEDURE — 86140 C-REACTIVE PROTEIN: CPT

## 2019-11-21 PROCEDURE — 80053 COMPREHEN METABOLIC PANEL: CPT

## 2019-11-21 PROCEDURE — 85652 RBC SED RATE AUTOMATED: CPT

## 2019-11-21 ASSESSMENT — ROUTINE ASSESSMENT OF PATIENT INDEX DATA (RAPID3): MDHAQ FUNCTION SCORE: 0

## 2019-11-21 NOTE — LETTER
November 21, 2019      Osmar Childers MD  [address]  [place] [address]           [facility]  [address] [facility] [address]  Phone: [phone]  Fax: [phone]          Patient: Elizabeth Johns   MR Number: [id number]   YOB: 1970   Date of Visit: 11/21/2019       Dear Dr. Osmar Childers:    Thank you for referring Elizabeth Johns to me for evaluation. Attached you will find relevant portions of my assessment and plan of care.    If you have questions, please do not hesitate to call me. I look forward to following Elizabeth Johns along with you.    Sincerely,    Niurka Rubio PA-C    Enclosure  CC:  No Recipients    If you would like to receive this communication electronically, please contact [email or web address] or [phone] to request more information on [facility] Link access.    For providers and/or their staff who would like to refer a patient to [facility], please contact us through our one-stop-shop provider referral line, [facility], at [phone].    If you feel you have received this communication in error or would no longer like to receive these types of communications, please e-mail [email or web address]

## 2019-11-21 NOTE — PROGRESS NOTES
Subjective:       Patient ID: Elizabeth Johns is a 49 y.o. female.    Chief Complaint: Rheumatoid Arthritis    Elizabeth is here today Rheum follow up.      She has seropositive (+ccp) nonerosive rheumatoid arthritis. She has been on orencia for several years. Her Orencia injection  Out every once a month.       Her ccp ab was rechecked and has increased. This concerns her- RA stable no recent exacerbations     She is not on oral dmard meds. she is taking cbd oil daily, turmeric, iron red krill, boswelia, juice plus   thinks it has helped keeping her healthy Pain 0/10.     Had some neck issues- chronic muscle spasm  5 view flex/ext concerns for instability, mri was neg, cleared by Neurosurgery  Changed to different  Chiropractor  Doing therapy , traction and modalities to improve posture/ergonomics  Her neck pain is better.     Failed mtx monotherapy in the past. Off mtx due to side effects (fatigue and nausea). She is not on prednisone. In the past failed humira, enbrel and xeljanz these just did not help  Uses pennsaid topical bid prn. High titers CCP positive but sedimentation rate and CRP have been normal.  Rheumatoid factor negative.                   She reports no joint swelling. Pertinent negatives include no dysuria, fatigue, fever, trouble swallowing, myalgias or headaches.         Review of Systems   Constitutional: Negative.  Negative for activity change, appetite change, chills, fatigue and fever.   HENT: Negative.  Negative for mouth sores and trouble swallowing.         No dry mouth   Eyes: Negative.  Negative for photophobia, pain and redness.        No swollen or red eyes, no dry eye     Respiratory: Negative.  Negative for chest tightness, shortness of breath, wheezing and stridor.    Cardiovascular: Negative.  Negative for chest pain.   Gastrointestinal: Negative.  Negative for abdominal pain, blood in stool, diarrhea, nausea and vomiting.   Genitourinary: Negative.  Negative for dysuria,  "frequency, hematuria and urgency.   Musculoskeletal: Negative for arthralgias, back pain, gait problem, joint swelling, myalgias, neck pain and neck stiffness.   Skin: Negative.  Negative for color change, pallor and rash.   Neurological: Negative.  Negative for weakness and headaches.   Hematological: Negative for adenopathy.   Psychiatric/Behavioral: Negative for suicidal ideas.         Objective:     Past Medical History:   Diagnosis Date    Arthritis     Rheumatoid        Immunization History   Administered Date(s) Administered    DTaP 06/26/2009    Influenza - High Dose - PF (65 years and older) 11/09/2016, 11/14/2017, 11/21/2018    Influenza - Quadrivalent 11/05/2014, 10/19/2015    Influenza Split 10/10/2013    Pneumococcal Conjugate - 13 Valent 12/19/2014    Pneumococcal Polysaccharide - 23 Valent 12/02/2015    Tdap 07/21/2016    Zoster 10/10/2013       /70   Pulse 68   Ht 5' 7" (1.702 m)   Wt 66.6 kg (146 lb 13.2 oz)   BMI 23.00 kg/m²      Physical Exam   Constitutional: She is oriented to person, place, and time and well-developed, well-nourished, and in no distress. No distress.   HENT:   Head: Normocephalic and atraumatic.   Right Ear: External ear normal.   Left Ear: External ear normal.   Mouth/Throat: No oropharyngeal exudate.   Eyes: Conjunctivae and EOM are normal. Pupils are equal, round, and reactive to light. No scleral icterus.   Neck: Normal range of motion. Neck supple. No thyromegaly present.   Cardiovascular: Normal rate, regular rhythm and normal heart sounds.    No murmur heard.  Pulmonary/Chest: Effort normal and breath sounds normal. She exhibits no tenderness.   Abdominal: Soft. Bowel sounds are normal.       Right Side Rheumatological Exam     The patient is tender to palpation of the shoulder      Lymphadenopathy:     She has no cervical adenopathy.   Neurological: She is alert and oriented to person, place, and time. She displays normal reflexes. No cranial nerve " deficit. She exhibits normal muscle tone. Gait normal.   Skin: Skin is warm and dry. No rash noted.     Musculoskeletal: Normal range of motion. She exhibits no edema.   Limited right lateral rotation about 75° with pain  Left lateral rotation about 85 to green minimal pain  Normal extension and flexion  Right upper neck tenderness to palpation over the paraspinous muscles, levator scapula and trapezius muscle  Normal shoulder range of motion bilaterally some mild tenderness to palpation right biceps tendon some mild crepitus in the right shoulder  No synovitis On exam today   no muscle weakness  Strength 5/5 to all measures flexors and extensors  Reflexes are normal, no hyperreflexia or clonus noted                      Recent Results (from the past 336 hour(s))   CBC auto differential    Collection Time: 11/21/19 11:15 AM   Result Value Ref Range    WBC 4.17 3.90 - 12.70 K/uL    RBC 4.37 4.00 - 5.40 M/uL    Hemoglobin 13.7 12.0 - 16.0 g/dL    Hematocrit 42.4 37.0 - 48.5 %    Mean Corpuscular Volume 97 82 - 98 fL    Mean Corpuscular Hemoglobin 31.4 (H) 27.0 - 31.0 pg    Mean Corpuscular Hemoglobin Conc 32.3 32.0 - 36.0 g/dL    RDW 12.0 11.5 - 14.5 %    Platelets 259 150 - 350 K/uL    MPV 9.4 9.2 - 12.9 fL    Immature Granulocytes 0.2 0.0 - 0.5 %    Gran # (ANC) 1.7 (L) 1.8 - 7.7 K/uL    Immature Grans (Abs) 0.01 0.00 - 0.04 K/uL    Lymph # 1.4 1.0 - 4.8 K/uL    Mono # 0.5 0.3 - 1.0 K/uL    Eos # 0.6 (H) 0.0 - 0.5 K/uL    Baso # 0.05 0.00 - 0.20 K/uL    nRBC 0 0 /100 WBC    Gran% 40.3 38.0 - 73.0 %    Lymph% 33.8 18.0 - 48.0 %    Mono% 11.3 4.0 - 15.0 %    Eosinophil% 13.4 (H) 0.0 - 8.0 %    Basophil% 1.2 0.0 - 1.9 %    Differential Method Automated    Comprehensive metabolic panel    Collection Time: 11/21/19 11:15 AM   Result Value Ref Range    Sodium 141 136 - 145 mmol/L    Potassium 4.3 3.5 - 5.1 mmol/L    Chloride 107 95 - 110 mmol/L    CO2 26 23 - 29 mmol/L    Glucose 123 (H) 70 - 110 mg/dL    BUN, Bld 15 6 -  20 mg/dL    Creatinine 1.0 0.5 - 1.4 mg/dL    Calcium 9.1 8.7 - 10.5 mg/dL    Total Protein 6.3 6.0 - 8.4 g/dL    Albumin 3.9 3.5 - 5.2 g/dL    Total Bilirubin 0.5 0.1 - 1.0 mg/dL    Alkaline Phosphatase 82 55 - 135 U/L    AST 22 10 - 40 U/L    ALT 18 10 - 44 U/L    Anion Gap 8 8 - 16 mmol/L    eGFR if African American >60 >60 mL/min/1.73 m^2    eGFR if non African American >60 >60 mL/min/1.73 m^2   C-reactive protein    Collection Time: 11/21/19 11:15 AM   Result Value Ref Range    CRP 0.5 0.0 - 8.2 mg/L     Lab Results   Component Value Date    HEPBCAB Negative 04/16/2019    HEPCAB Negative 10/10/2013       No results found for: TBGOLDPLUS       Ref. Range 7/18/2016 14:25 4/4/2017 17:25 2/21/2018 14:15 9/25/2018 07:35 8/19/2019 15:39   CCP Antibodies Latest Ref Range: <5.0 U/mL 107.4 (H) 163.6 (H) 125.7 (H) 115.2 (H) 270.5 (H)          Ref. Range 4/15/2014 13:30 3/31/2016 17:24 4/16/2019 14:18   Hep B Core Total Ab Unknown   Negative   Hep B S Ab Unknown   Negative   Hepatitis B Surface Ag Unknown   Negative   NIL Latest Units: IU/mL 0.03     Quantiferon Gold TB Latest Ref Range: Negative  Negative     TB-Nil Latest Units: IU/mL <0.00           MRI Cervical Spine Without Contrast  Narrative: EXAMINATION:  MRI CERVICAL SPINE WITHOUT CONTRAST    CLINICAL HISTORY:  Neck pain, prior xray, abn neuro exam;anterilolisthesis, abnormal x-ray suggesting instability;.  Cervicalgia    TECHNIQUE:  Multiplanar, multisequence MR images of the cervical spine were acquired without the administration of contrast.    COMPARISON:  MRI cervical spine from October 2013. radiographs from 08/21/2019    FINDINGS:  Visualized posterior fossa is intact.  The cord demonstrates normal signal intensity.  A vertebral body hemangioma is seen within C7.  This is unchanged.  No acute fracture.  No marrow edema.    C2-C3: Unremarkable    C3-C4: There is mild uncovertebral and facet arthropathy with mild bilateral neural foraminal narrowing which  appears greater on the left.    C4-C5: There is uncovertebral hypertrophy which is greater on the left.  No significant spinal canal or neural foraminal stenosis.    C5-C6: A small left perineural cyst is again noted, unchanged.  No spinal canal stenosis.  Right neural foramen appears normal.    C6-C7: Facet arthropathy.  Mild uncovertebral hypertrophy.  Bilateral perineural cysts are again seen, unchanged.  Neural foramina otherwise appear normal.  Mild uncovertebral and facet arthropathy.  Spinal canal is maintained.    C7-T1: Small right perineural cyst again noted.  Neural foramina otherwise appears normal.  Mild facet arthropathy.  No spinal canal stenosis.  Impression: Overall, no substantial change since MRI from 10/18/2013.  Please see above for details.    Electronically signed by: Emeterio Beebe MD  Date:    08/23/2019  Time:    09:23               Assessment:       1. Rheumatoid arthritis involving multiple sites with positive rheumatoid factor    2. High risk medication use    3. Immunocompromised          Impression:  Seropositive rheumatoid arthritis -currently on orencia 125mg SQ every 28 days     EDDY-28 (CRP): 1.74 (Remission)  CDAI 3, BHASKAR 0    oral methotrexate because of side effects  In the past failed methotrexate monotherapy, failed Humira, Enbrel and Xeljanz    Immunocompromised:  Vaccines up-to-date      Medication monitoring with no toxicity side effects, chronic immunosuppression with no recurrent infections        Neck pain - muscle spasm - djd/facet arthritis - doing better with at home traction, pt, chiropractor care  C-spine X-ray possible instability, mri neg for instability, pt seen by Neurosurgery, cleared for PT         Plan:         C/w orencia Q 28 days   No need to add dmard    C/w chiropractor, pt and at home traction   ergonomics and posture     Prn use of  meloxicam 7.5 mg at night with food    Tb and hep labs up to date     Declining flu vaccine     X-ray hand and feet every  1-2 yrs   Topical Voltaren gel  4 g qid prn   rtc 3-4 months with labs reg 4

## 2019-12-22 ENCOUNTER — PATIENT MESSAGE (OUTPATIENT)
Dept: RHEUMATOLOGY | Facility: CLINIC | Age: 49
End: 2019-12-22

## 2019-12-23 ENCOUNTER — PATIENT MESSAGE (OUTPATIENT)
Dept: RHEUMATOLOGY | Facility: CLINIC | Age: 49
End: 2019-12-23

## 2019-12-23 RX ORDER — METHYLPREDNISOLONE 4 MG/1
TABLET ORAL
Qty: 1 PACKAGE | Refills: 0 | Status: SHIPPED | OUTPATIENT
Start: 2019-12-23 | End: 2020-03-09

## 2019-12-30 ENCOUNTER — PATIENT MESSAGE (OUTPATIENT)
Dept: RHEUMATOLOGY | Facility: CLINIC | Age: 49
End: 2019-12-30

## 2019-12-30 ENCOUNTER — PROCEDURE VISIT (OUTPATIENT)
Dept: RHEUMATOLOGY | Facility: CLINIC | Age: 49
End: 2019-12-30
Payer: COMMERCIAL

## 2019-12-30 DIAGNOSIS — M75.51 SUBACROMIAL BURSITIS OF RIGHT SHOULDER JOINT: Primary | ICD-10-CM

## 2019-12-30 PROCEDURE — 20610 DRAIN/INJ JOINT/BURSA W/O US: CPT | Mod: RT,S$GLB,, | Performed by: PHYSICIAN ASSISTANT

## 2019-12-30 PROCEDURE — 20610 LARGE JOINT ASPIRATION/INJECTION: R SUBACROMIAL BURSA: ICD-10-PCS | Mod: RT,S$GLB,, | Performed by: PHYSICIAN ASSISTANT

## 2019-12-30 RX ORDER — TRIAMCINOLONE ACETONIDE 40 MG/ML
40 INJECTION, SUSPENSION INTRA-ARTICULAR; INTRAMUSCULAR
Status: DISCONTINUED | OUTPATIENT
Start: 2019-12-30 | End: 2019-12-30 | Stop reason: HOSPADM

## 2019-12-30 RX ORDER — METHYLPREDNISOLONE 4 MG/1
TABLET ORAL
Qty: 1 PACKAGE | Refills: 0 | Status: SHIPPED | OUTPATIENT
Start: 2019-12-30 | End: 2020-01-20

## 2019-12-30 RX ORDER — MELOXICAM 15 MG/1
15 TABLET ORAL DAILY
Qty: 30 TABLET | Refills: 3 | Status: SHIPPED | OUTPATIENT
Start: 2019-12-30 | End: 2020-07-29 | Stop reason: SDUPTHER

## 2019-12-30 RX ADMIN — TRIAMCINOLONE ACETONIDE 40 MG: 40 INJECTION, SUSPENSION INTRA-ARTICULAR; INTRAMUSCULAR at 11:12

## 2019-12-30 NOTE — PROCEDURES
Large Joint Aspiration/Injection: R subacromial bursa  Date/Time: 12/30/2019 11:30 AM  Performed by: Ramila Lund PA-C  Authorized by: Ramila Lund PA-C     Consent Done?:  Yes (Verbal)  Procedure site marked: Yes    Timeout: Prior to procedure the correct patient, procedure, and site was verified    Anesthesia  Local anesthesia used  Anesthetic: lidocaine 1% with epinephrine    Location:  Shoulder  Site:  R subacromial bursa  Medications:  40 mg triamcinolone acetonide 40 mg/mL  Patient tolerance:  Patient tolerated the procedure well with no immediate complications    Additional Comments: Procedure note: After verbal consent was obtained.  The right shoulder was prepared with sterile prep.  The skin was anesthetized with 1% ethyl chloride.  The shoulder joint was injected with 2.5 cc of 1% lidocaine to anesthetize the joint.  The shoulder joint was then injected with 40mg kenalog  and 1.0 mL of 1% lidocaine.  Hemostasis was obtained.  The patient tolerated procedure well with no complications.  Patient discharged with icing instructions

## 2019-12-30 NOTE — TELEPHONE ENCOUNTER
Last message to patient was sent in error.    Spoke to patient and scheduled her for a procedure visit with Ramila at 11:30am today

## 2020-01-15 ENCOUNTER — DOCUMENTATION ONLY (OUTPATIENT)
Dept: RHEUMATOLOGY | Facility: CLINIC | Age: 50
End: 2020-01-15

## 2020-01-15 NOTE — CARE UPDATE
Received PA from [facility] for Orencia 120mg/ml syringe for 01/14/2020 - 01/13/2021, will send to be scanned into pt chart. ZS

## 2020-01-15 NOTE — PROGRESS NOTES
Received PA from [facility] for Orencia 125mg/ml syringefrom 01/14/2020 - 01/13/2021, will send rx to be scanned into pt chart, ZS

## 2020-03-04 ENCOUNTER — PATIENT MESSAGE (OUTPATIENT)
Dept: RHEUMATOLOGY | Facility: CLINIC | Age: 50
End: 2020-03-04

## 2020-03-05 ENCOUNTER — TELEPHONE (OUTPATIENT)
Dept: RHEUMATOLOGY | Facility: CLINIC | Age: 50
End: 2020-03-05

## 2020-03-05 ENCOUNTER — PATIENT MESSAGE (OUTPATIENT)
Dept: NEUROSURGERY | Facility: CLINIC | Age: 50
End: 2020-03-05

## 2020-03-09 ENCOUNTER — LAB VISIT (OUTPATIENT)
Dept: LAB | Facility: HOSPITAL | Age: 50
End: 2020-03-09
Attending: PHYSICIAN ASSISTANT
Payer: COMMERCIAL

## 2020-03-09 ENCOUNTER — OFFICE VISIT (OUTPATIENT)
Dept: RHEUMATOLOGY | Facility: CLINIC | Age: 50
End: 2020-03-09
Payer: COMMERCIAL

## 2020-03-09 VITALS
SYSTOLIC BLOOD PRESSURE: 113 MMHG | HEIGHT: 67 IN | DIASTOLIC BLOOD PRESSURE: 73 MMHG | HEART RATE: 62 BPM | WEIGHT: 149.06 LBS | BODY MASS INDEX: 23.39 KG/M2

## 2020-03-09 DIAGNOSIS — Z51.81 MEDICATION MONITORING ENCOUNTER: Chronic | ICD-10-CM

## 2020-03-09 DIAGNOSIS — M05.79 RHEUMATOID ARTHRITIS INVOLVING MULTIPLE SITES WITH POSITIVE RHEUMATOID FACTOR: Chronic | ICD-10-CM

## 2020-03-09 DIAGNOSIS — Z79.899 HIGH RISK MEDICATION USE: ICD-10-CM

## 2020-03-09 DIAGNOSIS — D84.9 IMMUNOCOMPROMISED: ICD-10-CM

## 2020-03-09 DIAGNOSIS — M05.742 RHEUMATOID ARTHRITIS INVOLVING LEFT HAND WITH POSITIVE RHEUMATOID FACTOR: ICD-10-CM

## 2020-03-09 DIAGNOSIS — M05.79 RHEUMATOID ARTHRITIS INVOLVING MULTIPLE SITES WITH POSITIVE RHEUMATOID FACTOR: Primary | Chronic | ICD-10-CM

## 2020-03-09 DIAGNOSIS — M62.838 CERVICAL PARASPINOUS MUSCLE SPASM: ICD-10-CM

## 2020-03-09 LAB
ALBUMIN SERPL BCP-MCNC: 4.1 G/DL (ref 3.5–5.2)
ALP SERPL-CCNC: 66 U/L (ref 55–135)
ALT SERPL W/O P-5'-P-CCNC: 21 U/L (ref 10–44)
ANION GAP SERPL CALC-SCNC: 8 MMOL/L (ref 8–16)
AST SERPL-CCNC: 25 U/L (ref 10–40)
BASOPHILS # BLD AUTO: 0.05 K/UL (ref 0–0.2)
BASOPHILS NFR BLD: 1.2 % (ref 0–1.9)
BILIRUB SERPL-MCNC: 0.6 MG/DL (ref 0.1–1)
BUN SERPL-MCNC: 27 MG/DL (ref 6–20)
CALCIUM SERPL-MCNC: 9.6 MG/DL (ref 8.7–10.5)
CHLORIDE SERPL-SCNC: 105 MMOL/L (ref 95–110)
CO2 SERPL-SCNC: 29 MMOL/L (ref 23–29)
CREAT SERPL-MCNC: 0.8 MG/DL (ref 0.5–1.4)
CRP SERPL-MCNC: 0.3 MG/L (ref 0–8.2)
DIFFERENTIAL METHOD: ABNORMAL
EOSINOPHIL # BLD AUTO: 0.3 K/UL (ref 0–0.5)
EOSINOPHIL NFR BLD: 6.4 % (ref 0–8)
ERYTHROCYTE [DISTWIDTH] IN BLOOD BY AUTOMATED COUNT: 13.2 % (ref 11.5–14.5)
ERYTHROCYTE [SEDIMENTATION RATE] IN BLOOD BY WESTERGREN METHOD: <2 MM/HR (ref 0–36)
EST. GFR  (AFRICAN AMERICAN): >60 ML/MIN/1.73 M^2
EST. GFR  (NON AFRICAN AMERICAN): >60 ML/MIN/1.73 M^2
GLUCOSE SERPL-MCNC: 110 MG/DL (ref 70–110)
HCT VFR BLD AUTO: 42.7 % (ref 37–48.5)
HGB BLD-MCNC: 14.3 G/DL (ref 12–16)
IMM GRANULOCYTES # BLD AUTO: 0.01 K/UL (ref 0–0.04)
IMM GRANULOCYTES NFR BLD AUTO: 0.2 % (ref 0–0.5)
LYMPHOCYTES # BLD AUTO: 1.5 K/UL (ref 1–4.8)
LYMPHOCYTES NFR BLD: 34.4 % (ref 18–48)
MCH RBC QN AUTO: 32.1 PG (ref 27–31)
MCHC RBC AUTO-ENTMCNC: 33.5 G/DL (ref 32–36)
MCV RBC AUTO: 96 FL (ref 82–98)
MONOCYTES # BLD AUTO: 0.4 K/UL (ref 0.3–1)
MONOCYTES NFR BLD: 9.9 % (ref 4–15)
NEUTROPHILS # BLD AUTO: 2.1 K/UL (ref 1.8–7.7)
NEUTROPHILS NFR BLD: 48.1 % (ref 38–73)
NRBC BLD-RTO: 0 /100 WBC
PLATELET # BLD AUTO: 249 K/UL (ref 150–350)
PMV BLD AUTO: 9.9 FL (ref 9.2–12.9)
POTASSIUM SERPL-SCNC: 4.1 MMOL/L (ref 3.5–5.1)
PROT SERPL-MCNC: 6.8 G/DL (ref 6–8.4)
RBC # BLD AUTO: 4.45 M/UL (ref 4–5.4)
SODIUM SERPL-SCNC: 142 MMOL/L (ref 136–145)
WBC # BLD AUTO: 4.25 K/UL (ref 3.9–12.7)

## 2020-03-09 PROCEDURE — 86200 CCP ANTIBODY: CPT

## 2020-03-09 PROCEDURE — 3008F PR BODY MASS INDEX (BMI) DOCUMENTED: ICD-10-PCS | Mod: CPTII,S$GLB,, | Performed by: PHYSICIAN ASSISTANT

## 2020-03-09 PROCEDURE — 85025 COMPLETE CBC W/AUTO DIFF WBC: CPT

## 2020-03-09 PROCEDURE — 3008F BODY MASS INDEX DOCD: CPT | Mod: CPTII,S$GLB,, | Performed by: PHYSICIAN ASSISTANT

## 2020-03-09 PROCEDURE — 99214 PR OFFICE/OUTPT VISIT, EST, LEVL IV, 30-39 MIN: ICD-10-PCS | Mod: S$GLB,,, | Performed by: PHYSICIAN ASSISTANT

## 2020-03-09 PROCEDURE — 99999 PR PBB SHADOW E&M-EST. PATIENT-LVL IV: CPT | Mod: PBBFAC,,, | Performed by: PHYSICIAN ASSISTANT

## 2020-03-09 PROCEDURE — 99214 OFFICE O/P EST MOD 30 MIN: CPT | Mod: S$GLB,,, | Performed by: PHYSICIAN ASSISTANT

## 2020-03-09 PROCEDURE — 85652 RBC SED RATE AUTOMATED: CPT

## 2020-03-09 PROCEDURE — 86140 C-REACTIVE PROTEIN: CPT

## 2020-03-09 PROCEDURE — 36415 COLL VENOUS BLD VENIPUNCTURE: CPT

## 2020-03-09 PROCEDURE — 80053 COMPREHEN METABOLIC PANEL: CPT

## 2020-03-09 PROCEDURE — 99999 PR PBB SHADOW E&M-EST. PATIENT-LVL IV: ICD-10-PCS | Mod: PBBFAC,,, | Performed by: PHYSICIAN ASSISTANT

## 2020-03-09 RX ORDER — BACLOFEN 10 MG/1
10 TABLET ORAL NIGHTLY PRN
Qty: 30 TABLET | Refills: 1 | Status: SHIPPED | OUTPATIENT
Start: 2020-03-09 | End: 2020-04-07

## 2020-03-09 NOTE — PROGRESS NOTES
Subjective:       Patient ID: Elizabeth Johns is a 49 y.o. female.    Chief Complaint: Rheumatoid Arthritis    Elizabeth is here today Rheum follow up.      She has seropositive (+ccp) nonerosive rheumatoid arthritis. She has been on orencia for several years. Her Orencia injection  Out every once a month.       Last year Her ccp ab was rechecked and has increased. This concerns her- RA stable no recent exacerbations     She is not on oral dmard meds. she is taking cbd oil daily, turmeric, iron red krill, boswelia, juice plus   thinks it has helped keeping her healthy     Pain 7-8/10 neck right side> Left and pain right shoulder radiating down right arm to elbow  Mri showed ddd/djd and facet arthritis gabino C7 region  Prior x-ray shoulder showed some AC joint  Working w/chriropractor on ROM, did dry needling    Last night pain kept her up; did not try taking flexeril used this in the past; has mobic but does not use this either  Took 1/2 norco to help her sleep  Had severe nerve like pain    Had some neck issues- chronic muscle spasm  5 view flex/ext concerns for instability, mri was neg, cleared by Neurosurgery  Changed to different  Chiropractor  Doing therapy , traction and modalities to improve posture/ergonomics  But issues ongoing since last year     Scheduled to see dr moya tomorrow- ortho provider to evaluate her shoulder  Has not been back to spine provider yet     Failed mtx monotherapy in the past. Off mtx due to side effects (fatigue and nausea). She is not on prednisone. In the past failed humira, enbrel and xeljanz these just did not help  Uses pennsaid topical bid prn. High titers CCP positive but sedimentation rate and CRP have been normal.  Rheumatoid factor negative.                   She reports no joint swelling. Pertinent negatives include no dysuria, fatigue, fever, trouble swallowing, myalgias or headaches.         Review of Systems   Constitutional: Negative.  Negative for activity change,  "appetite change, chills, fatigue and fever.   HENT: Negative.  Negative for mouth sores and trouble swallowing.         No dry mouth   Eyes: Negative.  Negative for photophobia, pain and redness.        No swollen or red eyes, no dry eye     Respiratory: Negative.  Negative for chest tightness, shortness of breath, wheezing and stridor.    Cardiovascular: Negative.  Negative for chest pain.   Gastrointestinal: Negative.  Negative for abdominal pain, blood in stool, diarrhea, nausea and vomiting.   Genitourinary: Negative.  Negative for dysuria, frequency, hematuria and urgency.   Musculoskeletal: Negative for arthralgias, back pain, gait problem, joint swelling, myalgias, neck pain and neck stiffness.   Skin: Negative.  Negative for color change, pallor and rash.   Neurological: Negative.  Negative for weakness and headaches.   Hematological: Negative for adenopathy.   Psychiatric/Behavioral: Negative for suicidal ideas.         Objective:     Past Medical History:   Diagnosis Date    Arthritis     Rheumatoid        Immunization History   Administered Date(s) Administered    DTaP 06/26/2009    Influenza - High Dose - PF (65 years and older) 11/09/2016, 11/14/2017, 11/21/2018    Influenza - Quadrivalent 11/05/2014, 10/19/2015    Influenza Split 10/10/2013    Pneumococcal Conjugate - 13 Valent 12/19/2014    Pneumococcal Polysaccharide - 23 Valent 12/02/2015    Tdap 07/21/2016    Zoster 10/10/2013       /73   Pulse 62   Ht 5' 7" (1.702 m)   Wt 67.6 kg (149 lb 0.5 oz)   BMI 23.34 kg/m²      Physical Exam   Constitutional: She is oriented to person, place, and time and well-developed, well-nourished, and in no distress. No distress.   HENT:   Head: Normocephalic and atraumatic.   Right Ear: External ear normal.   Left Ear: External ear normal.   Mouth/Throat: No oropharyngeal exudate.   Eyes: Conjunctivae and EOM are normal. Pupils are equal, round, and reactive to light. No scleral icterus.   Neck: " Normal range of motion. Neck supple. No thyromegaly present.   Cardiovascular: Normal rate, regular rhythm and normal heart sounds.    No murmur heard.  Pulmonary/Chest: Effort normal and breath sounds normal. She exhibits no tenderness.   Abdominal: Soft. Bowel sounds are normal.       Right Side Rheumatological Exam     The patient is tender to palpation of the shoulder      Lymphadenopathy:     She has no cervical adenopathy.   Neurological: She is alert and oriented to person, place, and time. She displays normal reflexes. No cranial nerve deficit. She exhibits normal muscle tone. Gait normal.   Skin: Skin is warm and dry. No rash noted.     Musculoskeletal: Normal range of motion. She exhibits tenderness. She exhibits no edema.   Limited right lateral rotation about 75° with pain  Left lateral rotation about 85 to green minimal pain  Normal extension and flexion  Right upper neck tenderness to palpation over the paraspinous muscles, levator scapula and trapezius muscle  Normal shoulder range of motion bilaterally normal   Right has  some mild tenderness to palpation right biceps tendon some mild crepitus in the right shoulder  No synovitis On exam today   no muscle weakness  Strength 5/5 to all measures flexors and extensors  Reflexes are normal, no hyperreflexia or clonus noted            There is currently no information documented on the homunculus. Go to the Rheumatology activity and complete the homunculus joint exam.        Recent Results (from the past 336 hour(s))   C-reactive protein    Collection Time: 03/09/20  3:16 PM   Result Value Ref Range    CRP 0.3 0.0 - 8.2 mg/L   Comprehensive metabolic panel    Collection Time: 03/09/20  3:16 PM   Result Value Ref Range    Sodium 142 136 - 145 mmol/L    Potassium 4.1 3.5 - 5.1 mmol/L    Chloride 105 95 - 110 mmol/L    CO2 29 23 - 29 mmol/L    Glucose 110 70 - 110 mg/dL    BUN, Bld 27 (H) 6 - 20 mg/dL    Creatinine 0.8 0.5 - 1.4 mg/dL    Calcium 9.6 8.7 -  10.5 mg/dL    Total Protein 6.8 6.0 - 8.4 g/dL    Albumin 4.1 3.5 - 5.2 g/dL    Total Bilirubin 0.6 0.1 - 1.0 mg/dL    Alkaline Phosphatase 66 55 - 135 U/L    AST 25 10 - 40 U/L    ALT 21 10 - 44 U/L    Anion Gap 8 8 - 16 mmol/L    eGFR if African American >60 >60 mL/min/1.73 m^2    eGFR if non African American >60 >60 mL/min/1.73 m^2   CBC auto differential    Collection Time: 03/09/20  3:16 PM   Result Value Ref Range    WBC 4.25 3.90 - 12.70 K/uL    RBC 4.45 4.00 - 5.40 M/uL    Hemoglobin 14.3 12.0 - 16.0 g/dL    Hematocrit 42.7 37.0 - 48.5 %    Mean Corpuscular Volume 96 82 - 98 fL    Mean Corpuscular Hemoglobin 32.1 (H) 27.0 - 31.0 pg    Mean Corpuscular Hemoglobin Conc 33.5 32.0 - 36.0 g/dL    RDW 13.2 11.5 - 14.5 %    Platelets 249 150 - 350 K/uL    MPV 9.9 9.2 - 12.9 fL    Immature Granulocytes 0.2 0.0 - 0.5 %    Gran # (ANC) 2.1 1.8 - 7.7 K/uL    Immature Grans (Abs) 0.01 0.00 - 0.04 K/uL    Lymph # 1.5 1.0 - 4.8 K/uL    Mono # 0.4 0.3 - 1.0 K/uL    Eos # 0.3 0.0 - 0.5 K/uL    Baso # 0.05 0.00 - 0.20 K/uL    nRBC 0 0 /100 WBC    Gran% 48.1 38.0 - 73.0 %    Lymph% 34.4 18.0 - 48.0 %    Mono% 9.9 4.0 - 15.0 %    Eosinophil% 6.4 0.0 - 8.0 %    Basophil% 1.2 0.0 - 1.9 %    Differential Method Automated      Lab Results   Component Value Date    HEPBCAB Negative 04/16/2019    HEPCAB Negative 10/10/2013       No results found for: TBGOLDPLUS       Ref. Range 7/18/2016 14:25 4/4/2017 17:25 2/21/2018 14:15 9/25/2018 07:35 8/19/2019 15:39   CCP Antibodies Latest Ref Range: <5.0 U/mL 107.4 (H) 163.6 (H) 125.7 (H) 115.2 (H) 270.5 (H)          Ref. Range 4/15/2014 13:30 3/31/2016 17:24 4/16/2019 14:18   Hep B Core Total Ab Unknown   Negative   Hep B S Ab Unknown   Negative   Hepatitis B Surface Ag Unknown   Negative   NIL Latest Units: IU/mL 0.03     Quantiferon Gold TB Latest Ref Range: Negative  Negative     TB-Nil Latest Units: IU/mL <0.00           MRI Cervical Spine Without Contrast  Narrative: EXAMINATION:  MRI  CERVICAL SPINE WITHOUT CONTRAST    CLINICAL HISTORY:  Neck pain, prior xray, abn neuro exam;anterilolisthesis, abnormal x-ray suggesting instability;.  Cervicalgia    TECHNIQUE:  Multiplanar, multisequence MR images of the cervical spine were acquired without the administration of contrast.    COMPARISON:  MRI cervical spine from October 2013. radiographs from 08/21/2019    FINDINGS:  Visualized posterior fossa is intact.  The cord demonstrates normal signal intensity.  A vertebral body hemangioma is seen within C7.  This is unchanged.  No acute fracture.  No marrow edema.    C2-C3: Unremarkable    C3-C4: There is mild uncovertebral and facet arthropathy with mild bilateral neural foraminal narrowing which appears greater on the left.    C4-C5: There is uncovertebral hypertrophy which is greater on the left.  No significant spinal canal or neural foraminal stenosis.    C5-C6: A small left perineural cyst is again noted, unchanged.  No spinal canal stenosis.  Right neural foramen appears normal.    C6-C7: Facet arthropathy.  Mild uncovertebral hypertrophy.  Bilateral perineural cysts are again seen, unchanged.  Neural foramina otherwise appear normal.  Mild uncovertebral and facet arthropathy.  Spinal canal is maintained.    C7-T1: Small right perineural cyst again noted.  Neural foramina otherwise appears normal.  Mild facet arthropathy.  No spinal canal stenosis.  Impression: Overall, no substantial change since MRI from 10/18/2013.  Please see above for details.    Electronically signed by: Emeterio Beebe MD  Date:    08/23/2019  Time:    09:23               Assessment:       1. Rheumatoid arthritis involving multiple sites with positive rheumatoid factor    2. Immunocompromised    3. Cervical paraspinous muscle spasm    4. High risk medication use          Impression:  Seropositive rheumatoid arthritis -currently on orencia 125mg SQ every 28 days     EDDY-28 (CRP): Not documented  CDAI 3, BHASKAR 0    oral methotrexate  because of side effects  In the past failed methotrexate monotherapy, failed Humira, Enbrel and Xeljanz    Immunocompromised:  Vaccines up-to-date      Medication monitoring with no toxicity side effects, chronic immunosuppression with no recurrent infections        Neck pain - muscle spasm - djd/facet arthritis - doing better with at home traction, pt, chiropractor care  C-spine X-ray possible instability, mri neg for instability, pt seen by Neurosurgery, cleared for PT         Plan:         C/w orencia Q 28 days   No need to add dmard  RA controlled       Add back mobic Q am for now  Baclofen 10 mg at bedtime; heat and stretching  Get otc salanpas w/lidocaine patch apply to upper back/neck and right shoulder region  See what ortho thinks  May need to get back to spine and consider MBB/facet injections       C/w chiropractor, pt and at home traction   ergonomics and posture       Tb and hep labs up to date     Declining flu vaccine     X-ray hand and feet every 1-2 yrs   Topical Voltaren gel  4 g qid prn   rtc 3-4 months with labs reg 4

## 2020-03-10 DIAGNOSIS — M05.79 RHEUMATOID ARTHRITIS INVOLVING MULTIPLE SITES WITH POSITIVE RHEUMATOID FACTOR: Chronic | ICD-10-CM

## 2020-03-10 LAB — CCP AB SER IA-ACNC: 318.5 U/ML

## 2020-03-25 ENCOUNTER — PATIENT MESSAGE (OUTPATIENT)
Dept: PHYSICAL MEDICINE AND REHAB | Facility: CLINIC | Age: 50
End: 2020-03-25

## 2020-03-25 NOTE — TELEPHONE ENCOUNTER
Was able to walk pt threw on how to find result in the my chart josh all questions answered at this time//dp

## 2020-04-07 ENCOUNTER — PATIENT MESSAGE (OUTPATIENT)
Dept: RHEUMATOLOGY | Facility: CLINIC | Age: 50
End: 2020-04-07

## 2020-04-07 DIAGNOSIS — M62.838 CERVICAL PARASPINOUS MUSCLE SPASM: ICD-10-CM

## 2020-04-07 DIAGNOSIS — M79.2 NEUROPATHIC PAIN: Primary | ICD-10-CM

## 2020-04-07 DIAGNOSIS — M54.12 CERVICAL RADICULITIS: ICD-10-CM

## 2020-04-07 RX ORDER — BACLOFEN 10 MG/1
TABLET ORAL
Qty: 30 TABLET | Refills: 1 | Status: SHIPPED | OUTPATIENT
Start: 2020-04-07 | End: 2020-05-11 | Stop reason: SDUPTHER

## 2020-04-07 RX ORDER — GABAPENTIN 300 MG/1
300 CAPSULE ORAL 2 TIMES DAILY
Qty: 60 CAPSULE | Refills: 3 | Status: SHIPPED | OUTPATIENT
Start: 2020-04-07 | End: 2020-07-29

## 2020-04-08 ENCOUNTER — PATIENT MESSAGE (OUTPATIENT)
Dept: RHEUMATOLOGY | Facility: CLINIC | Age: 50
End: 2020-04-08

## 2020-04-08 DIAGNOSIS — M05.79 RHEUMATOID ARTHRITIS INVOLVING MULTIPLE SITES WITH POSITIVE RHEUMATOID FACTOR: Primary | Chronic | ICD-10-CM

## 2020-04-08 RX ORDER — METHYLPREDNISOLONE 4 MG/1
TABLET ORAL
Qty: 1 PACKAGE | Refills: 0 | Status: SHIPPED | OUTPATIENT
Start: 2020-04-08 | End: 2020-05-01 | Stop reason: SDUPTHER

## 2020-05-01 ENCOUNTER — PATIENT MESSAGE (OUTPATIENT)
Dept: RHEUMATOLOGY | Facility: CLINIC | Age: 50
End: 2020-05-01

## 2020-05-01 DIAGNOSIS — M05.79 RHEUMATOID ARTHRITIS INVOLVING MULTIPLE SITES WITH POSITIVE RHEUMATOID FACTOR: Chronic | ICD-10-CM

## 2020-05-01 RX ORDER — METHYLPREDNISOLONE 4 MG/1
TABLET ORAL
Qty: 1 PACKAGE | Refills: 0 | Status: SHIPPED | OUTPATIENT
Start: 2020-05-01 | End: 2020-07-29 | Stop reason: ALTCHOICE

## 2020-05-07 ENCOUNTER — PATIENT MESSAGE (OUTPATIENT)
Dept: RHEUMATOLOGY | Facility: CLINIC | Age: 50
End: 2020-05-07

## 2020-05-07 DIAGNOSIS — M62.838 CERVICAL PARASPINOUS MUSCLE SPASM: ICD-10-CM

## 2020-05-11 ENCOUNTER — PATIENT MESSAGE (OUTPATIENT)
Dept: RHEUMATOLOGY | Facility: CLINIC | Age: 50
End: 2020-05-11

## 2020-05-11 RX ORDER — BACLOFEN 10 MG/1
TABLET ORAL
Qty: 30 TABLET | Refills: 3 | Status: SHIPPED | OUTPATIENT
Start: 2020-05-11 | End: 2020-12-02

## 2020-06-10 ENCOUNTER — LAB VISIT (OUTPATIENT)
Dept: LAB | Facility: HOSPITAL | Age: 50
End: 2020-06-10
Attending: PHYSICIAN ASSISTANT
Payer: COMMERCIAL

## 2020-06-10 ENCOUNTER — TELEPHONE (OUTPATIENT)
Dept: RHEUMATOLOGY | Facility: CLINIC | Age: 50
End: 2020-06-10

## 2020-06-10 ENCOUNTER — OFFICE VISIT (OUTPATIENT)
Dept: RHEUMATOLOGY | Facility: CLINIC | Age: 50
End: 2020-06-10
Payer: COMMERCIAL

## 2020-06-10 VITALS
BODY MASS INDEX: 22.88 KG/M2 | DIASTOLIC BLOOD PRESSURE: 70 MMHG | WEIGHT: 145.75 LBS | HEART RATE: 64 BPM | HEIGHT: 67 IN | SYSTOLIC BLOOD PRESSURE: 108 MMHG

## 2020-06-10 DIAGNOSIS — M05.79 RHEUMATOID ARTHRITIS INVOLVING MULTIPLE SITES WITH POSITIVE RHEUMATOID FACTOR: ICD-10-CM

## 2020-06-10 DIAGNOSIS — G89.29 CHRONIC PAIN OF BOTH SHOULDERS: ICD-10-CM

## 2020-06-10 DIAGNOSIS — M25.512 CHRONIC PAIN OF BOTH SHOULDERS: ICD-10-CM

## 2020-06-10 DIAGNOSIS — R93.7 ABNORMAL X-RAY OF CERVICAL SPINE: ICD-10-CM

## 2020-06-10 DIAGNOSIS — D84.9 IMMUNOCOMPROMISED: ICD-10-CM

## 2020-06-10 DIAGNOSIS — M54.12 CERVICAL RADICULITIS: ICD-10-CM

## 2020-06-10 DIAGNOSIS — M25.511 CHRONIC PAIN OF BOTH SHOULDERS: ICD-10-CM

## 2020-06-10 DIAGNOSIS — M05.79 RHEUMATOID ARTHRITIS INVOLVING MULTIPLE SITES WITH POSITIVE RHEUMATOID FACTOR: Primary | ICD-10-CM

## 2020-06-10 DIAGNOSIS — Z79.899 HIGH RISK MEDICATION USE: ICD-10-CM

## 2020-06-10 LAB
ALBUMIN SERPL BCP-MCNC: 4.2 G/DL (ref 3.5–5.2)
ALP SERPL-CCNC: 83 U/L (ref 55–135)
ALT SERPL W/O P-5'-P-CCNC: 18 U/L (ref 10–44)
ANION GAP SERPL CALC-SCNC: 8 MMOL/L (ref 8–16)
AST SERPL-CCNC: 22 U/L (ref 10–40)
BASOPHILS # BLD AUTO: 0.08 K/UL (ref 0–0.2)
BASOPHILS NFR BLD: 1.4 % (ref 0–1.9)
BILIRUB SERPL-MCNC: 0.4 MG/DL (ref 0.1–1)
BUN SERPL-MCNC: 22 MG/DL (ref 6–20)
CALCIUM SERPL-MCNC: 9.4 MG/DL (ref 8.7–10.5)
CHLORIDE SERPL-SCNC: 108 MMOL/L (ref 95–110)
CO2 SERPL-SCNC: 27 MMOL/L (ref 23–29)
CREAT SERPL-MCNC: 0.9 MG/DL (ref 0.5–1.4)
CRP SERPL-MCNC: 0.5 MG/L (ref 0–8.2)
DIFFERENTIAL METHOD: ABNORMAL
EOSINOPHIL # BLD AUTO: 0.3 K/UL (ref 0–0.5)
EOSINOPHIL NFR BLD: 5.6 % (ref 0–8)
ERYTHROCYTE [DISTWIDTH] IN BLOOD BY AUTOMATED COUNT: 12.2 % (ref 11.5–14.5)
ERYTHROCYTE [SEDIMENTATION RATE] IN BLOOD BY WESTERGREN METHOD: 3 MM/HR (ref 0–36)
EST. GFR  (AFRICAN AMERICAN): >60 ML/MIN/1.73 M^2
EST. GFR  (NON AFRICAN AMERICAN): >60 ML/MIN/1.73 M^2
GLUCOSE SERPL-MCNC: 111 MG/DL (ref 70–110)
HCT VFR BLD AUTO: 42.5 % (ref 37–48.5)
HGB BLD-MCNC: 13.9 G/DL (ref 12–16)
IMM GRANULOCYTES # BLD AUTO: 0.01 K/UL (ref 0–0.04)
IMM GRANULOCYTES NFR BLD AUTO: 0.2 % (ref 0–0.5)
LYMPHOCYTES # BLD AUTO: 1.8 K/UL (ref 1–4.8)
LYMPHOCYTES NFR BLD: 31.8 % (ref 18–48)
MCH RBC QN AUTO: 31.2 PG (ref 27–31)
MCHC RBC AUTO-ENTMCNC: 32.7 G/DL (ref 32–36)
MCV RBC AUTO: 95 FL (ref 82–98)
MONOCYTES # BLD AUTO: 0.5 K/UL (ref 0.3–1)
MONOCYTES NFR BLD: 9.1 % (ref 4–15)
NEUTROPHILS # BLD AUTO: 3 K/UL (ref 1.8–7.7)
NEUTROPHILS NFR BLD: 52.1 % (ref 38–73)
NRBC BLD-RTO: 0 /100 WBC
PLATELET # BLD AUTO: 273 K/UL (ref 150–350)
PMV BLD AUTO: 10 FL (ref 9.2–12.9)
POTASSIUM SERPL-SCNC: 4.3 MMOL/L (ref 3.5–5.1)
PROT SERPL-MCNC: 6.9 G/DL (ref 6–8.4)
RBC # BLD AUTO: 4.46 M/UL (ref 4–5.4)
SODIUM SERPL-SCNC: 143 MMOL/L (ref 136–145)
WBC # BLD AUTO: 5.73 K/UL (ref 3.9–12.7)

## 2020-06-10 PROCEDURE — 99999 PR PBB SHADOW E&M-EST. PATIENT-LVL IV: ICD-10-PCS | Mod: PBBFAC,,, | Performed by: PHYSICIAN ASSISTANT

## 2020-06-10 PROCEDURE — 99999 PR PBB SHADOW E&M-EST. PATIENT-LVL IV: CPT | Mod: PBBFAC,,, | Performed by: PHYSICIAN ASSISTANT

## 2020-06-10 PROCEDURE — 3008F PR BODY MASS INDEX (BMI) DOCUMENTED: ICD-10-PCS | Mod: CPTII,S$GLB,, | Performed by: PHYSICIAN ASSISTANT

## 2020-06-10 PROCEDURE — 99214 OFFICE O/P EST MOD 30 MIN: CPT | Mod: 25,S$GLB,, | Performed by: PHYSICIAN ASSISTANT

## 2020-06-10 PROCEDURE — 86140 C-REACTIVE PROTEIN: CPT

## 2020-06-10 PROCEDURE — 36415 COLL VENOUS BLD VENIPUNCTURE: CPT

## 2020-06-10 PROCEDURE — 20610 DRAIN/INJ JOINT/BURSA W/O US: CPT | Mod: 50,S$GLB,, | Performed by: PHYSICIAN ASSISTANT

## 2020-06-10 PROCEDURE — 85025 COMPLETE CBC W/AUTO DIFF WBC: CPT

## 2020-06-10 PROCEDURE — 80053 COMPREHEN METABOLIC PANEL: CPT

## 2020-06-10 PROCEDURE — 3008F BODY MASS INDEX DOCD: CPT | Mod: CPTII,S$GLB,, | Performed by: PHYSICIAN ASSISTANT

## 2020-06-10 PROCEDURE — 99214 PR OFFICE/OUTPT VISIT, EST, LEVL IV, 30-39 MIN: ICD-10-PCS | Mod: 25,S$GLB,, | Performed by: PHYSICIAN ASSISTANT

## 2020-06-10 PROCEDURE — 20610 PR DRAIN/INJECT LARGE JOINT/BURSA: ICD-10-PCS | Mod: 50,S$GLB,, | Performed by: PHYSICIAN ASSISTANT

## 2020-06-10 PROCEDURE — 85652 RBC SED RATE AUTOMATED: CPT

## 2020-06-10 RX ORDER — TRIAMCINOLONE ACETONIDE 40 MG/ML
80 INJECTION, SUSPENSION INTRA-ARTICULAR; INTRAMUSCULAR
Status: COMPLETED | OUTPATIENT
Start: 2020-06-10 | End: 2020-06-10

## 2020-06-10 RX ORDER — PREDNISONE 10 MG/1
TABLET ORAL
Qty: 60 TABLET | Refills: 0 | Status: SHIPPED | OUTPATIENT
Start: 2020-06-10 | End: 2020-07-29

## 2020-06-10 RX ORDER — HYDROCODONE BITARTRATE AND ACETAMINOPHEN 7.5; 325 MG/1; MG/1
1 TABLET ORAL EVERY 6 HOURS PRN
Qty: 30 TABLET | Refills: 0 | Status: SHIPPED | OUTPATIENT
Start: 2020-06-10 | End: 2021-10-08

## 2020-06-10 RX ADMIN — TRIAMCINOLONE ACETONIDE 80 MG: 40 INJECTION, SUSPENSION INTRA-ARTICULAR; INTRAMUSCULAR at 01:06

## 2020-06-10 NOTE — TELEPHONE ENCOUNTER
----- Message from Liat Alvarenga sent at 6/10/2020  9:20 AM CDT -----  Contact: Pt   Type:  Same Day Appointment Request    Caller is requesting a same day appointment.  Caller declined first available appointment listed below.    Name of Caller:Elizabeth Johns  When is the first available appointment?06/11/20  Symptoms:flare up  Best Call Back Number:[phone] (home)   Additional Information:

## 2020-06-10 NOTE — TELEPHONE ENCOUNTER
RA flare up, bilateral shoulder pain. Cant sleep. sxs x months. Had MRI with Dr Geronimo of shoulder wnl. Pain scale 10    Appt given for Ramila today at 1:00

## 2020-06-10 NOTE — PROGRESS NOTES
Subjective:       Patient ID: Elizabeth Johns is a 50 y.o. female.    Chief Complaint: Rheumatoid Arthritis (severe shoulder pain)    Elizabeth is here today in sig pain, seen by sukhi 12 wks ago.  C/o sig shoulder pain meenakshi keeping her from sleeping, pain has been present for months.  She had a medrol dose pack early may for hand pain/swelling. She took the steroid which helped the hand pain but didn't help the shoulder pain.  She did not tolerate gabapentin, or lyrica.  Her pain is meenakshi shoulders, tender to touch laterally, she c/o burning pain as well.  She can't sleep at night, she is miserable and tearful.  The only thing that has helped her pain in the past was injections.  Pain is rated 8/10.    She has recently seen ortho for shoulder pain and had MRI done which was normal per patient but we dont have report. Was sent to spine, She had an ISIDORO in her cspine last Tuesday, her pain has not subsided    She has seropositive (+ccp) nonerosive rheumatoid arthritis. She has been on orencia for several years. Her Orencia injections she pushed last dose out by two months.     Last year Her ccp ab was rechecked and has increased. This concerns her-     She is not on oral dmard meds. she is taking cbd oil daily, turmeric, iron red krill, boswelia, juice plus,  thinks it has helped keeping her healthy     She has chronic neck and shoulder issues,  MRI cspine showed djd/ddd facet arthropathy (no instability), gabino c7 area, she had done dry needling and seen chiropractor, she has had several injections in her shoulder.      Failed mtx monotherapy in the past. Off mtx due to side effects (fatigue and nausea). She is not on prednisone. In the past failed humira, enbrel and xeljanz these just did not help  Uses pennsaid topical bid prn. High titers CCP positive but sedimentation rate and CRP have been normal.  Rheumatoid factor negative.                   She reports no joint swelling. Pertinent negatives include no dysuria,  "fatigue, fever, trouble swallowing, myalgias or headaches.         Review of Systems   Constitutional: Negative.  Negative for activity change, appetite change, chills, fatigue and fever.   HENT: Negative.  Negative for mouth sores and trouble swallowing.         No dry mouth   Eyes: Negative.  Negative for photophobia, pain and redness.        No swollen or red eyes, no dry eye     Respiratory: Negative.  Negative for chest tightness, shortness of breath, wheezing and stridor.    Cardiovascular: Negative.  Negative for chest pain.   Gastrointestinal: Negative.  Negative for abdominal pain, blood in stool, diarrhea, nausea and vomiting.   Genitourinary: Negative.  Negative for dysuria, frequency, hematuria and urgency.   Musculoskeletal: Negative for arthralgias, back pain, gait problem, joint swelling, myalgias, neck pain and neck stiffness.   Skin: Negative.  Negative for color change, pallor and rash.   Neurological: Negative.  Negative for weakness and headaches.   Hematological: Negative for adenopathy.   Psychiatric/Behavioral: Negative for suicidal ideas.         Objective:     Past Medical History:   Diagnosis Date    Arthritis     Rheumatoid        Immunization History   Administered Date(s) Administered    DTaP 06/26/2009    Influenza - High Dose - PF (65 years and older) 11/09/2016, 11/14/2017, 11/21/2018    Influenza - Quadrivalent 11/05/2014, 10/19/2015    Influenza Split 10/10/2013    Pneumococcal Conjugate - 13 Valent 12/19/2014    Pneumococcal Polysaccharide - 23 Valent 12/02/2015    Tdap 07/21/2016    Zoster 10/10/2013       /70   Pulse 64   Ht 5' 7" (1.702 m)   Wt 66.1 kg (145 lb 11.6 oz)   BMI 22.82 kg/m²      Physical Exam   Constitutional: She is oriented to person, place, and time and well-developed, well-nourished, and in no distress. No distress.   HENT:   Head: Normocephalic and atraumatic.   Right Ear: External ear normal.   Left Ear: External ear normal.   Mouth/Throat: " No oropharyngeal exudate.   Eyes: Conjunctivae and EOM are normal. Pupils are equal, round, and reactive to light. No scleral icterus.   Neck: Normal range of motion. Neck supple. No thyromegaly present.   Cardiovascular: Normal rate, regular rhythm and normal heart sounds.    No murmur heard.  Pulmonary/Chest: Effort normal and breath sounds normal. She exhibits no tenderness.   Abdominal: Soft. Bowel sounds are normal.       Right Side Rheumatological Exam     The patient is tender to palpation of the shoulder      Lymphadenopathy:     She has no cervical adenopathy.   Neurological: She is alert and oriented to person, place, and time. She displays normal reflexes. No cranial nerve deficit. She exhibits normal muscle tone. Gait normal.   Skin: Skin is warm and dry. No rash noted.     Musculoskeletal: Normal range of motion. She exhibits tenderness. She exhibits no edema.   Neck w   Normal extension and flexion  Right upper neck tenderness to palpation over the paraspinous muscles, levator scapula and trapezius muscle  Normal shoulder range of motion bilaterally normal   Right has  some mild tenderness to palpation right biceps tendon and lateral shoulder some mild crepitus in the right shoulder, strength 3/5 d/t pain with resisted ff meenakshi shoulders  L shoulder tender lat over sab, full rom   No syonvitis seen on exam   no muscle weakness                There is currently no information documented on the homunculus. Go to the Rheumatology activity and complete the homunculus joint exam.        No results found for this or any previous visit (from the past 336 hour(s)).  Lab Results   Component Value Date    HEPBCAB Negative 04/16/2019    HEPCAB Negative 10/10/2013       No results found for: TBGOLDPLUS       Ref. Range 7/18/2016 14:25 4/4/2017 17:25 2/21/2018 14:15 9/25/2018 07:35 8/19/2019 15:39   CCP Antibodies Latest Ref Range: <5.0 U/mL 107.4 (H) 163.6 (H) 125.7 (H) 115.2 (H) 270.5 (H)          Ref. Range  4/15/2014 13:30 3/31/2016 17:24 4/16/2019 14:18   Hep B Core Total Ab Unknown   Negative   Hep B S Ab Unknown   Negative   Hepatitis B Surface Ag Unknown   Negative   NIL Latest Units: IU/mL 0.03     Quantiferon Gold TB Latest Ref Range: Negative  Negative     TB-Nil Latest Units: IU/mL <0.00           MRI Cervical Spine Without Contrast  Narrative: EXAMINATION:  MRI CERVICAL SPINE WITHOUT CONTRAST    CLINICAL HISTORY:  Neck pain, prior xray, abn neuro exam;anterilolisthesis, abnormal x-ray suggesting instability;.  Cervicalgia    TECHNIQUE:  Multiplanar, multisequence MR images of the cervical spine were acquired without the administration of contrast.    COMPARISON:  MRI cervical spine from October 2013. radiographs from 08/21/2019    FINDINGS:  Visualized posterior fossa is intact.  The cord demonstrates normal signal intensity.  A vertebral body hemangioma is seen within C7.  This is unchanged.  No acute fracture.  No marrow edema.    C2-C3: Unremarkable    C3-C4: There is mild uncovertebral and facet arthropathy with mild bilateral neural foraminal narrowing which appears greater on the left.    C4-C5: There is uncovertebral hypertrophy which is greater on the left.  No significant spinal canal or neural foraminal stenosis.    C5-C6: A small left perineural cyst is again noted, unchanged.  No spinal canal stenosis.  Right neural foramen appears normal.    C6-C7: Facet arthropathy.  Mild uncovertebral hypertrophy.  Bilateral perineural cysts are again seen, unchanged.  Neural foramina otherwise appear normal.  Mild uncovertebral and facet arthropathy.  Spinal canal is maintained.    C7-T1: Small right perineural cyst again noted.  Neural foramina otherwise appears normal.  Mild facet arthropathy.  No spinal canal stenosis.  Impression: Overall, no substantial change since MRI from 10/18/2013.  Please see above for details.    Electronically signed by: Emeterio Beebe MD  Date:    08/23/2019  Time:    09:23                Assessment:       1. Rheumatoid arthritis involving multiple sites with positive rheumatoid factor    2. Cervical radiculitis    3. Immunocompromised    4. High risk medication use    5. Abnormal x-ray of cervical spine    6. Chronic pain of both shoulders          Impression:  Seropositive rheumatoid arthritis -currently on orencia 125mg SQ every 28 days last dose 2 months ago   Off oral methotrexate because of side effects (nausea/fatigue)  In the past failed methotrexate monotherapy, failed Humira, Enbrel and Xeljanz  -unclear if RA activity, esr/crp have been normal, no other joints affected, no obvious synoviits on exam, not taking orencia weekly     Immunocompromised:  Vaccines up-to-date      Medication monitoring with no toxicity side effects, chronic immunosuppression with no recurrent infections      Neck pain - muscle spasm - djd/facet arthritis - major contributor to her shoulder pain and symptoms with neuropathic component s/p ISIDORO last week no improvement.   C-spine X-ray possible instability, mri neg for instability, seeing spine MD     Chronic shoulder pain: with h/o mult injections, seen by ortho yesterday without sig findings on MRI        Plan:       Increase orencia to weekly 125mg SC  Inject meenakshi shoulders as below   Oral pred taper to see if her shoulders respond  Consider adding back dmard, maybe arava vs mtx with phenergan and mucinex dm  Check esr/crp today   May need to get back to spine and consider MBB/facet injections      Send us mri of shoulder report     contBaclofen 10 mg at bedtime; heat and stretching       Tb and hep labs up to date      X-ray hand and feet every 1-2 yrs   Topical Voltaren gel  4 g qid prn     Has visit with sukhi in a month, keep this visit     Procedure note: After verbal consent was obtained.  The right shoulder was prepared with sterile prep.  The skin was anesthetized with 1% ethyl chloride.  The shoulder joint was injected with 2.5 cc of 1% lidocaine to  anesthetize the joint.  The shoulder joint was then injected with 40mg kenalog  and 1.0 mL of 1% lidocaine.  Hemostasis was obtained.  The patient tolerated procedure well with no complications.      Procedure note: After verbal consent was obtained.  The left shoulder was prepared with sterile prep.  The skin was anesthetized with 1% ethyl chloride.  The shoulder joint was injected with 2.5 cc of 1% lidocaine to anesthetize the joint.  The shoulder joint was then injected with 40mg kenalog  and 1.0 mL of 1% lidocaine.  Hemostasis was obtained.  The patient tolerated procedure well with no complications.  Patient discharged with icing instructions

## 2020-07-29 ENCOUNTER — LAB VISIT (OUTPATIENT)
Dept: LAB | Facility: HOSPITAL | Age: 50
End: 2020-07-29
Attending: PHYSICIAN ASSISTANT
Payer: COMMERCIAL

## 2020-07-29 ENCOUNTER — OFFICE VISIT (OUTPATIENT)
Dept: RHEUMATOLOGY | Facility: CLINIC | Age: 50
End: 2020-07-29
Payer: COMMERCIAL

## 2020-07-29 VITALS
SYSTOLIC BLOOD PRESSURE: 99 MMHG | DIASTOLIC BLOOD PRESSURE: 57 MMHG | BODY MASS INDEX: 23.22 KG/M2 | HEART RATE: 76 BPM | WEIGHT: 147.94 LBS | HEIGHT: 67 IN

## 2020-07-29 DIAGNOSIS — M05.79 RHEUMATOID ARTHRITIS INVOLVING MULTIPLE SITES WITH POSITIVE RHEUMATOID FACTOR: Primary | Chronic | ICD-10-CM

## 2020-07-29 DIAGNOSIS — Z79.899 HIGH RISK MEDICATION USE: ICD-10-CM

## 2020-07-29 DIAGNOSIS — Z51.81 MEDICATION MONITORING ENCOUNTER: Chronic | ICD-10-CM

## 2020-07-29 DIAGNOSIS — D84.9 IMMUNOCOMPROMISED: ICD-10-CM

## 2020-07-29 DIAGNOSIS — M05.79 RHEUMATOID ARTHRITIS INVOLVING MULTIPLE SITES WITH POSITIVE RHEUMATOID FACTOR: Chronic | ICD-10-CM

## 2020-07-29 LAB
ALBUMIN SERPL BCP-MCNC: 4.2 G/DL (ref 3.5–5.2)
ALP SERPL-CCNC: 91 U/L (ref 55–135)
ALT SERPL W/O P-5'-P-CCNC: 20 U/L (ref 10–44)
ANION GAP SERPL CALC-SCNC: 11 MMOL/L (ref 8–16)
AST SERPL-CCNC: 20 U/L (ref 10–40)
BASOPHILS # BLD AUTO: 0.06 K/UL (ref 0–0.2)
BASOPHILS NFR BLD: 1.1 % (ref 0–1.9)
BILIRUB SERPL-MCNC: 0.3 MG/DL (ref 0.1–1)
BUN SERPL-MCNC: 27 MG/DL (ref 6–20)
CALCIUM SERPL-MCNC: 9.5 MG/DL (ref 8.7–10.5)
CHLORIDE SERPL-SCNC: 103 MMOL/L (ref 95–110)
CO2 SERPL-SCNC: 26 MMOL/L (ref 23–29)
CREAT SERPL-MCNC: 1.1 MG/DL (ref 0.5–1.4)
CRP SERPL-MCNC: 0.9 MG/L (ref 0–8.2)
DIFFERENTIAL METHOD: ABNORMAL
EOSINOPHIL # BLD AUTO: 0.2 K/UL (ref 0–0.5)
EOSINOPHIL NFR BLD: 3.1 % (ref 0–8)
ERYTHROCYTE [DISTWIDTH] IN BLOOD BY AUTOMATED COUNT: 12.4 % (ref 11.5–14.5)
ERYTHROCYTE [SEDIMENTATION RATE] IN BLOOD BY WESTERGREN METHOD: 3 MM/HR (ref 0–36)
EST. GFR  (AFRICAN AMERICAN): >60 ML/MIN/1.73 M^2
EST. GFR  (NON AFRICAN AMERICAN): 58 ML/MIN/1.73 M^2
GLUCOSE SERPL-MCNC: 114 MG/DL (ref 70–110)
HCT VFR BLD AUTO: 40.6 % (ref 37–48.5)
HGB BLD-MCNC: 13.8 G/DL (ref 12–16)
IMM GRANULOCYTES # BLD AUTO: 0.01 K/UL (ref 0–0.04)
IMM GRANULOCYTES NFR BLD AUTO: 0.2 % (ref 0–0.5)
LYMPHOCYTES # BLD AUTO: 2 K/UL (ref 1–4.8)
LYMPHOCYTES NFR BLD: 38.2 % (ref 18–48)
MCH RBC QN AUTO: 32.1 PG (ref 27–31)
MCHC RBC AUTO-ENTMCNC: 34 G/DL (ref 32–36)
MCV RBC AUTO: 94 FL (ref 82–98)
MONOCYTES # BLD AUTO: 0.4 K/UL (ref 0.3–1)
MONOCYTES NFR BLD: 6.9 % (ref 4–15)
NEUTROPHILS # BLD AUTO: 2.7 K/UL (ref 1.8–7.7)
NEUTROPHILS NFR BLD: 50.7 % (ref 38–73)
NRBC BLD-RTO: 0 /100 WBC
PLATELET # BLD AUTO: 246 K/UL (ref 150–350)
PMV BLD AUTO: 9.9 FL (ref 9.2–12.9)
POTASSIUM SERPL-SCNC: 4.3 MMOL/L (ref 3.5–5.1)
PROT SERPL-MCNC: 6.8 G/DL (ref 6–8.4)
RBC # BLD AUTO: 4.3 M/UL (ref 4–5.4)
SODIUM SERPL-SCNC: 140 MMOL/L (ref 136–145)
WBC # BLD AUTO: 5.24 K/UL (ref 3.9–12.7)

## 2020-07-29 PROCEDURE — 80053 COMPREHEN METABOLIC PANEL: CPT

## 2020-07-29 PROCEDURE — 99999 PR PBB SHADOW E&M-EST. PATIENT-LVL III: CPT | Mod: PBBFAC,,, | Performed by: PHYSICIAN ASSISTANT

## 2020-07-29 PROCEDURE — 99999 PR PBB SHADOW E&M-EST. PATIENT-LVL III: ICD-10-PCS | Mod: PBBFAC,,, | Performed by: PHYSICIAN ASSISTANT

## 2020-07-29 PROCEDURE — 36415 COLL VENOUS BLD VENIPUNCTURE: CPT

## 2020-07-29 PROCEDURE — 3008F PR BODY MASS INDEX (BMI) DOCUMENTED: ICD-10-PCS | Mod: CPTII,S$GLB,, | Performed by: PHYSICIAN ASSISTANT

## 2020-07-29 PROCEDURE — 99214 PR OFFICE/OUTPT VISIT, EST, LEVL IV, 30-39 MIN: ICD-10-PCS | Mod: S$GLB,,, | Performed by: PHYSICIAN ASSISTANT

## 2020-07-29 PROCEDURE — 85652 RBC SED RATE AUTOMATED: CPT

## 2020-07-29 PROCEDURE — 85025 COMPLETE CBC W/AUTO DIFF WBC: CPT

## 2020-07-29 PROCEDURE — 3008F BODY MASS INDEX DOCD: CPT | Mod: CPTII,S$GLB,, | Performed by: PHYSICIAN ASSISTANT

## 2020-07-29 PROCEDURE — 99214 OFFICE O/P EST MOD 30 MIN: CPT | Mod: S$GLB,,, | Performed by: PHYSICIAN ASSISTANT

## 2020-07-29 PROCEDURE — 86140 C-REACTIVE PROTEIN: CPT

## 2020-07-29 RX ORDER — MELOXICAM 15 MG/1
15 TABLET ORAL DAILY PRN
Qty: 30 TABLET | Refills: 3 | Status: SHIPPED | OUTPATIENT
Start: 2020-07-29 | End: 2021-05-04

## 2020-07-29 RX ORDER — PREDNISONE 10 MG/1
10 TABLET ORAL 2 TIMES DAILY PRN
Qty: 60 TABLET | Refills: 0 | Status: SHIPPED | OUTPATIENT
Start: 2020-07-29 | End: 2020-08-06 | Stop reason: SDUPTHER

## 2020-07-29 NOTE — PROGRESS NOTES
Subjective:       Patient ID: Elizabeth Johns is a 50 y.o. female.    Chief Complaint: Rheumatoid Arthritis and Osteoarthritis (neck )    Elizabeth is here today Rheum follow up.      She has seropositive (+ccp) nonerosive rheumatoid arthritis. She has been on orencia for several years. Her Orencia injection is every 3 wks currenlty; she ben change the frequency depending on how  RA is doing; sometimes may take weekly for a few weeks then when doing well pushes out to every 3-4 wks      She is not on oral dmard meds. she is taking cbd oil daily, turmeric, iron red krill, boswelia, juice plus   thinks it has helped keeping her healthy     Pain 0/10    Did have right 2nd mcp swelling for a few days bout 6 wks ago, did 3 day short pred taper and issues resolved  Most recently more neck issues- neck right side> Left and pain right shoulder radiating down right arm to elbow  Mri showed ddd/djd and facet arthritis gabino C7 region  Prior x-ray shoulder showed some AC joint; Saw dr moya- ortho provider  He did not thing pain was from shoulder  Working w/chriropractor on ROM, did dry needling  Now also seeing  Dr Emery - had 1 marlena which did help some; will get the 2nd done before to see if can resolve issues if not then next option is nerve ablation    She has mobic to use prn but has not taken in a while, script is out  Has baclofen to use prn for neck spasm  Heat, massage and stetching dose help  From time to time her neck feels stiff then the next morning she wakes up w/more pain and less motion   The past seems to worsen if she does nothing  The meds do help when she takes them     Failed mtx monotherapy in the past. Off mtx due to side effects (fatigue and nausea). She is not on prednisone. In the past failed humira, enbrel and xeljanz these just did not help  Uses pennsaid topical bid prn. High titers CCP positive but sedimentation rate and CRP have been normal.  Rheumatoid factor negative.                   She  "reports no joint swelling. Pertinent negatives include no dysuria, fatigue, fever, trouble swallowing, myalgias or headaches.         Review of Systems   Constitutional: Negative.  Negative for activity change, appetite change, chills, fatigue and fever.   HENT: Negative.  Negative for mouth sores and trouble swallowing.         No dry mouth   Eyes: Negative.  Negative for photophobia, pain and redness.        No swollen or red eyes, no dry eye     Respiratory: Negative.  Negative for chest tightness, shortness of breath, wheezing and stridor.    Cardiovascular: Negative.  Negative for chest pain.   Gastrointestinal: Negative.  Negative for abdominal pain, blood in stool, diarrhea, nausea and vomiting.   Genitourinary: Negative.  Negative for dysuria, frequency, hematuria and urgency.   Musculoskeletal: Positive for neck pain. Negative for arthralgias, back pain, gait problem, joint swelling, myalgias and neck stiffness.   Skin: Negative.  Negative for color change, pallor and rash.   Neurological: Negative.  Negative for weakness and headaches.   Hematological: Negative for adenopathy.   Psychiatric/Behavioral: Negative for suicidal ideas.         Objective:     Past Medical History:   Diagnosis Date    Arthritis     Rheumatoid        Immunization History   Administered Date(s) Administered    DTaP 06/26/2009    Influenza - High Dose - PF (65 years and older) 11/09/2016, 11/14/2017, 11/21/2018    Influenza - Quadrivalent 11/05/2014, 10/19/2015    Influenza Split 10/10/2013    Pneumococcal Conjugate - 13 Valent 12/19/2014    Pneumococcal Polysaccharide - 23 Valent 12/02/2015    Tdap 07/21/2016    Zoster 10/10/2013       BP (!) 99/57   Pulse 76   Ht 5' 7" (1.702 m)   Wt 67.1 kg (147 lb 14.9 oz)   BMI 23.17 kg/m²      Physical Exam   Constitutional: She is oriented to person, place, and time and well-developed, well-nourished, and in no distress. No distress.   HENT:   Head: Normocephalic and atraumatic. "   Right Ear: External ear normal.   Left Ear: External ear normal.   Mouth/Throat: No oropharyngeal exudate.   Eyes: Conjunctivae and EOM are normal. Pupils are equal, round, and reactive to light. No scleral icterus.   Neck: Normal range of motion. Neck supple. No thyromegaly present.   Cardiovascular: Normal rate, regular rhythm and normal heart sounds.    No murmur heard.  Pulmonary/Chest: Effort normal and breath sounds normal. She exhibits no tenderness.   Abdominal: Soft. Bowel sounds are normal.       Right Side Rheumatological Exam     The patient is tender to palpation of the shoulder      Lymphadenopathy:     She has no cervical adenopathy.   Neurological: She is alert and oriented to person, place, and time. She displays normal reflexes. No cranial nerve deficit. She exhibits normal muscle tone. Gait normal.   Skin: Skin is warm and dry. No rash noted.     Musculoskeletal: Normal range of motion. Tenderness present. No edema.      Comments: Limited right lateral rotation about 75° with pain  Left lateral rotation about 85 degrees w/ minimal pain  Normal extension and flexion  Right upper neck tenderness to palpation over the paraspinous muscles, levator scapula and trapezius muscle  Normal shoulder range of motion bilaterally normal   Right has  some mild tenderness to palpation right biceps tendon some mild crepitus in the right shoulder  No synovitis On exam today   no muscle weakness  Strength 5/5 to all measures flexors and extensors  Reflexes are normal, no hyperreflexia or clonus noted    No synovitis                   Recent Results (from the past 336 hour(s))   C-reactive protein    Collection Time: 07/29/20  2:19 PM   Result Value Ref Range    CRP 0.9 0.0 - 8.2 mg/L   Comprehensive metabolic panel    Collection Time: 07/29/20  2:19 PM   Result Value Ref Range    Sodium 140 136 - 145 mmol/L    Potassium 4.3 3.5 - 5.1 mmol/L    Chloride 103 95 - 110 mmol/L    CO2 26 23 - 29 mmol/L    Glucose 114  (H) 70 - 110 mg/dL    BUN, Bld 27 (H) 6 - 20 mg/dL    Creatinine 1.1 0.5 - 1.4 mg/dL    Calcium 9.5 8.7 - 10.5 mg/dL    Total Protein 6.8 6.0 - 8.4 g/dL    Albumin 4.2 3.5 - 5.2 g/dL    Total Bilirubin 0.3 0.1 - 1.0 mg/dL    Alkaline Phosphatase 91 55 - 135 U/L    AST 20 10 - 40 U/L    ALT 20 10 - 44 U/L    Anion Gap 11 8 - 16 mmol/L    eGFR if African American >60 >60 mL/min/1.73 m^2    eGFR if non African American 58 (A) >60 mL/min/1.73 m^2   CBC auto differential    Collection Time: 07/29/20  2:19 PM   Result Value Ref Range    WBC 5.24 3.90 - 12.70 K/uL    RBC 4.30 4.00 - 5.40 M/uL    Hemoglobin 13.8 12.0 - 16.0 g/dL    Hematocrit 40.6 37.0 - 48.5 %    Mean Corpuscular Volume 94 82 - 98 fL    Mean Corpuscular Hemoglobin 32.1 (H) 27.0 - 31.0 pg    Mean Corpuscular Hemoglobin Conc 34.0 32.0 - 36.0 g/dL    RDW 12.4 11.5 - 14.5 %    Platelets 246 150 - 350 K/uL    MPV 9.9 9.2 - 12.9 fL    Immature Granulocytes 0.2 0.0 - 0.5 %    Gran # (ANC) 2.7 1.8 - 7.7 K/uL    Immature Grans (Abs) 0.01 0.00 - 0.04 K/uL    Lymph # 2.0 1.0 - 4.8 K/uL    Mono # 0.4 0.3 - 1.0 K/uL    Eos # 0.2 0.0 - 0.5 K/uL    Baso # 0.06 0.00 - 0.20 K/uL    nRBC 0 0 /100 WBC    Gran% 50.7 38.0 - 73.0 %    Lymph% 38.2 18.0 - 48.0 %    Mono% 6.9 4.0 - 15.0 %    Eosinophil% 3.1 0.0 - 8.0 %    Basophil% 1.1 0.0 - 1.9 %    Differential Method Automated        Lab Results   Component Value Date    HEPBCAB Negative 04/16/2019    HEPCAB Negative 10/10/2013       No results found for: TBGOLDPLUS       Ref. Range 7/18/2016 14:25 4/4/2017 17:25 2/21/2018 14:15 9/25/2018 07:35 8/19/2019 15:39   CCP Antibodies Latest Ref Range: <5.0 U/mL 107.4 (H) 163.6 (H) 125.7 (H) 115.2 (H) 270.5 (H)          Ref. Range 4/15/2014 13:30 3/31/2016 17:24 4/16/2019 14:18   Hep B Core Total Ab Unknown   Negative   Hep B S Ab Unknown   Negative   Hepatitis B Surface Ag Unknown   Negative   NIL Latest Units: IU/mL 0.03     Quantiferon Gold TB Latest Ref Range: Negative  Negative      TB-Nil Latest Units: IU/mL <0.00           MRI Cervical Spine Without Contrast  Narrative: EXAMINATION:  MRI CERVICAL SPINE WITHOUT CONTRAST    CLINICAL HISTORY:  Neck pain, prior xray, abn neuro exam;anterilolisthesis, abnormal x-ray suggesting instability;.  Cervicalgia    TECHNIQUE:  Multiplanar, multisequence MR images of the cervical spine were acquired without the administration of contrast.    COMPARISON:  MRI cervical spine from October 2013. radiographs from 08/21/2019    FINDINGS:  Visualized posterior fossa is intact.  The cord demonstrates normal signal intensity.  A vertebral body hemangioma is seen within C7.  This is unchanged.  No acute fracture.  No marrow edema.    C2-C3: Unremarkable    C3-C4: There is mild uncovertebral and facet arthropathy with mild bilateral neural foraminal narrowing which appears greater on the left.    C4-C5: There is uncovertebral hypertrophy which is greater on the left.  No significant spinal canal or neural foraminal stenosis.    C5-C6: A small left perineural cyst is again noted, unchanged.  No spinal canal stenosis.  Right neural foramen appears normal.    C6-C7: Facet arthropathy.  Mild uncovertebral hypertrophy.  Bilateral perineural cysts are again seen, unchanged.  Neural foramina otherwise appear normal.  Mild uncovertebral and facet arthropathy.  Spinal canal is maintained.    C7-T1: Small right perineural cyst again noted.  Neural foramina otherwise appears normal.  Mild facet arthropathy.  No spinal canal stenosis.  Impression: Overall, no substantial change since MRI from 10/18/2013.  Please see above for details.    Electronically signed by: Emeterio Beebe MD  Date:    08/23/2019  Time:    09:23               Assessment:       1. Rheumatoid arthritis involving multiple sites with positive rheumatoid factor    2. Immunocompromised    3. High risk medication use          Impression:  Seropositive rheumatoid arthritis -currently on orencia 125mg SQ every 21-28  days     EDDY-28 (CRP): 1.19 (Remission)  CDAI 0, BHASKAR 0    oral methotrexate because of side effects  In the past failed methotrexate monotherapy, failed Humira, Enbrel and Xeljanz    Immunocompromised:  Vaccines up-to-date      Medication monitoring with no toxicity side effects, chronic immunosuppression with no recurrent infections        Neck pain - muscle spasm - djd/facet arthritis -   Completed pt and has home traction  Seeing  chiropractor for heat, massage and rom stretching; no manipulation done  Now seeing interventional pain provider- 1st marlena some help, will have 2nd done soon, may get nerve ablation next if issues persist        Plan:         C/w orencia Q 21-28 days   No need to add dmard  RA controlled       Prn trt for RA  Add back mobic once daily as needed 1st for any joint swelling  If not better after 3 days then can consider short low dose pred taper    PRN trt for neck pain/neck muscle spasm   Baclofen 10 mg at bedtime prn; when she feels the tightness/stiffness in her neck just beginning then def add back the baclofen at night  Can also add back mobic at night for 1-2 days    heat and stretching  Get tens unit   Get otc salanpas w/lidocaine patch apply to upper back/neck and right shoulder region    May need to get back to spine and consider MBB/facet injections - nerve block etc       C/w chiropractor, pt and at home traction   ergonomics and posture       Tb and hep labs up to date     Declining flu vaccine     X-ray hand and feet every 1-2 yrs   Topical Voltaren gel  4 g qid prn   rtc 4 months with labs reg 4     Please call or send portal message with any questions or concerns

## 2020-08-06 DIAGNOSIS — M05.79 RHEUMATOID ARTHRITIS INVOLVING MULTIPLE SITES WITH POSITIVE RHEUMATOID FACTOR: Chronic | ICD-10-CM

## 2020-08-06 RX ORDER — PREDNISONE 10 MG/1
10 TABLET ORAL 2 TIMES DAILY PRN
Qty: 60 TABLET | Refills: 0 | Status: SHIPPED | OUTPATIENT
Start: 2020-08-06 | End: 2020-12-02 | Stop reason: SDUPTHER

## 2020-08-10 DIAGNOSIS — M05.79 RHEUMATOID ARTHRITIS INVOLVING MULTIPLE SITES WITH POSITIVE RHEUMATOID FACTOR: Chronic | ICD-10-CM

## 2020-08-10 RX ORDER — ABATACEPT 125 MG/ML
INJECTION, SOLUTION SUBCUTANEOUS
Qty: 4 ML | Refills: 4 | Status: SHIPPED | OUTPATIENT
Start: 2020-08-10 | End: 2020-12-02 | Stop reason: SDUPTHER

## 2020-08-10 NOTE — TELEPHONE ENCOUNTER
----- Message from Ananya Angel sent at 8/10/2020  4:12 PM CDT -----  Type:  RX Refill Request    Who Called:  Cha Becerraill or New Rx:   refill   RX Name and Strength:   Orenzia ?????125mg   How is the patient currently taking it? (ex. 1XDay):   Take every 7 days   Is this a 30 day or 90 day RX:   Preferred Pharmacy with phone number:  [facility]   Local or Mail Order:   Ordering Provider:  Ms Rubio   Would the patient rather a call back or a response via [facility]?   Call back   Best Call Back Number:   [phone]  Additional Information:  please call//thanks/[facility]

## 2020-09-29 ENCOUNTER — PATIENT MESSAGE (OUTPATIENT)
Dept: RHEUMATOLOGY | Facility: CLINIC | Age: 50
End: 2020-09-29

## 2020-11-30 ENCOUNTER — PATIENT MESSAGE (OUTPATIENT)
Dept: RHEUMATOLOGY | Facility: CLINIC | Age: 50
End: 2020-11-30

## 2020-12-01 ENCOUNTER — LAB VISIT (OUTPATIENT)
Dept: LAB | Facility: HOSPITAL | Age: 50
End: 2020-12-01
Attending: PHYSICIAN ASSISTANT
Payer: COMMERCIAL

## 2020-12-01 DIAGNOSIS — Z51.81 MEDICATION MONITORING ENCOUNTER: Chronic | ICD-10-CM

## 2020-12-01 DIAGNOSIS — M05.79 RHEUMATOID ARTHRITIS INVOLVING MULTIPLE SITES WITH POSITIVE RHEUMATOID FACTOR: Chronic | ICD-10-CM

## 2020-12-01 LAB
ALBUMIN SERPL BCP-MCNC: 4.3 G/DL (ref 3.5–5.2)
ALP SERPL-CCNC: 98 U/L (ref 55–135)
ALT SERPL W/O P-5'-P-CCNC: 31 U/L (ref 10–44)
ANION GAP SERPL CALC-SCNC: 11 MMOL/L (ref 8–16)
AST SERPL-CCNC: 29 U/L (ref 10–40)
BASOPHILS # BLD AUTO: 0.09 K/UL (ref 0–0.2)
BASOPHILS NFR BLD: 1.5 % (ref 0–1.9)
BILIRUB SERPL-MCNC: 0.3 MG/DL (ref 0.1–1)
BUN SERPL-MCNC: 23 MG/DL (ref 6–20)
CALCIUM SERPL-MCNC: 9.5 MG/DL (ref 8.7–10.5)
CHLORIDE SERPL-SCNC: 105 MMOL/L (ref 95–110)
CO2 SERPL-SCNC: 26 MMOL/L (ref 23–29)
CREAT SERPL-MCNC: 0.9 MG/DL (ref 0.5–1.4)
CRP SERPL-MCNC: 0.6 MG/L (ref 0–8.2)
DIFFERENTIAL METHOD: ABNORMAL
EOSINOPHIL # BLD AUTO: 0.3 K/UL (ref 0–0.5)
EOSINOPHIL NFR BLD: 5.5 % (ref 0–8)
ERYTHROCYTE [DISTWIDTH] IN BLOOD BY AUTOMATED COUNT: 12.2 % (ref 11.5–14.5)
ERYTHROCYTE [SEDIMENTATION RATE] IN BLOOD BY WESTERGREN METHOD: <2 MM/HR (ref 0–36)
EST. GFR  (AFRICAN AMERICAN): >60 ML/MIN/1.73 M^2
EST. GFR  (NON AFRICAN AMERICAN): >60 ML/MIN/1.73 M^2
GLUCOSE SERPL-MCNC: 89 MG/DL (ref 70–110)
HCT VFR BLD AUTO: 42.9 % (ref 37–48.5)
HGB BLD-MCNC: 14 G/DL (ref 12–16)
IMM GRANULOCYTES # BLD AUTO: 0.01 K/UL (ref 0–0.04)
IMM GRANULOCYTES NFR BLD AUTO: 0.2 % (ref 0–0.5)
LYMPHOCYTES # BLD AUTO: 2.1 K/UL (ref 1–4.8)
LYMPHOCYTES NFR BLD: 35.3 % (ref 18–48)
MCH RBC QN AUTO: 31.5 PG (ref 27–31)
MCHC RBC AUTO-ENTMCNC: 32.6 G/DL (ref 32–36)
MCV RBC AUTO: 97 FL (ref 82–98)
MONOCYTES # BLD AUTO: 0.5 K/UL (ref 0.3–1)
MONOCYTES NFR BLD: 9.1 % (ref 4–15)
NEUTROPHILS # BLD AUTO: 2.8 K/UL (ref 1.8–7.7)
NEUTROPHILS NFR BLD: 48.4 % (ref 38–73)
NRBC BLD-RTO: 0 /100 WBC
PLATELET # BLD AUTO: 258 K/UL (ref 150–350)
PMV BLD AUTO: 10.1 FL (ref 9.2–12.9)
POTASSIUM SERPL-SCNC: 4.6 MMOL/L (ref 3.5–5.1)
PROT SERPL-MCNC: 6.8 G/DL (ref 6–8.4)
RBC # BLD AUTO: 4.44 M/UL (ref 4–5.4)
SODIUM SERPL-SCNC: 142 MMOL/L (ref 136–145)
WBC # BLD AUTO: 5.83 K/UL (ref 3.9–12.7)

## 2020-12-01 PROCEDURE — 36415 COLL VENOUS BLD VENIPUNCTURE: CPT

## 2020-12-01 PROCEDURE — 86140 C-REACTIVE PROTEIN: CPT

## 2020-12-01 PROCEDURE — 85025 COMPLETE CBC W/AUTO DIFF WBC: CPT

## 2020-12-01 PROCEDURE — 80053 COMPREHEN METABOLIC PANEL: CPT

## 2020-12-01 PROCEDURE — 85652 RBC SED RATE AUTOMATED: CPT

## 2020-12-02 ENCOUNTER — OFFICE VISIT (OUTPATIENT)
Dept: RHEUMATOLOGY | Facility: CLINIC | Age: 50
End: 2020-12-02
Payer: COMMERCIAL

## 2020-12-02 VITALS
HEART RATE: 76 BPM | WEIGHT: 149.25 LBS | DIASTOLIC BLOOD PRESSURE: 72 MMHG | SYSTOLIC BLOOD PRESSURE: 117 MMHG | HEIGHT: 67 IN | BODY MASS INDEX: 23.43 KG/M2

## 2020-12-02 DIAGNOSIS — D84.9 IMMUNOCOMPROMISED: ICD-10-CM

## 2020-12-02 DIAGNOSIS — Z11.1 SCREENING-PULMONARY TB: ICD-10-CM

## 2020-12-02 DIAGNOSIS — M05.79 RHEUMATOID ARTHRITIS INVOLVING MULTIPLE SITES WITH POSITIVE RHEUMATOID FACTOR: Primary | Chronic | ICD-10-CM

## 2020-12-02 DIAGNOSIS — Z79.899 HIGH RISK MEDICATION USE: ICD-10-CM

## 2020-12-02 PROCEDURE — 3008F BODY MASS INDEX DOCD: CPT | Mod: CPTII,S$GLB,, | Performed by: PHYSICIAN ASSISTANT

## 2020-12-02 PROCEDURE — 99999 PR PBB SHADOW E&M-EST. PATIENT-LVL III: ICD-10-PCS | Mod: PBBFAC,,, | Performed by: PHYSICIAN ASSISTANT

## 2020-12-02 PROCEDURE — 99214 OFFICE O/P EST MOD 30 MIN: CPT | Mod: S$GLB,,, | Performed by: PHYSICIAN ASSISTANT

## 2020-12-02 PROCEDURE — 3008F PR BODY MASS INDEX (BMI) DOCUMENTED: ICD-10-PCS | Mod: CPTII,S$GLB,, | Performed by: PHYSICIAN ASSISTANT

## 2020-12-02 PROCEDURE — 99214 PR OFFICE/OUTPT VISIT, EST, LEVL IV, 30-39 MIN: ICD-10-PCS | Mod: S$GLB,,, | Performed by: PHYSICIAN ASSISTANT

## 2020-12-02 PROCEDURE — 99999 PR PBB SHADOW E&M-EST. PATIENT-LVL III: CPT | Mod: PBBFAC,,, | Performed by: PHYSICIAN ASSISTANT

## 2020-12-02 RX ORDER — ABATACEPT 125 MG/ML
INJECTION, SOLUTION SUBCUTANEOUS
Qty: 4 ML | Refills: 4 | Status: SHIPPED | OUTPATIENT
Start: 2020-12-02 | End: 2021-04-21

## 2020-12-02 RX ORDER — PREDNISONE 10 MG/1
10 TABLET ORAL EVERY MORNING
Qty: 30 TABLET | Refills: 3 | Status: SHIPPED | OUTPATIENT
Start: 2020-12-02 | End: 2020-12-30 | Stop reason: SDUPTHER

## 2020-12-02 NOTE — PROGRESS NOTES
Subjective:       [facility], [place]     Patient ID: Elizabeth Johns is a 50 y.o. female.    Chief Complaint: Rheumatoid Arthritis    Elizabeth is here today Rheum follow up.      She has seropositive (+ccp) nonerosive rheumatoid arthritis. She has been on orencia for several years. Her Orencia injection is every 3 wks currenlty; she ben change the frequency depending on how  RA is doing; sometimes may take weekly for a few weeks then when doing well pushes out to every 3-4 wks      She is not on oral dmard meds. she is taking cbd oil daily, turmeric, iron red krill, boswelia, juice plus   thinks it has helped keeping her healthy     Since we say her last she had Covid 19 infection late sept  She experience a lot of stiffness and pain in her joints during and since her infection  RA flare ups have been more persistent since then  She did skip her orencia when she was sick but now back on but only doing every 3 weeks  Has taken prednisone 10 mg/d on and off the past 8 weeks and did 1 medrol dose pack but joint issues recur when off pred    Pain, swelling and stiffness in her left MT joints, right wrist, fingers and shoulder all pain ful  More migrating pattern   Using mobic only prn but not much help, tried otc naproxen minimal help     Pain 3-4/10    Most recently more neck issues- neck right side> Left and pain right shoulder radiating down right arm to elbow  Mri showed ddd/djd and facet arthritis gabino C7 region  Prior x-ray shoulder showed some AC joint; Saw dr moya- ortho provider  He did not thing pain was from shoulder  Working w/chriropractor on ROM, did dry needling  Now also seeing  Dr Emery - had  marlena which did help some; recent nerve ablation some residual pain post procedure but the burning neck pain is better   Has baclofen to use prn for neck spasm  Heat, massage and stetching do help    Failed mtx monotherapy in the past. Off mtx due to side effects (fatigue  "and nausea). She is not on prednisone. In the past failed humira, enbrel and xeljanz these just did not help  Uses pennsaid topical bid prn. High titers CCP positive but sedimentation rate and CRP have been normal.  Rheumatoid factor negative.                   She complains of joint swelling. Pertinent negatives include no dysuria, fatigue, fever, trouble swallowing, myalgias or headaches.         Review of Systems   Constitutional: Negative.  Negative for activity change, appetite change, chills, fatigue and fever.   HENT: Negative.  Negative for mouth sores and trouble swallowing.         No dry mouth   Eyes: Negative.  Negative for photophobia, pain and redness.        No swollen or red eyes, no dry eye     Respiratory: Negative.  Negative for chest tightness, shortness of breath, wheezing and stridor.    Cardiovascular: Negative.  Negative for chest pain.   Gastrointestinal: Negative.  Negative for abdominal pain, blood in stool, diarrhea, nausea and vomiting.   Genitourinary: Negative.  Negative for dysuria, frequency, hematuria and urgency.   Musculoskeletal: Positive for arthralgias, joint swelling and neck pain. Negative for back pain, gait problem, myalgias and neck stiffness.   Skin: Negative.  Negative for color change, pallor and rash.   Neurological: Negative.  Negative for weakness and headaches.   Hematological: Negative for adenopathy.   Psychiatric/Behavioral: Negative for suicidal ideas.         Objective:   /72   Pulse 76   Ht 5' 7" (1.702 m)   Wt 67.7 kg (149 lb 4 oz)   BMI 23.38 kg/m²        Past Medical History:   Diagnosis Date    Arthritis     Rheumatoid      Immunization History   Administered Date(s) Administered    DTaP 06/26/2009    Influenza - High Dose - PF (65 years and older) 11/09/2016, 11/14/2017, 11/21/2018    Influenza - Quadrivalent 11/05/2014, 10/19/2015    Influenza Split 10/10/2013    Pneumococcal Conjugate - 13 Valent 12/19/2014    Pneumococcal " Polysaccharide - 23 Valent 12/02/2015    Tdap 07/21/2016    Zoster 10/10/2013          Physical Exam   Constitutional: She is oriented to person, place, and time and well-developed, well-nourished, and in no distress. No distress.   HENT:   Head: Normocephalic and atraumatic.   Right Ear: External ear normal.   Left Ear: External ear normal.   Mouth/Throat: No oropharyngeal exudate.   Eyes: Conjunctivae and EOM are normal. Pupils are equal, round, and reactive to light. No scleral icterus.   Neck: Normal range of motion. Neck supple. No thyromegaly present.   Cardiovascular: Normal rate, regular rhythm and normal heart sounds.    No murmur heard.  Pulmonary/Chest: Effort normal and breath sounds normal. She exhibits no tenderness.   Abdominal: Soft. Bowel sounds are normal.   Lymphadenopathy:     She has no cervical adenopathy.   Neurological: She is alert and oriented to person, place, and time. She displays normal reflexes. No cranial nerve deficit. She exhibits normal muscle tone. Gait normal.   Skin: Skin is warm and dry. No rash noted.     Musculoskeletal: Normal range of motion. Tenderness and edema present.      Comments:             Recent Results (from the past 336 hour(s))   Sedimentation rate, manual    Collection Time: 12/01/20  2:01 PM   Result Value Ref Range    Sed Rate <2 0 - 36 mm/Hr   C-reactive protein    Collection Time: 12/01/20  2:01 PM   Result Value Ref Range    CRP 0.6 0.0 - 8.2 mg/L   Comprehensive metabolic panel    Collection Time: 12/01/20  2:01 PM   Result Value Ref Range    Sodium 142 136 - 145 mmol/L    Potassium 4.6 3.5 - 5.1 mmol/L    Chloride 105 95 - 110 mmol/L    CO2 26 23 - 29 mmol/L    Glucose 89 70 - 110 mg/dL    BUN 23 (H) 6 - 20 mg/dL    Creatinine 0.9 0.5 - 1.4 mg/dL    Calcium 9.5 8.7 - 10.5 mg/dL    Total Protein 6.8 6.0 - 8.4 g/dL    Albumin 4.3 3.5 - 5.2 g/dL    Total Bilirubin 0.3 0.1 - 1.0 mg/dL    Alkaline Phosphatase 98 55 - 135 U/L    AST 29 10 - 40 U/L    ALT 31  10 - 44 U/L    Anion Gap 11 8 - 16 mmol/L    eGFR if African American >60 >60 mL/min/1.73 m^2    eGFR if non African American >60 >60 mL/min/1.73 m^2   CBC auto differential    Collection Time: 12/01/20  2:01 PM   Result Value Ref Range    WBC 5.83 3.90 - 12.70 K/uL    RBC 4.44 4.00 - 5.40 M/uL    Hemoglobin 14.0 12.0 - 16.0 g/dL    Hematocrit 42.9 37.0 - 48.5 %    MCV 97 82 - 98 fL    MCH 31.5 (H) 27.0 - 31.0 pg    MCHC 32.6 32.0 - 36.0 g/dL    RDW 12.2 11.5 - 14.5 %    Platelets 258 150 - 350 K/uL    MPV 10.1 9.2 - 12.9 fL    Immature Granulocytes 0.2 0.0 - 0.5 %    Gran # (ANC) 2.8 1.8 - 7.7 K/uL    Immature Grans (Abs) 0.01 0.00 - 0.04 K/uL    Lymph # 2.1 1.0 - 4.8 K/uL    Mono # 0.5 0.3 - 1.0 K/uL    Eos # 0.3 0.0 - 0.5 K/uL    Baso # 0.09 0.00 - 0.20 K/uL    nRBC 0 0 /100 WBC    Gran % 48.4 38.0 - 73.0 %    Lymph % 35.3 18.0 - 48.0 %    Mono % 9.1 4.0 - 15.0 %    Eosinophil % 5.5 0.0 - 8.0 %    Basophil % 1.5 0.0 - 1.9 %    Differential Method Automated        No results found for: TBGOLDPLUS  Lab Results   Component Value Date    HEPBCAB Negative 04/16/2019    HEPCAB Negative 10/10/2013        Ref. Range 8/19/2019 15:39 3/9/2020 15:16   CCP Antibodies Latest Ref Range: <5.0 U/mL 270.5 (H) 318.5 (H)      Ref. Range 4/4/2012 11:42 2/25/2013 15:37 7/18/2016 14:25   Rheumatoid Factor Latest Ref Range: 0.0 - 15.0 IU/mL < 10.0 < 10.0 <10.0       Results for orders placed during the hospital encounter of 06/11/19   X-Ray Hand 3 View Bilateral    Narrative EXAMINATION:  XR HAND COMPLETE 3 VIEWS BILATERAL    CLINICAL HISTORY:  . Rheumatoid arthritis with rheumatoid factor of multiple sites without organ or systems involvement    TECHNIQUE:  PA, lateral, and oblique views of both hands were performed.    COMPARISON:  12/07/2017    FINDINGS:  No acute abnormality or significant change with minimal degenerative findings.  No concerning erosions.      Impression As above      Electronically signed by: Satya Bautista  MD  Date:    06/11/2019  Time:    16:44     Results for orders placed during the hospital encounter of 06/11/19   X-Ray Foot Complete Bilateral    Narrative EXAMINATION:  XR FOOT COMPLETE 3 VIEW BILATERAL    CLINICAL HISTORY:  Rheumatoid arthritis with rheumatoid factor of multiple sites without organ or systems involvement    TECHNIQUE:  AP, lateral, and oblique views of both feet were performed.    COMPARISON:  12/07/2017    FINDINGS:  No acute osseous abnormality with similar 1st MTP joint degenerative changes bilaterally.  No concerning erosions.      Impression As above      Electronically signed by: Satya Bautista MD  Date:    06/11/2019  Time:    16:44         Results for orders placed during the hospital encounter of 03/27/14   X-Ray Chest PA And Lateral    Narrative Comparison: 10/16/13    Findings:    Allowing for differences in positioning and technique, there has been no significant interval change.  Previously described findings are again noted and stable in appearance.    Impression       Stable exam without acute infiltrate.         Electronically signed by: MIKKI SOLIS III, MD  Date:     03/27/14  Time:    16:55        No results found for this or any previous visit.      Assessment:       1. Rheumatoid arthritis involving multiple sites with positive rheumatoid factor    2. High risk medication use    3. Immunocompromised    4. Screening-pulmonary TB          1. Seropositive RA- on de-escalated dose orencia 125 mg Q 3 wks  Recent covid 19 infection w/ongoing RA migrating joint issues since then   Steroid responsive but pain recurrs once off  Failed dmards in the past    EDDY-28 (CRP): 2.88 (Low disease activity)  cdai 19  BHASKAR 0.4    2. Medication Monitoring- no current issues, no evidence of toxicity        Plan:     Orders Placed This Encounter   Procedures    Quantiferon Gold TB          Get back on orencia weekly dose for next 4 wks  pred 10 ma Q am for now and then wean down and off  If  issues persist then will do solumedrol 100 mg daily X 3 days     Mobic at night to help am stiffness      rtc 4 wks sukhi w/reg 4 labs

## 2020-12-14 ENCOUNTER — CLINICAL SUPPORT (OUTPATIENT)
Dept: PRIMARY CARE CLINIC | Facility: CLINIC | Age: 50
End: 2020-12-14
Payer: COMMERCIAL

## 2020-12-14 ENCOUNTER — OFFICE VISIT (OUTPATIENT)
Dept: PRIMARY CARE CLINIC | Facility: CLINIC | Age: 50
End: 2020-12-14
Payer: COMMERCIAL

## 2020-12-14 VITALS
WEIGHT: 149.25 LBS | HEART RATE: 67 BPM | BODY MASS INDEX: 23.38 KG/M2 | TEMPERATURE: 98 F | DIASTOLIC BLOOD PRESSURE: 70 MMHG | SYSTOLIC BLOOD PRESSURE: 100 MMHG | OXYGEN SATURATION: 98 %

## 2020-12-14 DIAGNOSIS — Z79.52 CURRENT CHRONIC USE OF SYSTEMIC STEROIDS: ICD-10-CM

## 2020-12-14 DIAGNOSIS — R07.9 CHEST PAIN, UNSPECIFIED TYPE: Primary | ICD-10-CM

## 2020-12-14 DIAGNOSIS — D84.9 IMMUNOCOMPROMISED: ICD-10-CM

## 2020-12-14 DIAGNOSIS — Z00.00 ROUTINE GENERAL MEDICAL EXAMINATION AT A HEALTH CARE FACILITY: ICD-10-CM

## 2020-12-14 DIAGNOSIS — U07.1 COVID-19: ICD-10-CM

## 2020-12-14 DIAGNOSIS — R20.0 NUMBNESS OF TOES: ICD-10-CM

## 2020-12-14 DIAGNOSIS — M05.79 RHEUMATOID ARTHRITIS INVOLVING MULTIPLE SITES WITH POSITIVE RHEUMATOID FACTOR: Chronic | ICD-10-CM

## 2020-12-14 PROCEDURE — 99204 PR OFFICE/OUTPT VISIT, NEW, LEVL IV, 45-59 MIN: ICD-10-PCS | Mod: S$GLB,,, | Performed by: FAMILY MEDICINE

## 2020-12-14 PROCEDURE — 93005 ELECTROCARDIOGRAM TRACING: CPT | Mod: S$GLB,,, | Performed by: FAMILY MEDICINE

## 2020-12-14 PROCEDURE — 99999 PR PBB SHADOW E&M-EST. PATIENT-LVL III: ICD-10-PCS | Mod: PBBFAC,,, | Performed by: FAMILY MEDICINE

## 2020-12-14 PROCEDURE — 93010 EKG 12-LEAD: ICD-10-PCS | Mod: S$GLB,,, | Performed by: INTERNAL MEDICINE

## 2020-12-14 PROCEDURE — 93005 EKG 12-LEAD: ICD-10-PCS | Mod: S$GLB,,, | Performed by: FAMILY MEDICINE

## 2020-12-14 PROCEDURE — 99999 PR PBB SHADOW E&M-EST. PATIENT-LVL III: CPT | Mod: PBBFAC,,, | Performed by: FAMILY MEDICINE

## 2020-12-14 PROCEDURE — 99204 OFFICE O/P NEW MOD 45 MIN: CPT | Mod: S$GLB,,, | Performed by: FAMILY MEDICINE

## 2020-12-14 PROCEDURE — 1126F AMNT PAIN NOTED NONE PRSNT: CPT | Mod: S$GLB,,, | Performed by: FAMILY MEDICINE

## 2020-12-14 PROCEDURE — 3008F PR BODY MASS INDEX (BMI) DOCUMENTED: ICD-10-PCS | Mod: CPTII,S$GLB,, | Performed by: FAMILY MEDICINE

## 2020-12-14 PROCEDURE — 93010 ELECTROCARDIOGRAM REPORT: CPT | Mod: S$GLB,,, | Performed by: INTERNAL MEDICINE

## 2020-12-14 PROCEDURE — 3008F BODY MASS INDEX DOCD: CPT | Mod: CPTII,S$GLB,, | Performed by: FAMILY MEDICINE

## 2020-12-14 PROCEDURE — 1126F PR PAIN SEVERITY QUANTIFIED, NO PAIN PRESENT: ICD-10-PCS | Mod: S$GLB,,, | Performed by: FAMILY MEDICINE

## 2020-12-14 NOTE — PROGRESS NOTES
Subjective:      Patient ID: Elizabeth Johns is a 50 y.o. female.    Chief Complaint: Annual Exam (est care )    Disclaimer:  This note is prepared using voice recognition software and as such is likely to have errors and has not been proof read. Please contact me for questions.     Elizabeth Johns is a 50 y.o. female who presents today to [facility]. Sees Niurka Rubio for rheumatology for RA.  In the past was on methotrexate but does not like to use it at this time.  Did recently have a flare up.  Has had more steroids lately with recent COVID.  Here because her prior pcp retired. Needs to get HM done.   Was Dr. Childers was her rheum.    GYN- Dr. Parisa Leiva. Did pap and mammo Dec 2020 [facility].   Did colonoscopy at age 48. Normal. 10 yr.  Uncertain where was done  Does not want flu shots or covid vaccines but is not against all at this point.  Needs to see a reason and a benefit for her.  Really working on her overall health.  Has seen several specialists including wellness, functional medicine specialist as well.  Reports that her diet is totally plant based. Is health based.  Does orencia only right now. . Wants to be off meds.  Not doing MTX for a few years and wants to be off of meds. Works out. Has moments but occasionally will have to use steroids.    She did get covid in Spet 30- Oct 2020. Did have a large RA flare during that time. Sept through Nov on a good bit of prednisone. Chest has been tight. Comes and goes.  also had it as well.  Has been having some tightness chest.   Has hernia, hasn't bothered her this year, did some stomach exercises at Gym. Takes a while for it go away.    No asthma.  Would like to see about doing a cardiac workup.  Does not have an EKG performed.  Has not done a chest x-ray either.  Gets more chest tightness off and on.  With laying back at times.  Otherwise random.  Not necessarily associated with food or exercise.  Does not feel anxious at this  time.    Youngest is 19, Osiel.  Is interested in possibly getting her set up as well with me as her PCP.    Occupation is in [employer] person from her home has done it for over 20 years does very well.    Patient Active Problem List:     Bilateral shoulder pain     Rheumatoid arthritis involving multiple sites with positive rheumatoid factor     High risk medication use     Immunocompromised     Numbness of toes     Subacromial bursitis of left shoulder joint     Cervical paraspinous muscle spasm    Current Outpatient Medications on File Prior to Visit:  ascorbic acid, vitamin C, (VITAMIN C) 1000 MG tablet, Take 1,000 mg by mouth., Disp: , Rfl:   diclofenac sodium 1 % Gel, Apply 2 g topically 4 (four) times daily., Disp: 300 g, Rfl: 6  fish oil-omega-3 fatty acids 300-1,000 mg capsule, Take 2 g by mouth., Disp: , Rfl:   HYDROcodone-acetaminophen (NORCO) 7.5-325 mg per tablet, Take 1 tablet by mouth every 6 (six) hours as needed for Pain., Disp: 30 tablet, Rfl: 0  hydrocortisone 2.5 % cream, Apply to the rash affected area on face twice daily., Disp: , Rfl:   meloxicam (MOBIC) 15 MG tablet, Take 1 tablet (15 mg total) by mouth daily as needed for Pain. As needed for pain, Disp: 30 tablet, Rfl: 3  multivitamin (THERAGRAN) per tablet, Take 1 tablet by mouth., Disp: , Rfl:   ORENCIA 125 mg/mL Syrg, INJECT 125MG UNDER THE SKIN EVERY 7 DAYS, Disp: 4 mL, Rfl: 4  predniSONE (DELTASONE) 10 MG tablet, Take 1 tablet (10 mg total) by mouth every morning. With food, Disp: 30 tablet, Rfl: 3  XIIDRA 5 % Dpet, , Disp: , Rfl:     No current facility-administered medications on file prior to visit.     Social History    Socioeconomic History      Marital status:         Tobacco Use      Smoking status: Never Smoker      Smokeless tobacco: Never Used    Substance and Sexual Activity      Alcohol use: No      Drug use: No      Sexual activity: Yes        Partners: Male      family history includes Rheum arthritis in her  mother.  Father had heart rhythm issues        Lab Results   Component Value Date    WBC 5.83 12/01/2020    HGB 14.0 12/01/2020    HCT 42.9 12/01/2020     12/01/2020    CHOL 173 01/08/2014    TRIG 29 (L) 01/08/2014    HDL 92 (H) 01/08/2014    ALT 31 12/01/2020    AST 29 12/01/2020     12/01/2020    K 4.6 12/01/2020     12/01/2020    CREATININE 0.9 12/01/2020    BUN 23 (H) 12/01/2020    CO2 26 12/01/2020       MRI Cervical Spine Without Contrast  Narrative: EXAMINATION:  MRI CERVICAL SPINE WITHOUT CONTRAST    CLINICAL HISTORY:  Neck pain, prior xray, abn neuro exam;anterilolisthesis, abnormal x-ray suggesting instability;.  Cervicalgia    TECHNIQUE:  Multiplanar, multisequence MR images of the cervical spine were acquired without the administration of contrast.    COMPARISON:  MRI cervical spine from October 2013. radiographs from 08/21/2019    FINDINGS:  Visualized posterior fossa is intact.  The cord demonstrates normal signal intensity.  A vertebral body hemangioma is seen within C7.  This is unchanged.  No acute fracture.  No marrow edema.    C2-C3: Unremarkable    C3-C4: There is mild uncovertebral and facet arthropathy with mild bilateral neural foraminal narrowing which appears greater on the left.    C4-C5: There is uncovertebral hypertrophy which is greater on the left.  No significant spinal canal or neural foraminal stenosis.    C5-C6: A small left perineural cyst is again noted, unchanged.  No spinal canal stenosis.  Right neural foramen appears normal.    C6-C7: Facet arthropathy.  Mild uncovertebral hypertrophy.  Bilateral perineural cysts are again seen, unchanged.  Neural foramina otherwise appear normal.  Mild uncovertebral and facet arthropathy.  Spinal canal is maintained.    C7-T1: Small right perineural cyst again noted.  Neural foramina otherwise appears normal.  Mild facet arthropathy.  No spinal canal stenosis.  Impression: Overall, no substantial change since MRI from  10/18/2013.  Please see above for details.    Electronically signed by: Emeterio Beebe MD  Date:    08/23/2019  Time:    09:23        Review of Systems   Constitutional: Negative for activity change, appetite change, chills, fatigue and fever.   HENT: Negative for ear pain and trouble swallowing.    Eyes: Negative for pain and visual disturbance.   Respiratory: Positive for cough. Negative for shortness of breath.    Cardiovascular: Positive for chest pain. Negative for leg swelling.   Gastrointestinal: Negative for abdominal pain, blood in stool, nausea and vomiting.   Endocrine: Negative for cold intolerance and heat intolerance.   Genitourinary: Negative for dysuria and frequency.   Musculoskeletal: Positive for arthralgias. Negative for joint swelling, myalgias and neck pain.   Skin: Negative for color change and rash.   Neurological: Negative for dizziness and headaches.   Psychiatric/Behavioral: Negative for behavioral problems and sleep disturbance. The patient is not nervous/anxious.      Objective:     Vitals:    12/14/20 1316   BP: 100/70   Pulse: 67   Temp: 97.7 °F (36.5 °C)   SpO2: 98%   Weight: 67.7 kg (149 lb 4 oz)     Physical Exam  Vitals signs reviewed.   Constitutional:       Appearance: Normal appearance. She is well-developed and normal weight.   HENT:      Head: Normocephalic and atraumatic.      Right Ear: Tympanic membrane and external ear normal.      Left Ear: Tympanic membrane and external ear normal.      Nose: Nose normal.      Mouth/Throat:      Mouth: Mucous membranes are moist.      Pharynx: Oropharynx is clear.   Eyes:      Conjunctiva/sclera: Conjunctivae normal.      Pupils: Pupils are equal, round, and reactive to light.   Neck:      Musculoskeletal: Normal range of motion and neck supple.      Thyroid: No thyromegaly.   Cardiovascular:      Rate and Rhythm: Normal rate and regular rhythm.      Heart sounds: No murmur. No friction rub. No gallop.    Pulmonary:      Effort: Pulmonary  effort is normal. No respiratory distress.      Breath sounds: No wheezing or rales.   Abdominal:      General: Bowel sounds are normal. There is no distension.      Palpations: Abdomen is soft.      Tenderness: There is no abdominal tenderness. There is no rebound.   Musculoskeletal: Normal range of motion.   Lymphadenopathy:      Cervical: No cervical adenopathy.   Skin:     General: Skin is warm and dry.      Findings: No rash.   Neurological:      Mental Status: She is alert and oriented to person, place, and time.   Psychiatric:         Attention and Perception: Attention and perception normal.         Mood and Affect: Mood and affect normal.         Speech: Speech normal.         Behavior: Behavior normal.         Thought Content: Thought content normal.         Cognition and Memory: Cognition and memory normal.         Judgment: Judgment normal.       Assessment:     1. Chest pain, unspecified type    2. COVID-19    3. Rheumatoid arthritis involving multiple sites with positive rheumatoid factor    4. Current chronic use of systemic steroids    5. Numbness of toes    6. Immunocompromised    7. Routine general medical examination at a health care facility      Plan:   Elizabeth was seen today for annual exam.    Diagnoses and all orders for this visit:    Chest pain, unspecified type  Comments:  New, post COVID.  EKG performed today normal sinus rhythm no abnormalities perform chest x-ray due workup suspect esophageal etiologies  Orders:  -     TSH; Future  -     T4, Free; Future  -     Lipid Panel; Future  -     Hemoglobin A1C; Future  -     TROPONIN I; Future  -     EKG 12-lead  -     X-Ray Chest PA And Lateral; Future    COVID-19  Comments:  Completed while on multiple steroids perform EKG troponin and chest x-ray follow-up if not improving with cough or chest tightness  Orders:  -     TSH; Future  -     T4, Free; Future  -     Lipid Panel; Future  -     Hemoglobin A1C; Future  -     TROPONIN I; Future  -      EKG 12-lead  -     X-Ray Chest PA And Lateral; Future    Rheumatoid arthritis involving multiple sites with positive rheumatoid factor  Comments:  Followed by rheumatology obtaining lab work recommend screening for diabetes due to recent use of steroids  Orders:  -     TSH; Future  -     T4, Free; Future  -     Lipid Panel; Future  -     Hemoglobin A1C; Future  -     TROPONIN I; Future  -     EKG 12-lead  -     X-Ray Chest PA And Lateral; Future    Current chronic use of systemic steroids  -     TSH; Future  -     T4, Free; Future  -     Lipid Panel; Future  -     Hemoglobin A1C; Future  -     TROPONIN I; Future  -     EKG 12-lead  -     X-Ray Chest PA And Lateral; Future    Numbness of toes  Comments:  Ongoing suspect may be related more directly with her recent higher use of steroids with COVID flare and rheumatologic flare    Immunocompromised    Routine general medical examination at a health care facility  Comments:  Code for additional labs recommended mammogram discussed need for vaccines when appropriate at this time declines.  Discussed risk factors            Follow up in about 1 year (around 12/14/2021) for physical with Dr WATSON.    Patient Instructions     Esophageal Spasm    The esophagus is a muscular tube that joins your mouth to your stomach. Contractions in the muscles in the esophagus help food move down to the stomach. When these muscles tighten or contract abnormally, it is known as spasm.   When the muscles spasm, it may feel like food is stuck and wont go down. It may cause a feeling of heartburn or a squeezing type of chest pain. The pain may spread to the neck, arm or back. If you try to swallow more food or liquid during a spasm, it may come back up within seconds.  Esophageal spasm is more common in people with gastroesophageal reflux disease (also called GERD). Very hot or very cold foods or foods that are not chewed enough before swallowing may trigger a spasm.  Home  "care  Medicines  Your healthcare provider may prescribe medicines to help reduce your symptoms. If you have an underlying condition, such as GERD, your healthcare provider may prescribe medicines to help manage it.   Take all medicines as prescribed. Do not take any medicines without talking to your healthcare provider first.  Diet  · Avoid any foods that seem to cause spasm. This may include very hot or very cold foods.  · Eat slowly and chew food well before swallowing.   · Avoid alcohol, caffeine, and tobacco, which can delay healing and worsen the problem.  · Try eating smaller meals. Have small snacks in between.  Follow-up care  Follow up with your healthcare provider or as advised by our staff.  When to seek medical advice  Call your healthcare provider if any of the following occur:  · Food that feels "stuck" in the esophagus for more than 30 minutes  · Inability to swallow solid or liquids for more than 30 minutes  · Symptoms that feel like esophageal spasm but occur with heavy sweating, dizziness, or shortness of breath  · Change in the usual patterns of your symptoms of esophageal spasm (new pattern of spreading to the neck, back, shoulder or arm; pain that is more severe than usual)  Date Last Reviewed: 6/22/2015  © 7400-1278 [employer]. [address] [place] [address]. All rights reserved. This information is not intended as a substitute for professional medical care. Always follow your healthcare professional's instructions.        What Is a Hiatal Hernia?    Hiatal hernia is when the area where the stomach and esophagus meet bulges up through the diaphragm into the chest cavity. In some cases, part of the stomach may bulge above the diaphragm. Stomach acid may move up into the esophagus and cause symptoms. The symptoms are often blamed on gastroesophageal reflux disease (GERD). You may only know about the hernia when it shows up on an X-ray taken for other reasons.   What you " may feel  The hiatus is a normal hole in the diaphragm. The esophagus passes through this hole and leads to the stomach. In some cases, part of the stomach may bulge above the diaphragm. This bulge is called a hernia. Stomach acid may move up into the esophagus and cause symptoms.  When you eat, the muscle at the hiatus relaxes to allow food to pass into the stomach. It tightens again to keep food and digestive acids in the stomach.  Many people with hiatal hernias have mild symptoms. You may notice the following GERD symptoms:  · Heartburn or other chest discomfort  · A feeling of chest fullness after a meal  · Frequent burping  · Acid taste in the mouth  · Trouble swallowing  Treating symptoms  If you have been diagnosed with hiatal hernia, these suggestions may help improve symptoms:  · Lose excess weight. Extra weight puts pressure on the stomach and esophagus.  · Dont lie down after eating. Sit up for at least an hour after eating. Lying down after eating can increase symptoms.  · Avoid certain foods and drinks. These include fatty foods, chocolate, coffee, mint, and other foods that cause symptoms for you.  · Dont smoke or drink alcohol. These can worsen symptoms.  · Look at your medicines. Discuss your medicines with your healthcare provider. Many medicines can cause symptoms.  · Consider an antacid medicine. Ask your healthcare provider about over-the-counter and prescription medicines that may help.  · Ask about surgery, if needed. Surgery is a treatment choice for some people. Your healthcare provider can determine if surgery is an option for you.    Date Last Reviewed: 10/1/2016  © 9808-1241 [employer]. [address] [place] [address], [place] [address]. All rights reserved. This information is not intended as a substitute for professional medical care. Always follow your healthcare professional's instructions.

## 2020-12-14 NOTE — PATIENT INSTRUCTIONS
"  Esophageal Spasm    The esophagus is a muscular tube that joins your mouth to your stomach. Contractions in the muscles in the esophagus help food move down to the stomach. When these muscles tighten or contract abnormally, it is known as spasm.   When the muscles spasm, it may feel like food is stuck and wont go down. It may cause a feeling of heartburn or a squeezing type of chest pain. The pain may spread to the neck, arm or back. If you try to swallow more food or liquid during a spasm, it may come back up within seconds.  Esophageal spasm is more common in people with gastroesophageal reflux disease (also called GERD). Very hot or very cold foods or foods that are not chewed enough before swallowing may trigger a spasm.  Home care  Medicines  Your healthcare provider may prescribe medicines to help reduce your symptoms. If you have an underlying condition, such as GERD, your healthcare provider may prescribe medicines to help manage it.   Take all medicines as prescribed. Do not take any medicines without talking to your healthcare provider first.  Diet  · Avoid any foods that seem to cause spasm. This may include very hot or very cold foods.  · Eat slowly and chew food well before swallowing.   · Avoid alcohol, caffeine, and tobacco, which can delay healing and worsen the problem.  · Try eating smaller meals. Have small snacks in between.  Follow-up care  Follow up with your healthcare provider or as advised by our staff.  When to seek medical advice  Call your healthcare provider if any of the following occur:  · Food that feels "stuck" in the esophagus for more than 30 minutes  · Inability to swallow solid or liquids for more than 30 minutes  · Symptoms that feel like esophageal spasm but occur with heavy sweating, dizziness, or shortness of breath  · Change in the usual patterns of your symptoms of esophageal spasm (new pattern of spreading to the neck, back, shoulder or arm; pain that is more severe " than usual)  Date Last Reviewed: 6/22/2015 © 2000-2017 [employer]. [address] [place] [address], [place] [address]. All rights reserved. This information is not intended as a substitute for professional medical care. Always follow your healthcare professional's instructions.        What Is a Hiatal Hernia?    Hiatal hernia is when the area where the stomach and esophagus meet bulges up through the diaphragm into the chest cavity. In some cases, part of the stomach may bulge above the diaphragm. Stomach acid may move up into the esophagus and cause symptoms. The symptoms are often blamed on gastroesophageal reflux disease (GERD). You may only know about the hernia when it shows up on an X-ray taken for other reasons.   What you may feel  The hiatus is a normal hole in the diaphragm. The esophagus passes through this hole and leads to the stomach. In some cases, part of the stomach may bulge above the diaphragm. This bulge is called a hernia. Stomach acid may move up into the esophagus and cause symptoms.  When you eat, the muscle at the hiatus relaxes to allow food to pass into the stomach. It tightens again to keep food and digestive acids in the stomach.  Many people with hiatal hernias have mild symptoms. You may notice the following GERD symptoms:  · Heartburn or other chest discomfort  · A feeling of chest fullness after a meal  · Frequent burping  · Acid taste in the mouth  · Trouble swallowing  Treating symptoms  If you have been diagnosed with hiatal hernia, these suggestions may help improve symptoms:  · Lose excess weight. Extra weight puts pressure on the stomach and esophagus.  · Dont lie down after eating. Sit up for at least an hour after eating. Lying down after eating can increase symptoms.  · Avoid certain foods and drinks. These include fatty foods, chocolate, coffee, mint, and other foods that cause symptoms for you.  · Dont smoke or drink alcohol. These can worsen symptoms.  · Look at  your medicines. Discuss your medicines with your healthcare provider. Many medicines can cause symptoms.  · Consider an antacid medicine. Ask your healthcare provider about over-the-counter and prescription medicines that may help.  · Ask about surgery, if needed. Surgery is a treatment choice for some people. Your healthcare provider can determine if surgery is an option for you.    Date Last Reviewed: 10/1/2016  © 7286-9420 [employer]. [address] [place] [address], [place] [address]. All rights reserved. This information is not intended as a substitute for professional medical care. Always follow your healthcare professional's instructions.

## 2020-12-15 ENCOUNTER — HOSPITAL ENCOUNTER (OUTPATIENT)
Dept: RADIOLOGY | Facility: HOSPITAL | Age: 50
Discharge: HOME | End: 2020-12-15
Attending: FAMILY MEDICINE
Payer: COMMERCIAL

## 2020-12-15 DIAGNOSIS — U07.1 COVID-19: ICD-10-CM

## 2020-12-15 DIAGNOSIS — Z79.52 CURRENT CHRONIC USE OF SYSTEMIC STEROIDS: ICD-10-CM

## 2020-12-15 DIAGNOSIS — M05.79 RHEUMATOID ARTHRITIS INVOLVING MULTIPLE SITES WITH POSITIVE RHEUMATOID FACTOR: ICD-10-CM

## 2020-12-15 DIAGNOSIS — R07.9 CHEST PAIN, UNSPECIFIED TYPE: ICD-10-CM

## 2020-12-15 PROCEDURE — 71046 X-RAY EXAM CHEST 2 VIEWS: CPT | Mod: 26,,, | Performed by: RADIOLOGY

## 2020-12-15 PROCEDURE — 71046 X-RAY EXAM CHEST 2 VIEWS: CPT | Mod: TC

## 2020-12-15 PROCEDURE — 71046 XR CHEST PA AND LATERAL: ICD-10-PCS | Mod: 26,,, | Performed by: RADIOLOGY

## 2020-12-16 DIAGNOSIS — Z12.31 OTHER SCREENING MAMMOGRAM: ICD-10-CM

## 2020-12-29 ENCOUNTER — TELEPHONE (OUTPATIENT)
Dept: RHEUMATOLOGY | Facility: CLINIC | Age: 50
End: 2020-12-29

## 2020-12-29 ENCOUNTER — LAB VISIT (OUTPATIENT)
Dept: LAB | Facility: HOSPITAL | Age: 50
End: 2020-12-29
Attending: PHYSICIAN ASSISTANT
Payer: COMMERCIAL

## 2020-12-29 DIAGNOSIS — M05.742 RHEUMATOID ARTHRITIS INVOLVING LEFT HAND WITH POSITIVE RHEUMATOID FACTOR: ICD-10-CM

## 2020-12-29 DIAGNOSIS — M05.79 RHEUMATOID ARTHRITIS INVOLVING MULTIPLE SITES WITH POSITIVE RHEUMATOID FACTOR: Chronic | ICD-10-CM

## 2020-12-29 DIAGNOSIS — Z51.81 MEDICATION MONITORING ENCOUNTER: Chronic | ICD-10-CM

## 2020-12-29 LAB
ALBUMIN SERPL BCP-MCNC: 4.2 G/DL (ref 3.5–5.2)
ALP SERPL-CCNC: 80 U/L (ref 55–135)
ALT SERPL W/O P-5'-P-CCNC: 27 U/L (ref 10–44)
ANION GAP SERPL CALC-SCNC: 9 MMOL/L (ref 8–16)
AST SERPL-CCNC: 26 U/L (ref 10–40)
BASOPHILS # BLD AUTO: 0.07 K/UL (ref 0–0.2)
BASOPHILS NFR BLD: 1.6 % (ref 0–1.9)
BILIRUB SERPL-MCNC: 0.4 MG/DL (ref 0.1–1)
BUN SERPL-MCNC: 22 MG/DL (ref 6–20)
CALCIUM SERPL-MCNC: 9.5 MG/DL (ref 8.7–10.5)
CHLORIDE SERPL-SCNC: 103 MMOL/L (ref 95–110)
CO2 SERPL-SCNC: 28 MMOL/L (ref 23–29)
CREAT SERPL-MCNC: 0.8 MG/DL (ref 0.5–1.4)
CRP SERPL-MCNC: 0.3 MG/L (ref 0–8.2)
DIFFERENTIAL METHOD: ABNORMAL
EOSINOPHIL # BLD AUTO: 0.3 K/UL (ref 0–0.5)
EOSINOPHIL NFR BLD: 5.8 % (ref 0–8)
ERYTHROCYTE [DISTWIDTH] IN BLOOD BY AUTOMATED COUNT: 12.1 % (ref 11.5–14.5)
ERYTHROCYTE [SEDIMENTATION RATE] IN BLOOD BY WESTERGREN METHOD: <2 MM/HR (ref 0–36)
EST. GFR  (AFRICAN AMERICAN): >60 ML/MIN/1.73 M^2
EST. GFR  (NON AFRICAN AMERICAN): >60 ML/MIN/1.73 M^2
GLUCOSE SERPL-MCNC: 103 MG/DL (ref 70–110)
HCT VFR BLD AUTO: 43.1 % (ref 37–48.5)
HGB BLD-MCNC: 14 G/DL (ref 12–16)
IMM GRANULOCYTES # BLD AUTO: 0 K/UL (ref 0–0.04)
IMM GRANULOCYTES NFR BLD AUTO: 0 % (ref 0–0.5)
LYMPHOCYTES # BLD AUTO: 1.5 K/UL (ref 1–4.8)
LYMPHOCYTES NFR BLD: 33.6 % (ref 18–48)
MCH RBC QN AUTO: 31.3 PG (ref 27–31)
MCHC RBC AUTO-ENTMCNC: 32.5 G/DL (ref 32–36)
MCV RBC AUTO: 96 FL (ref 82–98)
MONOCYTES # BLD AUTO: 0.5 K/UL (ref 0.3–1)
MONOCYTES NFR BLD: 10 % (ref 4–15)
NEUTROPHILS # BLD AUTO: 2.2 K/UL (ref 1.8–7.7)
NEUTROPHILS NFR BLD: 49 % (ref 38–73)
NRBC BLD-RTO: 0 /100 WBC
PLATELET # BLD AUTO: 272 K/UL (ref 150–350)
PMV BLD AUTO: 10.1 FL (ref 9.2–12.9)
POTASSIUM SERPL-SCNC: 4.5 MMOL/L (ref 3.5–5.1)
PROT SERPL-MCNC: 6.8 G/DL (ref 6–8.4)
RBC # BLD AUTO: 4.48 M/UL (ref 4–5.4)
SODIUM SERPL-SCNC: 140 MMOL/L (ref 136–145)
WBC # BLD AUTO: 4.5 K/UL (ref 3.9–12.7)

## 2020-12-29 PROCEDURE — 80053 COMPREHEN METABOLIC PANEL: CPT

## 2020-12-29 PROCEDURE — 36415 COLL VENOUS BLD VENIPUNCTURE: CPT

## 2020-12-29 PROCEDURE — 86200 CCP ANTIBODY: CPT

## 2020-12-29 PROCEDURE — 85025 COMPLETE CBC W/AUTO DIFF WBC: CPT

## 2020-12-29 PROCEDURE — 86140 C-REACTIVE PROTEIN: CPT

## 2020-12-29 PROCEDURE — 85652 RBC SED RATE AUTOMATED: CPT

## 2020-12-30 ENCOUNTER — OFFICE VISIT (OUTPATIENT)
Dept: RHEUMATOLOGY | Facility: CLINIC | Age: 50
End: 2020-12-30
Payer: COMMERCIAL

## 2020-12-30 ENCOUNTER — PATIENT OUTREACH (OUTPATIENT)
Dept: ADMINISTRATIVE | Facility: OTHER | Age: 50
End: 2020-12-30

## 2020-12-30 VITALS
HEIGHT: 67 IN | SYSTOLIC BLOOD PRESSURE: 108 MMHG | DIASTOLIC BLOOD PRESSURE: 71 MMHG | BODY MASS INDEX: 23.32 KG/M2 | WEIGHT: 148.56 LBS | HEART RATE: 70 BPM

## 2020-12-30 DIAGNOSIS — M25.475 SWELLING OF FOOT JOINT, LEFT: ICD-10-CM

## 2020-12-30 DIAGNOSIS — Z12.11 ENCOUNTER FOR FIT (FECAL IMMUNOCHEMICAL TEST) SCREENING: Primary | ICD-10-CM

## 2020-12-30 DIAGNOSIS — D84.9 IMMUNOCOMPROMISED: Primary | ICD-10-CM

## 2020-12-30 DIAGNOSIS — M05.772 RHEUMATOID ARTHRITIS INVOLVING LEFT FOOT WITH POSITIVE RHEUMATOID FACTOR: ICD-10-CM

## 2020-12-30 DIAGNOSIS — Z79.899 HIGH RISK MEDICATION USE: ICD-10-CM

## 2020-12-30 DIAGNOSIS — M05.79 RHEUMATOID ARTHRITIS INVOLVING MULTIPLE SITES WITH POSITIVE RHEUMATOID FACTOR: Chronic | ICD-10-CM

## 2020-12-30 LAB — CCP AB SER IA-ACNC: 393.3 U/ML

## 2020-12-30 PROCEDURE — 20600 DRAIN/INJ JOINT/BURSA W/O US: CPT | Mod: 59,LT,S$GLB, | Performed by: PHYSICIAN ASSISTANT

## 2020-12-30 PROCEDURE — 99214 OFFICE O/P EST MOD 30 MIN: CPT | Mod: 25,S$GLB,, | Performed by: PHYSICIAN ASSISTANT

## 2020-12-30 PROCEDURE — 20600 PR DRAIN/INJECT SMALL JOINT/BURSA: ICD-10-PCS | Mod: 59,LT,S$GLB, | Performed by: PHYSICIAN ASSISTANT

## 2020-12-30 PROCEDURE — 3008F BODY MASS INDEX DOCD: CPT | Mod: CPTII,S$GLB,, | Performed by: PHYSICIAN ASSISTANT

## 2020-12-30 PROCEDURE — 1125F PR PAIN SEVERITY QUANTIFIED, PAIN PRESENT: ICD-10-PCS | Mod: S$GLB,,, | Performed by: PHYSICIAN ASSISTANT

## 2020-12-30 PROCEDURE — 3008F PR BODY MASS INDEX (BMI) DOCUMENTED: ICD-10-PCS | Mod: CPTII,S$GLB,, | Performed by: PHYSICIAN ASSISTANT

## 2020-12-30 PROCEDURE — 99214 PR OFFICE/OUTPT VISIT, EST, LEVL IV, 30-39 MIN: ICD-10-PCS | Mod: 25,S$GLB,, | Performed by: PHYSICIAN ASSISTANT

## 2020-12-30 PROCEDURE — 99999 PR PBB SHADOW E&M-EST. PATIENT-LVL III: ICD-10-PCS | Mod: PBBFAC,,, | Performed by: PHYSICIAN ASSISTANT

## 2020-12-30 PROCEDURE — 1125F AMNT PAIN NOTED PAIN PRSNT: CPT | Mod: S$GLB,,, | Performed by: PHYSICIAN ASSISTANT

## 2020-12-30 PROCEDURE — 99999 PR PBB SHADOW E&M-EST. PATIENT-LVL III: CPT | Mod: PBBFAC,,, | Performed by: PHYSICIAN ASSISTANT

## 2020-12-30 RX ORDER — TRIAMCINOLONE ACETONIDE 40 MG/ML
16 INJECTION, SUSPENSION INTRA-ARTICULAR; INTRAMUSCULAR
Status: COMPLETED | OUTPATIENT
Start: 2020-12-30 | End: 2020-12-30

## 2020-12-30 RX ORDER — PREDNISONE 10 MG/1
10 TABLET ORAL EVERY MORNING
Qty: 30 TABLET | Refills: 3 | Status: SHIPPED | OUTPATIENT
Start: 2020-12-30 | End: 2021-05-04

## 2020-12-30 RX ADMIN — TRIAMCINOLONE ACETONIDE 16 MG: 40 INJECTION, SUSPENSION INTRA-ARTICULAR; INTRAMUSCULAR at 05:12

## 2020-12-30 NOTE — PROGRESS NOTES
Subjective:       [facility], [facility]     Patient ID: Elizabeth Johns is a 50 y.o. female.    Chief Complaint: Rheumatoid Arthritis    Elizabeth is here today Rheum 4 wk follow up.      She has seropositive (+ccp high titer) nonerosive rheumatoid arthritis. She has been on orencia for several years.    At last visit she had pushed her  Orencia injection is every 3 wks currenlty; she ben change the frequency depending on how  RA is doing; sometimes may take weekly for a few weeks then when doing well pushes out to every 3-4 wks      She has having a lot more joint issues so I wanted her to change back to weekly dose and stay there  Since then she is doing much better  All joint have settled down except her left foot  Left 3/4th mtp joints have swelling and pain; even paiful now w/her tennis shoes on  Pain today 6-7/10  All other joints 0/10    She is not on oral dmard meds. she is taking cbd oil daily, turmeric, iron red krill, boswelia, juice plus   thinks it has helped keeping her healthy     Had  Covid 19 infection late sept; she has recovered w/no residual issues     When her joints flare up she dose take 10 mg/d for 1-2 days then stops  Has not needed lately     Using mobic only prn but not much help, tried otc naproxen minimal help     Failed mtx monotherapy in the past. Off mtx due to side effects (fatigue and nausea).   In the past failed humira, enbrel and xeljanz these just did not help    High titers CCP positive but sedimentation rate and CRP have been normal.  Rheumatoid factor negative.                   She complains of joint swelling. Pertinent negatives include no dysuria, fatigue, fever, trouble swallowing, myalgias or headaches.         Review of Systems   Constitutional: Negative.  Negative for activity change, appetite change, chills, fatigue and fever.   HENT: Negative.  Negative for mouth sores and trouble swallowing.         No dry mouth   Eyes: Negative.   "Negative for photophobia, pain and redness.        No swollen or red eyes, no dry eye     Respiratory: Negative.  Negative for chest tightness, shortness of breath, wheezing and stridor.    Cardiovascular: Negative.  Negative for chest pain.   Gastrointestinal: Negative.  Negative for abdominal pain, blood in stool, diarrhea, nausea and vomiting.   Genitourinary: Negative.  Negative for dysuria, frequency, hematuria and urgency.   Musculoskeletal: Positive for arthralgias and joint swelling. Negative for back pain, gait problem, myalgias, neck pain and neck stiffness.   Skin: Negative.  Negative for color change, pallor and rash.   Neurological: Negative.  Negative for weakness and headaches.   Hematological: Negative for adenopathy.   Psychiatric/Behavioral: Negative for suicidal ideas.         Objective:   /71   Pulse 70   Ht 5' 7" (1.702 m)   Wt 67.4 kg (148 lb 9.4 oz)   LMP  (LMP Unknown)   BMI 23.27 kg/m²        Past Medical History:   Diagnosis Date    Arthritis     Rheumatoid      Immunization History   Administered Date(s) Administered    DTaP 06/26/2009    Influenza - High Dose - PF (65 years and older) 11/09/2016, 11/14/2017, 11/21/2018    Influenza - Quadrivalent 11/05/2014, 10/19/2015    Influenza Split 10/10/2013    Pneumococcal Conjugate - 13 Valent 12/19/2014    Pneumococcal Polysaccharide - 23 Valent 12/02/2015    Tdap 07/21/2016    Zoster 10/10/2013          Physical Exam   Constitutional: She is oriented to person, place, and time and well-developed, well-nourished, and in no distress. No distress.   HENT:   Head: Normocephalic and atraumatic.   Right Ear: External ear normal.   Left Ear: External ear normal.   Mouth/Throat: No oropharyngeal exudate.   Eyes: Conjunctivae and EOM are normal. Pupils are equal, round, and reactive to light. No scleral icterus.   Neck: Normal range of motion. Neck supple. No thyromegaly present.   Cardiovascular: Normal rate, regular rhythm and " normal heart sounds.    No murmur heard.  Pulmonary/Chest: Effort normal and breath sounds normal. She exhibits no tenderness.   Abdominal: Soft. Bowel sounds are normal.   Lymphadenopathy:     She has no cervical adenopathy.   Neurological: She is alert and oriented to person, place, and time. She displays normal reflexes. No cranial nerve deficit. She exhibits normal muscle tone. Gait normal.   Skin: Skin is warm and dry. No rash noted.     Musculoskeletal: Normal range of motion. Tenderness and edema present.      Comments: Left 3rd and 4th mtp have synovitis and ttp  + MT squeeze  Bunion 1st mtp and djd 5th mtp    Right foot no swelling or ttp  Remainder of msk normal, no swelling or ttp              Recent Results (from the past 336 hour(s))   Sedimentation rate, manual    Collection Time: 12/29/20 11:40 AM   Result Value Ref Range    Sed Rate <2 0 - 36 mm/Hr   C-reactive protein    Collection Time: 12/29/20 11:40 AM   Result Value Ref Range    CRP 0.3 0.0 - 8.2 mg/L   Comprehensive metabolic panel    Collection Time: 12/29/20 11:40 AM   Result Value Ref Range    Sodium 140 136 - 145 mmol/L    Potassium 4.5 3.5 - 5.1 mmol/L    Chloride 103 95 - 110 mmol/L    CO2 28 23 - 29 mmol/L    Glucose 103 70 - 110 mg/dL    BUN 22 (H) 6 - 20 mg/dL    Creatinine 0.8 0.5 - 1.4 mg/dL    Calcium 9.5 8.7 - 10.5 mg/dL    Total Protein 6.8 6.0 - 8.4 g/dL    Albumin 4.2 3.5 - 5.2 g/dL    Total Bilirubin 0.4 0.1 - 1.0 mg/dL    Alkaline Phosphatase 80 55 - 135 U/L    AST 26 10 - 40 U/L    ALT 27 10 - 44 U/L    Anion Gap 9 8 - 16 mmol/L    eGFR if African American >60 >60 mL/min/1.73 m^2    eGFR if non African American >60 >60 mL/min/1.73 m^2   CBC auto differential    Collection Time: 12/29/20 11:40 AM   Result Value Ref Range    WBC 4.50 3.90 - 12.70 K/uL    RBC 4.48 4.00 - 5.40 M/uL    Hemoglobin 14.0 12.0 - 16.0 g/dL    Hematocrit 43.1 37.0 - 48.5 %    MCV 96 82 - 98 fL    MCH 31.3 (H) 27.0 - 31.0 pg    MCHC 32.5 32.0 - 36.0 g/dL     RDW 12.1 11.5 - 14.5 %    Platelets 272 150 - 350 K/uL    MPV 10.1 9.2 - 12.9 fL    Immature Granulocytes 0.0 0.0 - 0.5 %    Gran # (ANC) 2.2 1.8 - 7.7 K/uL    Immature Grans (Abs) 0.00 0.00 - 0.04 K/uL    Lymph # 1.5 1.0 - 4.8 K/uL    Mono # 0.5 0.3 - 1.0 K/uL    Eos # 0.3 0.0 - 0.5 K/uL    Baso # 0.07 0.00 - 0.20 K/uL    nRBC 0 0 /100 WBC    Gran % 49.0 38.0 - 73.0 %    Lymph % 33.6 18.0 - 48.0 %    Mono % 10.0 4.0 - 15.0 %    Eosinophil % 5.8 0.0 - 8.0 %    Basophil % 1.6 0.0 - 1.9 %    Differential Method Automated    Cyclic citrul peptide antibody, IgG    Collection Time: 12/29/20 11:40 AM   Result Value Ref Range    CCP Antibodies 393.3 (H) <5.0 U/mL       No results found for: TBGOLDPLUS  Lab Results   Component Value Date    HEPBCAB Negative 04/16/2019    HEPCAB Negative 10/10/2013        Ref. Range 8/19/2019 15:39 3/9/2020 15:16   CCP Antibodies Latest Ref Range: <5.0 U/mL 270.5 (H) 318.5 (H)      Ref. Range 4/4/2012 11:42 2/25/2013 15:37 7/18/2016 14:25   Rheumatoid Factor Latest Ref Range: 0.0 - 15.0 IU/mL < 10.0 < 10.0 <10.0       Results for orders placed during the hospital encounter of 06/11/19   X-Ray Hand 3 View Bilateral    Narrative EXAMINATION:  XR HAND COMPLETE 3 VIEWS BILATERAL    CLINICAL HISTORY:  . Rheumatoid arthritis with rheumatoid factor of multiple sites without organ or systems involvement    TECHNIQUE:  PA, lateral, and oblique views of both hands were performed.    COMPARISON:  12/07/2017    FINDINGS:  No acute abnormality or significant change with minimal degenerative findings.  No concerning erosions.      Impression As above      Electronically signed by: Satya Bautista MD  Date:    06/11/2019  Time:    16:44     Results for orders placed during the hospital encounter of 06/11/19   X-Ray Foot Complete Bilateral    Narrative EXAMINATION:  XR FOOT COMPLETE 3 VIEW BILATERAL    CLINICAL HISTORY:  Rheumatoid arthritis with rheumatoid factor of multiple sites without organ or  systems involvement    TECHNIQUE:  AP, lateral, and oblique views of both feet were performed.    COMPARISON:  12/07/2017    FINDINGS:  No acute osseous abnormality with similar 1st MTP joint degenerative changes bilaterally.  No concerning erosions.      Impression As above      Electronically signed by: Satya Bautista MD  Date:    06/11/2019  Time:    16:44         Results for orders placed during the hospital encounter of 03/27/14   X-Ray Chest PA And Lateral    Narrative Comparison: 10/16/13    Findings:    Allowing for differences in positioning and technique, there has been no significant interval change.  Previously described findings are again noted and stable in appearance.    Impression       Stable exam without acute infiltrate.         Electronically signed by: MIKKI SOLIS III, MD  Date:     03/27/14  Time:    16:55        No results found for this or any previous visit.      Assessment:       1. Immunocompromised    2. Rheumatoid arthritis involving multiple sites with positive rheumatoid factor    3. High risk medication use    4. Rheumatoid arthritis involving left foot with positive rheumatoid factor    5. Swelling of foot joint, left          1. Seropositive RA- on de-escalated dose orencia 125 mg Q 3 wks  Recent covid 19 infection w/ongoing RA migrating joint issues since then   Steroid responsive but pain recurrs once off  Failed dmards in the past    EDDY-28 (CRP): 1.19 (Remission)  cdai 19  BHASKAR 0.4    2. Medication Monitoring- no current issues, no evidence of toxicity      3. Left 3rd and 4th persistent MTP pain and synovitis       Plan:     No orders of the defined types were placed in this encounter.           Inject left 3rd and 4th mtp joint    Procedure note: After verbal consent was obtained.  The left 3rd and 4th MTP joints were prepared with sterile prep.  The skin was anesthetized with 1% ethyl chloride.  Each joint  was injected with  0.3 mL of kenalog 40 mg/mL and  0.6 mL of 2%  lidocaine.  Hemostasis was obtained. The patient tolerated procedure well with no complications.  Patient diischarged with icing instructions    Discussed injection risk/potential side effects as described below    Risks of joint injections, steroid injections, and aspirations include but are not limited to:   Allergic reaction, Infection, Bleeding, Bruising, Post injection flare of joint swelling and pain, Skin depigmentation, Local fat atrophy, Tendon rupture, and/or Failure of symptoms to improve    Icing instructions given to patient- ice area of injection for 15--20 min at least  3-4 X daily for the next 2-3 days.    If worsening and progressive pain develops please call the office       If foot issues persist then will refer to ortho foot ankle specialist     C/w  Orencia 125 mg sub Q weekly dose and do not de-escalate  Topical voltaren gel qid prn to left foot    Only prn pred 10 mg Q 1-2 days for RA flare; use sparingly   Try adding mobic 1st     rtc 4 months  sukhi w/reg 4 labs

## 2021-01-05 ENCOUNTER — TELEPHONE (OUTPATIENT)
Dept: RHEUMATOLOGY | Facility: CLINIC | Age: 51
End: 2021-01-05

## 2021-01-29 ENCOUNTER — PATIENT MESSAGE (OUTPATIENT)
Dept: ADMINISTRATIVE | Facility: HOSPITAL | Age: 51
End: 2021-01-29

## 2021-02-23 ENCOUNTER — PATIENT MESSAGE (OUTPATIENT)
Dept: RHEUMATOLOGY | Facility: CLINIC | Age: 51
End: 2021-02-23

## 2021-03-09 ENCOUNTER — PATIENT MESSAGE (OUTPATIENT)
Dept: RHEUMATOLOGY | Facility: CLINIC | Age: 51
End: 2021-03-09

## 2021-03-15 ENCOUNTER — PATIENT MESSAGE (OUTPATIENT)
Dept: RHEUMATOLOGY | Facility: CLINIC | Age: 51
End: 2021-03-15

## 2021-03-16 ENCOUNTER — PATIENT OUTREACH (OUTPATIENT)
Dept: ADMINISTRATIVE | Facility: OTHER | Age: 51
End: 2021-03-16

## 2021-03-16 ENCOUNTER — LAB VISIT (OUTPATIENT)
Dept: LAB | Facility: HOSPITAL | Age: 51
End: 2021-03-16
Attending: PHYSICIAN ASSISTANT
Payer: COMMERCIAL

## 2021-03-16 DIAGNOSIS — M05.79 RHEUMATOID ARTHRITIS INVOLVING MULTIPLE SITES WITH POSITIVE RHEUMATOID FACTOR: ICD-10-CM

## 2021-03-16 LAB
ALBUMIN SERPL BCP-MCNC: 4.1 G/DL (ref 3.5–5.2)
ALP SERPL-CCNC: 88 U/L (ref 55–135)
ALT SERPL W/O P-5'-P-CCNC: 20 U/L (ref 10–44)
ANION GAP SERPL CALC-SCNC: 7 MMOL/L (ref 8–16)
AST SERPL-CCNC: 20 U/L (ref 10–40)
BASOPHILS # BLD AUTO: 0.07 K/UL (ref 0–0.2)
BASOPHILS NFR BLD: 1.4 % (ref 0–1.9)
BILIRUB SERPL-MCNC: 0.3 MG/DL (ref 0.1–1)
BUN SERPL-MCNC: 24 MG/DL (ref 6–20)
CALCIUM SERPL-MCNC: 9.2 MG/DL (ref 8.7–10.5)
CHLORIDE SERPL-SCNC: 106 MMOL/L (ref 95–110)
CO2 SERPL-SCNC: 27 MMOL/L (ref 23–29)
CREAT SERPL-MCNC: 1.1 MG/DL (ref 0.5–1.4)
CRP SERPL-MCNC: 0.5 MG/L (ref 0–8.2)
DIFFERENTIAL METHOD: ABNORMAL
EOSINOPHIL # BLD AUTO: 0.2 K/UL (ref 0–0.5)
EOSINOPHIL NFR BLD: 3.9 % (ref 0–8)
ERYTHROCYTE [DISTWIDTH] IN BLOOD BY AUTOMATED COUNT: 12.8 % (ref 11.5–14.5)
ERYTHROCYTE [SEDIMENTATION RATE] IN BLOOD BY WESTERGREN METHOD: 21 MM/HR (ref 0–20)
EST. GFR  (AFRICAN AMERICAN): >60 ML/MIN/1.73 M^2
EST. GFR  (NON AFRICAN AMERICAN): 58 ML/MIN/1.73 M^2
GLUCOSE SERPL-MCNC: 74 MG/DL (ref 70–110)
HCT VFR BLD AUTO: 39.3 % (ref 37–48.5)
HGB BLD-MCNC: 12.9 G/DL (ref 12–16)
IMM GRANULOCYTES # BLD AUTO: 0.01 K/UL (ref 0–0.04)
IMM GRANULOCYTES NFR BLD AUTO: 0.2 % (ref 0–0.5)
LYMPHOCYTES # BLD AUTO: 1.7 K/UL (ref 1–4.8)
LYMPHOCYTES NFR BLD: 35.4 % (ref 18–48)
MCH RBC QN AUTO: 31.4 PG (ref 27–31)
MCHC RBC AUTO-ENTMCNC: 32.8 G/DL (ref 32–36)
MCV RBC AUTO: 96 FL (ref 82–98)
MONOCYTES # BLD AUTO: 0.5 K/UL (ref 0.3–1)
MONOCYTES NFR BLD: 9.3 % (ref 4–15)
NEUTROPHILS # BLD AUTO: 2.5 K/UL (ref 1.8–7.7)
NEUTROPHILS NFR BLD: 49.8 % (ref 38–73)
NRBC BLD-RTO: 0 /100 WBC
PLATELET # BLD AUTO: 251 K/UL (ref 150–350)
PMV BLD AUTO: 9.8 FL (ref 9.2–12.9)
POTASSIUM SERPL-SCNC: 4 MMOL/L (ref 3.5–5.1)
PROT SERPL-MCNC: 6.4 G/DL (ref 6–8.4)
RBC # BLD AUTO: 4.11 M/UL (ref 4–5.4)
SODIUM SERPL-SCNC: 140 MMOL/L (ref 136–145)
WBC # BLD AUTO: 4.92 K/UL (ref 3.9–12.7)

## 2021-03-16 PROCEDURE — 85025 COMPLETE CBC W/AUTO DIFF WBC: CPT | Performed by: PHYSICIAN ASSISTANT

## 2021-03-16 PROCEDURE — 36415 COLL VENOUS BLD VENIPUNCTURE: CPT | Performed by: PHYSICIAN ASSISTANT

## 2021-03-16 PROCEDURE — 85651 RBC SED RATE NONAUTOMATED: CPT | Performed by: PHYSICIAN ASSISTANT

## 2021-03-16 PROCEDURE — 80053 COMPREHEN METABOLIC PANEL: CPT | Performed by: PHYSICIAN ASSISTANT

## 2021-03-16 PROCEDURE — 86140 C-REACTIVE PROTEIN: CPT | Performed by: PHYSICIAN ASSISTANT

## 2021-03-17 ENCOUNTER — INFUSION (OUTPATIENT)
Dept: INFUSION THERAPY | Facility: HOSPITAL | Age: 51
End: 2021-03-17
Attending: PHYSICIAN ASSISTANT
Payer: COMMERCIAL

## 2021-03-17 ENCOUNTER — OFFICE VISIT (OUTPATIENT)
Dept: RHEUMATOLOGY | Facility: CLINIC | Age: 51
End: 2021-03-17
Payer: COMMERCIAL

## 2021-03-17 VITALS
SYSTOLIC BLOOD PRESSURE: 112 MMHG | BODY MASS INDEX: 23.7 KG/M2 | DIASTOLIC BLOOD PRESSURE: 70 MMHG | WEIGHT: 151 LBS | HEART RATE: 59 BPM | HEIGHT: 67 IN

## 2021-03-17 VITALS
BODY MASS INDEX: 23.65 KG/M2 | SYSTOLIC BLOOD PRESSURE: 116 MMHG | HEART RATE: 65 BPM | RESPIRATION RATE: 18 BRPM | DIASTOLIC BLOOD PRESSURE: 73 MMHG | WEIGHT: 151 LBS | TEMPERATURE: 98 F | OXYGEN SATURATION: 98 %

## 2021-03-17 DIAGNOSIS — Z79.899 HIGH RISK MEDICATION USE: ICD-10-CM

## 2021-03-17 DIAGNOSIS — M05.79 RHEUMATOID ARTHRITIS INVOLVING MULTIPLE SITES WITH POSITIVE RHEUMATOID FACTOR: ICD-10-CM

## 2021-03-17 DIAGNOSIS — M05.772 RHEUMATOID ARTHRITIS INVOLVING LEFT FOOT WITH POSITIVE RHEUMATOID FACTOR: Primary | ICD-10-CM

## 2021-03-17 DIAGNOSIS — F19.982 INSOMNIA DUE TO DRUG: ICD-10-CM

## 2021-03-17 DIAGNOSIS — M05.79 RHEUMATOID ARTHRITIS INVOLVING MULTIPLE SITES WITH POSITIVE RHEUMATOID FACTOR: Chronic | ICD-10-CM

## 2021-03-17 PROCEDURE — 99999 PR PBB SHADOW E&M-EST. PATIENT-LVL III: ICD-10-PCS | Mod: PBBFAC,,, | Performed by: PHYSICIAN ASSISTANT

## 2021-03-17 PROCEDURE — 1125F AMNT PAIN NOTED PAIN PRSNT: CPT | Mod: S$GLB,,, | Performed by: PHYSICIAN ASSISTANT

## 2021-03-17 PROCEDURE — 99215 OFFICE O/P EST HI 40 MIN: CPT | Mod: S$GLB,,, | Performed by: PHYSICIAN ASSISTANT

## 2021-03-17 PROCEDURE — A4216 STERILE WATER/SALINE, 10 ML: HCPCS | Performed by: PHYSICIAN ASSISTANT

## 2021-03-17 PROCEDURE — 25000003 PHARM REV CODE 250: Performed by: PHYSICIAN ASSISTANT

## 2021-03-17 PROCEDURE — 96374 THER/PROPH/DIAG INJ IV PUSH: CPT

## 2021-03-17 PROCEDURE — 1125F PR PAIN SEVERITY QUANTIFIED, PAIN PRESENT: ICD-10-PCS | Mod: S$GLB,,, | Performed by: PHYSICIAN ASSISTANT

## 2021-03-17 PROCEDURE — 63600175 PHARM REV CODE 636 W HCPCS: Performed by: PHYSICIAN ASSISTANT

## 2021-03-17 PROCEDURE — 99215 PR OFFICE/OUTPT VISIT, EST, LEVL V, 40-54 MIN: ICD-10-PCS | Mod: S$GLB,,, | Performed by: PHYSICIAN ASSISTANT

## 2021-03-17 PROCEDURE — 3008F BODY MASS INDEX DOCD: CPT | Mod: CPTII,S$GLB,, | Performed by: PHYSICIAN ASSISTANT

## 2021-03-17 PROCEDURE — 3008F PR BODY MASS INDEX (BMI) DOCUMENTED: ICD-10-PCS | Mod: CPTII,S$GLB,, | Performed by: PHYSICIAN ASSISTANT

## 2021-03-17 PROCEDURE — 99999 PR PBB SHADOW E&M-EST. PATIENT-LVL III: CPT | Mod: PBBFAC,,, | Performed by: PHYSICIAN ASSISTANT

## 2021-03-17 RX ORDER — DIPHENHYDRAMINE HYDROCHLORIDE 50 MG/ML
50 INJECTION INTRAMUSCULAR; INTRAVENOUS ONCE AS NEEDED
Status: CANCELLED | OUTPATIENT
Start: 2021-03-17

## 2021-03-17 RX ORDER — EPINEPHRINE 0.3 MG/.3ML
0.3 INJECTION SUBCUTANEOUS ONCE AS NEEDED
Status: CANCELLED | OUTPATIENT
Start: 2021-04-14

## 2021-03-17 RX ORDER — METHYLPREDNISOLONE SOD SUCC 125 MG
125 VIAL (EA) INJECTION
Status: COMPLETED | OUTPATIENT
Start: 2021-03-17 | End: 2021-03-17

## 2021-03-17 RX ORDER — ACETAMINOPHEN 325 MG/1
650 TABLET ORAL
Status: CANCELLED | OUTPATIENT
Start: 2021-04-14

## 2021-03-17 RX ORDER — SODIUM CHLORIDE 0.9 % (FLUSH) 0.9 %
10 SYRINGE (ML) INJECTION
Status: CANCELLED | OUTPATIENT
Start: 2021-03-17

## 2021-03-17 RX ORDER — SODIUM CHLORIDE 0.9 % (FLUSH) 0.9 %
10 SYRINGE (ML) INJECTION
Status: DISCONTINUED | OUTPATIENT
Start: 2021-03-17 | End: 2021-03-17 | Stop reason: HOSPADM

## 2021-03-17 RX ORDER — HEPARIN 100 UNIT/ML
500 SYRINGE INTRAVENOUS
Status: CANCELLED | OUTPATIENT
Start: 2021-03-17

## 2021-03-17 RX ORDER — HEPARIN 100 UNIT/ML
500 SYRINGE INTRAVENOUS
Status: CANCELLED | OUTPATIENT
Start: 2021-04-14

## 2021-03-17 RX ORDER — HEPARIN 100 UNIT/ML
500 SYRINGE INTRAVENOUS
Status: CANCELLED | OUTPATIENT
Start: 2021-03-18

## 2021-03-17 RX ORDER — DIPHENHYDRAMINE HYDROCHLORIDE 50 MG/ML
50 INJECTION INTRAMUSCULAR; INTRAVENOUS
Status: CANCELLED | OUTPATIENT
Start: 2021-04-14

## 2021-03-17 RX ORDER — SODIUM CHLORIDE 0.9 % (FLUSH) 0.9 %
10 SYRINGE (ML) INJECTION
Status: CANCELLED | OUTPATIENT
Start: 2021-04-14

## 2021-03-17 RX ORDER — ACETAMINOPHEN 325 MG/1
650 TABLET ORAL
Status: CANCELLED | OUTPATIENT
Start: 2021-03-17

## 2021-03-17 RX ORDER — METHYLPREDNISOLONE SOD SUCC 125 MG
125 VIAL (EA) INJECTION
Status: CANCELLED
Start: 2021-03-18

## 2021-03-17 RX ORDER — EPINEPHRINE 0.3 MG/.3ML
0.3 INJECTION SUBCUTANEOUS ONCE AS NEEDED
Status: CANCELLED | OUTPATIENT
Start: 2021-03-17

## 2021-03-17 RX ORDER — DIPHENHYDRAMINE HYDROCHLORIDE 50 MG/ML
50 INJECTION INTRAMUSCULAR; INTRAVENOUS ONCE AS NEEDED
Status: CANCELLED | OUTPATIENT
Start: 2021-04-14

## 2021-03-17 RX ORDER — DIPHENHYDRAMINE HYDROCHLORIDE 50 MG/ML
50 INJECTION INTRAMUSCULAR; INTRAVENOUS
Status: CANCELLED | OUTPATIENT
Start: 2021-03-17

## 2021-03-17 RX ORDER — SODIUM CHLORIDE 0.9 % (FLUSH) 0.9 %
10 SYRINGE (ML) INJECTION
Status: CANCELLED | OUTPATIENT
Start: 2021-03-18

## 2021-03-17 RX ORDER — ALPRAZOLAM 1 MG/1
1 TABLET ORAL NIGHTLY PRN
Qty: 10 TABLET | Refills: 0 | Status: SHIPPED | OUTPATIENT
Start: 2021-03-17 | End: 2021-04-21 | Stop reason: ALTCHOICE

## 2021-03-17 RX ORDER — METHYLPREDNISOLONE SOD SUCC 125 MG
125 VIAL (EA) INJECTION
Status: CANCELLED
Start: 2021-03-17

## 2021-03-17 RX ADMIN — Medication 10 ML: at 01:03

## 2021-03-17 RX ADMIN — METHYLPREDNISOLONE SODIUM SUCCINATE 125 MG: 125 INJECTION, POWDER, FOR SOLUTION INTRAMUSCULAR; INTRAVENOUS at 01:03

## 2021-03-18 ENCOUNTER — INFUSION (OUTPATIENT)
Dept: INFUSION THERAPY | Facility: HOSPITAL | Age: 51
End: 2021-03-18
Attending: PHYSICIAN ASSISTANT
Payer: COMMERCIAL

## 2021-03-18 VITALS — DIASTOLIC BLOOD PRESSURE: 72 MMHG | HEART RATE: 62 BPM | SYSTOLIC BLOOD PRESSURE: 112 MMHG | RESPIRATION RATE: 16 BRPM

## 2021-03-18 DIAGNOSIS — M05.79 RHEUMATOID ARTHRITIS INVOLVING MULTIPLE SITES WITH POSITIVE RHEUMATOID FACTOR: ICD-10-CM

## 2021-03-18 DIAGNOSIS — M05.772 RHEUMATOID ARTHRITIS INVOLVING LEFT FOOT WITH POSITIVE RHEUMATOID FACTOR: Primary | ICD-10-CM

## 2021-03-18 PROCEDURE — 63600175 PHARM REV CODE 636 W HCPCS: Performed by: PHYSICIAN ASSISTANT

## 2021-03-18 PROCEDURE — 96374 THER/PROPH/DIAG INJ IV PUSH: CPT

## 2021-03-18 RX ORDER — DIPHENHYDRAMINE HYDROCHLORIDE 50 MG/ML
50 INJECTION INTRAMUSCULAR; INTRAVENOUS ONCE AS NEEDED
Status: CANCELLED | OUTPATIENT
Start: 2021-04-14

## 2021-03-18 RX ORDER — DIPHENHYDRAMINE HYDROCHLORIDE 50 MG/ML
50 INJECTION INTRAMUSCULAR; INTRAVENOUS
Status: CANCELLED | OUTPATIENT
Start: 2021-04-14

## 2021-03-18 RX ORDER — SODIUM CHLORIDE 0.9 % (FLUSH) 0.9 %
10 SYRINGE (ML) INJECTION
Status: DISCONTINUED | OUTPATIENT
Start: 2021-03-18 | End: 2021-03-18 | Stop reason: HOSPADM

## 2021-03-18 RX ORDER — METHYLPREDNISOLONE SOD SUCC 125 MG
125 VIAL (EA) INJECTION
Status: COMPLETED | OUTPATIENT
Start: 2021-03-18 | End: 2021-03-18

## 2021-03-18 RX ORDER — ACETAMINOPHEN 325 MG/1
650 TABLET ORAL
Status: CANCELLED | OUTPATIENT
Start: 2021-04-14

## 2021-03-18 RX ORDER — EPINEPHRINE 0.3 MG/.3ML
0.3 INJECTION SUBCUTANEOUS ONCE AS NEEDED
Status: CANCELLED | OUTPATIENT
Start: 2021-04-14

## 2021-03-18 RX ORDER — SODIUM CHLORIDE 0.9 % (FLUSH) 0.9 %
10 SYRINGE (ML) INJECTION
Status: CANCELLED | OUTPATIENT
Start: 2021-04-14

## 2021-03-18 RX ORDER — SODIUM CHLORIDE 0.9 % (FLUSH) 0.9 %
10 SYRINGE (ML) INJECTION
Status: CANCELLED | OUTPATIENT
Start: 2021-03-19

## 2021-03-18 RX ORDER — HEPARIN 100 UNIT/ML
500 SYRINGE INTRAVENOUS
Status: CANCELLED | OUTPATIENT
Start: 2021-04-14

## 2021-03-18 RX ORDER — HEPARIN 100 UNIT/ML
500 SYRINGE INTRAVENOUS
Status: CANCELLED | OUTPATIENT
Start: 2021-03-19

## 2021-03-18 RX ORDER — METHYLPREDNISOLONE SOD SUCC 125 MG
125 VIAL (EA) INJECTION
Status: CANCELLED
Start: 2021-03-19

## 2021-03-18 RX ADMIN — METHYLPREDNISOLONE SODIUM SUCCINATE 125 MG: 125 INJECTION, POWDER, FOR SOLUTION INTRAMUSCULAR; INTRAVENOUS at 02:03

## 2021-03-19 ENCOUNTER — INFUSION (OUTPATIENT)
Dept: INFUSION THERAPY | Facility: HOSPITAL | Age: 51
End: 2021-03-19
Attending: PHYSICIAN ASSISTANT
Payer: COMMERCIAL

## 2021-03-19 VITALS
TEMPERATURE: 98 F | RESPIRATION RATE: 18 BRPM | OXYGEN SATURATION: 98 % | SYSTOLIC BLOOD PRESSURE: 106 MMHG | HEIGHT: 67 IN | HEART RATE: 56 BPM | WEIGHT: 152.56 LBS | BODY MASS INDEX: 23.94 KG/M2 | DIASTOLIC BLOOD PRESSURE: 65 MMHG

## 2021-03-19 DIAGNOSIS — M05.79 RHEUMATOID ARTHRITIS INVOLVING MULTIPLE SITES WITH POSITIVE RHEUMATOID FACTOR: ICD-10-CM

## 2021-03-19 DIAGNOSIS — M05.772 RHEUMATOID ARTHRITIS INVOLVING LEFT FOOT WITH POSITIVE RHEUMATOID FACTOR: Primary | ICD-10-CM

## 2021-03-19 PROCEDURE — 96374 THER/PROPH/DIAG INJ IV PUSH: CPT

## 2021-03-19 PROCEDURE — A4216 STERILE WATER/SALINE, 10 ML: HCPCS | Performed by: PHYSICIAN ASSISTANT

## 2021-03-19 PROCEDURE — 63600175 PHARM REV CODE 636 W HCPCS: Performed by: PHYSICIAN ASSISTANT

## 2021-03-19 PROCEDURE — 96375 TX/PRO/DX INJ NEW DRUG ADDON: CPT

## 2021-03-19 PROCEDURE — 25000003 PHARM REV CODE 250: Performed by: PHYSICIAN ASSISTANT

## 2021-03-19 RX ORDER — SODIUM CHLORIDE 0.9 % (FLUSH) 0.9 %
10 SYRINGE (ML) INJECTION
Status: DISCONTINUED | OUTPATIENT
Start: 2021-03-19 | End: 2021-03-19 | Stop reason: HOSPADM

## 2021-03-19 RX ORDER — METHYLPREDNISOLONE SOD SUCC 125 MG
125 VIAL (EA) INJECTION
Status: COMPLETED | OUTPATIENT
Start: 2021-03-19 | End: 2021-03-19

## 2021-03-19 RX ORDER — METHYLPREDNISOLONE SOD SUCC 125 MG
125 VIAL (EA) INJECTION
Status: CANCELLED
Start: 2021-03-19

## 2021-03-19 RX ORDER — HEPARIN 100 UNIT/ML
500 SYRINGE INTRAVENOUS
Status: CANCELLED | OUTPATIENT
Start: 2021-03-19

## 2021-03-19 RX ORDER — SODIUM CHLORIDE 0.9 % (FLUSH) 0.9 %
10 SYRINGE (ML) INJECTION
Status: CANCELLED | OUTPATIENT
Start: 2021-03-19

## 2021-03-19 RX ADMIN — Medication 10 ML: at 02:03

## 2021-03-19 RX ADMIN — METHYLPREDNISOLONE SODIUM SUCCINATE 125 MG: 125 INJECTION, POWDER, FOR SOLUTION INTRAMUSCULAR; INTRAVENOUS at 02:03

## 2021-03-23 ENCOUNTER — PATIENT MESSAGE (OUTPATIENT)
Dept: RHEUMATOLOGY | Facility: CLINIC | Age: 51
End: 2021-03-23

## 2021-03-24 ENCOUNTER — INFUSION (OUTPATIENT)
Dept: INFUSION THERAPY | Facility: HOSPITAL | Age: 51
End: 2021-03-24
Attending: FAMILY MEDICINE
Payer: COMMERCIAL

## 2021-03-24 VITALS
SYSTOLIC BLOOD PRESSURE: 113 MMHG | HEART RATE: 67 BPM | TEMPERATURE: 98 F | DIASTOLIC BLOOD PRESSURE: 77 MMHG | WEIGHT: 149.06 LBS | BODY MASS INDEX: 23.34 KG/M2 | OXYGEN SATURATION: 98 % | RESPIRATION RATE: 16 BRPM

## 2021-03-24 DIAGNOSIS — M05.79 RHEUMATOID ARTHRITIS INVOLVING MULTIPLE SITES WITH POSITIVE RHEUMATOID FACTOR: Primary | ICD-10-CM

## 2021-03-24 PROCEDURE — 25000003 PHARM REV CODE 250: Performed by: PHYSICIAN ASSISTANT

## 2021-03-24 PROCEDURE — 63600175 PHARM REV CODE 636 W HCPCS: Mod: JG | Performed by: PHYSICIAN ASSISTANT

## 2021-03-24 PROCEDURE — 96365 THER/PROPH/DIAG IV INF INIT: CPT

## 2021-03-24 RX ORDER — SODIUM CHLORIDE 0.9 % (FLUSH) 0.9 %
10 SYRINGE (ML) INJECTION
Status: DISCONTINUED | OUTPATIENT
Start: 2021-03-24 | End: 2021-03-24 | Stop reason: HOSPADM

## 2021-03-24 RX ORDER — HEPARIN 100 UNIT/ML
500 SYRINGE INTRAVENOUS
Status: CANCELLED | OUTPATIENT
Start: 2021-04-14

## 2021-03-24 RX ORDER — SODIUM CHLORIDE 0.9 % (FLUSH) 0.9 %
10 SYRINGE (ML) INJECTION
Status: CANCELLED | OUTPATIENT
Start: 2021-04-14

## 2021-03-24 RX ORDER — DIPHENHYDRAMINE HYDROCHLORIDE 50 MG/ML
50 INJECTION INTRAMUSCULAR; INTRAVENOUS ONCE AS NEEDED
Status: CANCELLED | OUTPATIENT
Start: 2021-04-14

## 2021-03-24 RX ORDER — EPINEPHRINE 0.3 MG/.3ML
0.3 INJECTION SUBCUTANEOUS ONCE AS NEEDED
Status: CANCELLED | OUTPATIENT
Start: 2021-04-14

## 2021-03-24 RX ORDER — DIPHENHYDRAMINE HYDROCHLORIDE 50 MG/ML
50 INJECTION INTRAMUSCULAR; INTRAVENOUS
Status: CANCELLED | OUTPATIENT
Start: 2021-04-14

## 2021-03-24 RX ORDER — ACETAMINOPHEN 325 MG/1
650 TABLET ORAL
Status: CANCELLED | OUTPATIENT
Start: 2021-04-14

## 2021-03-24 RX ADMIN — TOCILIZUMAB 270 MG: 20 INJECTION, SOLUTION, CONCENTRATE INTRAVENOUS at 01:03

## 2021-03-31 ENCOUNTER — PROCEDURE VISIT (OUTPATIENT)
Dept: RHEUMATOLOGY | Facility: CLINIC | Age: 51
End: 2021-03-31
Payer: COMMERCIAL

## 2021-03-31 DIAGNOSIS — M19.072 OSTEOARTHRITIS OF FIRST METATARSOPHALANGEAL (MTP) JOINT OF BOTH FEET: ICD-10-CM

## 2021-03-31 DIAGNOSIS — M06.9: Primary | ICD-10-CM

## 2021-03-31 DIAGNOSIS — M19.071 OSTEOARTHRITIS OF FIRST METATARSOPHALANGEAL (MTP) JOINT OF BOTH FEET: ICD-10-CM

## 2021-03-31 PROCEDURE — 20600 SMALL JOINT ASPIRATION/INJECTION: R GREAT MTP, L GREAT MTP: ICD-10-PCS | Mod: 50,S$GLB,, | Performed by: PHYSICIAN ASSISTANT

## 2021-03-31 PROCEDURE — 20600 DRAIN/INJ JOINT/BURSA W/O US: CPT | Mod: 50,S$GLB,, | Performed by: PHYSICIAN ASSISTANT

## 2021-03-31 RX ORDER — TRIAMCINOLONE ACETONIDE 40 MG/ML
24 INJECTION, SUSPENSION INTRA-ARTICULAR; INTRAMUSCULAR ONCE
Status: COMPLETED | OUTPATIENT
Start: 2021-03-31 | End: 2021-03-31

## 2021-03-31 RX ADMIN — TRIAMCINOLONE ACETONIDE 24 MG: 40 INJECTION, SUSPENSION INTRA-ARTICULAR; INTRAMUSCULAR at 12:03

## 2021-04-08 ENCOUNTER — PATIENT MESSAGE (OUTPATIENT)
Dept: RHEUMATOLOGY | Facility: CLINIC | Age: 51
End: 2021-04-08

## 2021-04-19 ENCOUNTER — LAB VISIT (OUTPATIENT)
Dept: LAB | Facility: HOSPITAL | Age: 51
End: 2021-04-19
Attending: PHYSICIAN ASSISTANT
Payer: COMMERCIAL

## 2021-04-19 DIAGNOSIS — Z51.81 MEDICATION MONITORING ENCOUNTER: Chronic | ICD-10-CM

## 2021-04-19 DIAGNOSIS — M05.79 RHEUMATOID ARTHRITIS INVOLVING MULTIPLE SITES WITH POSITIVE RHEUMATOID FACTOR: Chronic | ICD-10-CM

## 2021-04-19 LAB
ALBUMIN SERPL BCP-MCNC: 3.9 G/DL (ref 3.5–5.2)
ALP SERPL-CCNC: 89 U/L (ref 55–135)
ALT SERPL W/O P-5'-P-CCNC: 84 U/L (ref 10–44)
ANION GAP SERPL CALC-SCNC: 10 MMOL/L (ref 8–16)
ANISOCYTOSIS BLD QL SMEAR: SLIGHT
AST SERPL-CCNC: 56 U/L (ref 10–40)
BASOPHILS # BLD AUTO: 0.04 K/UL (ref 0–0.2)
BASOPHILS NFR BLD: 1.4 % (ref 0–1.9)
BILIRUB SERPL-MCNC: 0.3 MG/DL (ref 0.1–1)
BUN SERPL-MCNC: 27 MG/DL (ref 6–20)
CALCIUM SERPL-MCNC: 9 MG/DL (ref 8.7–10.5)
CHLORIDE SERPL-SCNC: 104 MMOL/L (ref 95–110)
CO2 SERPL-SCNC: 28 MMOL/L (ref 23–29)
CREAT SERPL-MCNC: 1.2 MG/DL (ref 0.5–1.4)
CRP SERPL-MCNC: 6.6 MG/L (ref 0–8.2)
DACRYOCYTES BLD QL SMEAR: ABNORMAL
DIFFERENTIAL METHOD: ABNORMAL
EOSINOPHIL # BLD AUTO: 0.2 K/UL (ref 0–0.5)
EOSINOPHIL NFR BLD: 6.4 % (ref 0–8)
ERYTHROCYTE [DISTWIDTH] IN BLOOD BY AUTOMATED COUNT: 12.6 % (ref 11.5–14.5)
ERYTHROCYTE [SEDIMENTATION RATE] IN BLOOD BY WESTERGREN METHOD: 1 MM/HR (ref 0–20)
EST. GFR  (AFRICAN AMERICAN): 60 ML/MIN/1.73 M^2
EST. GFR  (NON AFRICAN AMERICAN): 52 ML/MIN/1.73 M^2
GLUCOSE SERPL-MCNC: 112 MG/DL (ref 70–110)
HCT VFR BLD AUTO: 41.3 % (ref 37–48.5)
HGB BLD-MCNC: 14.1 G/DL (ref 12–16)
IMM GRANULOCYTES # BLD AUTO: 0 K/UL (ref 0–0.04)
IMM GRANULOCYTES NFR BLD AUTO: 0 % (ref 0–0.5)
LYMPHOCYTES # BLD AUTO: 1.3 K/UL (ref 1–4.8)
LYMPHOCYTES NFR BLD: 45.7 % (ref 18–48)
MCH RBC QN AUTO: 32 PG (ref 27–31)
MCHC RBC AUTO-ENTMCNC: 34.1 G/DL (ref 32–36)
MCV RBC AUTO: 94 FL (ref 82–98)
MONOCYTES # BLD AUTO: 0.4 K/UL (ref 0.3–1)
MONOCYTES NFR BLD: 13.5 % (ref 4–15)
NEUTROPHILS # BLD AUTO: 0.9 K/UL (ref 1.8–7.7)
NEUTROPHILS NFR BLD: 33 % (ref 38–73)
NRBC BLD-RTO: 0 /100 WBC
PLATELET # BLD AUTO: 145 K/UL (ref 150–450)
PLATELET BLD QL SMEAR: ABNORMAL
PMV BLD AUTO: 10 FL (ref 9.2–12.9)
POIKILOCYTOSIS BLD QL SMEAR: SLIGHT
POTASSIUM SERPL-SCNC: 4.2 MMOL/L (ref 3.5–5.1)
PROT SERPL-MCNC: 6.3 G/DL (ref 6–8.4)
RBC # BLD AUTO: 4.4 M/UL (ref 4–5.4)
SODIUM SERPL-SCNC: 142 MMOL/L (ref 136–145)
WBC # BLD AUTO: 2.82 K/UL (ref 3.9–12.7)

## 2021-04-19 PROCEDURE — 36415 COLL VENOUS BLD VENIPUNCTURE: CPT | Performed by: PHYSICIAN ASSISTANT

## 2021-04-19 PROCEDURE — 80053 COMPREHEN METABOLIC PANEL: CPT | Performed by: PHYSICIAN ASSISTANT

## 2021-04-19 PROCEDURE — 85025 COMPLETE CBC W/AUTO DIFF WBC: CPT | Performed by: PHYSICIAN ASSISTANT

## 2021-04-19 PROCEDURE — 85651 RBC SED RATE NONAUTOMATED: CPT | Performed by: PHYSICIAN ASSISTANT

## 2021-04-19 PROCEDURE — 86140 C-REACTIVE PROTEIN: CPT | Performed by: PHYSICIAN ASSISTANT

## 2021-04-20 ENCOUNTER — PATIENT OUTREACH (OUTPATIENT)
Dept: ADMINISTRATIVE | Facility: OTHER | Age: 51
End: 2021-04-20

## 2021-04-21 ENCOUNTER — PATIENT MESSAGE (OUTPATIENT)
Dept: RHEUMATOLOGY | Facility: CLINIC | Age: 51
End: 2021-04-21

## 2021-04-21 ENCOUNTER — OFFICE VISIT (OUTPATIENT)
Dept: RHEUMATOLOGY | Facility: CLINIC | Age: 51
End: 2021-04-21
Payer: COMMERCIAL

## 2021-04-21 VITALS
BODY MASS INDEX: 23.39 KG/M2 | HEIGHT: 67 IN | DIASTOLIC BLOOD PRESSURE: 72 MMHG | HEART RATE: 69 BPM | WEIGHT: 149.06 LBS | SYSTOLIC BLOOD PRESSURE: 109 MMHG

## 2021-04-21 DIAGNOSIS — D84.9 IMMUNOCOMPROMISED: ICD-10-CM

## 2021-04-21 DIAGNOSIS — R74.8 ELEVATED LIVER ENZYMES: ICD-10-CM

## 2021-04-21 DIAGNOSIS — Z79.899 HIGH RISK MEDICATION USE: ICD-10-CM

## 2021-04-21 DIAGNOSIS — M05.79 RHEUMATOID ARTHRITIS INVOLVING MULTIPLE SITES WITH POSITIVE RHEUMATOID FACTOR: Primary | ICD-10-CM

## 2021-04-21 PROCEDURE — 99215 PR OFFICE/OUTPT VISIT, EST, LEVL V, 40-54 MIN: ICD-10-PCS | Mod: S$GLB,,, | Performed by: PHYSICIAN ASSISTANT

## 2021-04-21 PROCEDURE — 3008F BODY MASS INDEX DOCD: CPT | Mod: CPTII,S$GLB,, | Performed by: PHYSICIAN ASSISTANT

## 2021-04-21 PROCEDURE — 99215 OFFICE O/P EST HI 40 MIN: CPT | Mod: S$GLB,,, | Performed by: PHYSICIAN ASSISTANT

## 2021-04-21 PROCEDURE — 99999 PR PBB SHADOW E&M-EST. PATIENT-LVL III: CPT | Mod: PBBFAC,,, | Performed by: PHYSICIAN ASSISTANT

## 2021-04-21 PROCEDURE — 1125F PR PAIN SEVERITY QUANTIFIED, PAIN PRESENT: ICD-10-PCS | Mod: S$GLB,,, | Performed by: PHYSICIAN ASSISTANT

## 2021-04-21 PROCEDURE — 1125F AMNT PAIN NOTED PAIN PRSNT: CPT | Mod: S$GLB,,, | Performed by: PHYSICIAN ASSISTANT

## 2021-04-21 PROCEDURE — 99999 PR PBB SHADOW E&M-EST. PATIENT-LVL III: ICD-10-PCS | Mod: PBBFAC,,, | Performed by: PHYSICIAN ASSISTANT

## 2021-04-21 PROCEDURE — 3008F PR BODY MASS INDEX (BMI) DOCUMENTED: ICD-10-PCS | Mod: CPTII,S$GLB,, | Performed by: PHYSICIAN ASSISTANT

## 2021-04-22 ENCOUNTER — TELEPHONE (OUTPATIENT)
Dept: RHEUMATOLOGY | Facility: CLINIC | Age: 51
End: 2021-04-22

## 2021-04-23 ENCOUNTER — PATIENT MESSAGE (OUTPATIENT)
Dept: RHEUMATOLOGY | Facility: CLINIC | Age: 51
End: 2021-04-23

## 2021-04-27 ENCOUNTER — LAB VISIT (OUTPATIENT)
Dept: LAB | Facility: HOSPITAL | Age: 51
End: 2021-04-27
Attending: PHYSICIAN ASSISTANT
Payer: COMMERCIAL

## 2021-04-27 DIAGNOSIS — M05.79 RHEUMATOID ARTHRITIS INVOLVING MULTIPLE SITES WITH POSITIVE RHEUMATOID FACTOR: ICD-10-CM

## 2021-04-27 DIAGNOSIS — Z79.899 HIGH RISK MEDICATION USE: ICD-10-CM

## 2021-04-27 DIAGNOSIS — D84.9 IMMUNOCOMPROMISED: ICD-10-CM

## 2021-04-27 DIAGNOSIS — R74.8 ELEVATED LIVER ENZYMES: ICD-10-CM

## 2021-04-27 LAB
ALBUMIN SERPL BCP-MCNC: 4 G/DL (ref 3.5–5.2)
ALP SERPL-CCNC: 94 U/L (ref 55–135)
ALT SERPL W/O P-5'-P-CCNC: 60 U/L (ref 10–44)
AST SERPL-CCNC: 32 U/L (ref 10–40)
BASOPHILS # BLD AUTO: 0.07 K/UL (ref 0–0.2)
BASOPHILS NFR BLD: 1.1 % (ref 0–1.9)
BILIRUB DIRECT SERPL-MCNC: 0.2 MG/DL (ref 0.1–0.3)
BILIRUB SERPL-MCNC: 0.4 MG/DL (ref 0.1–1)
DIFFERENTIAL METHOD: ABNORMAL
EOSINOPHIL # BLD AUTO: 0.7 K/UL (ref 0–0.5)
EOSINOPHIL NFR BLD: 10.3 % (ref 0–8)
ERYTHROCYTE [DISTWIDTH] IN BLOOD BY AUTOMATED COUNT: 12.8 % (ref 11.5–14.5)
HCT VFR BLD AUTO: 42.4 % (ref 37–48.5)
HGB BLD-MCNC: 14 G/DL (ref 12–16)
IMM GRANULOCYTES # BLD AUTO: 0.02 K/UL (ref 0–0.04)
IMM GRANULOCYTES NFR BLD AUTO: 0.3 % (ref 0–0.5)
LYMPHOCYTES # BLD AUTO: 1.9 K/UL (ref 1–4.8)
LYMPHOCYTES NFR BLD: 28.8 % (ref 18–48)
MCH RBC QN AUTO: 31.4 PG (ref 27–31)
MCHC RBC AUTO-ENTMCNC: 33 G/DL (ref 32–36)
MCV RBC AUTO: 95 FL (ref 82–98)
MONOCYTES # BLD AUTO: 0.5 K/UL (ref 0.3–1)
MONOCYTES NFR BLD: 8 % (ref 4–15)
NEUTROPHILS # BLD AUTO: 3.4 K/UL (ref 1.8–7.7)
NEUTROPHILS NFR BLD: 51.5 % (ref 38–73)
NRBC BLD-RTO: 0 /100 WBC
PLATELET # BLD AUTO: 338 K/UL (ref 150–450)
PMV BLD AUTO: 9.2 FL (ref 9.2–12.9)
PROT SERPL-MCNC: 6.8 G/DL (ref 6–8.4)
RBC # BLD AUTO: 4.46 M/UL (ref 4–5.4)
WBC # BLD AUTO: 6.52 K/UL (ref 3.9–12.7)

## 2021-04-27 PROCEDURE — 85025 COMPLETE CBC W/AUTO DIFF WBC: CPT | Performed by: PHYSICIAN ASSISTANT

## 2021-04-27 PROCEDURE — 80076 HEPATIC FUNCTION PANEL: CPT | Performed by: PHYSICIAN ASSISTANT

## 2021-04-27 PROCEDURE — 86039 ANTINUCLEAR ANTIBODIES (ANA): CPT | Performed by: PHYSICIAN ASSISTANT

## 2021-04-27 PROCEDURE — 36415 COLL VENOUS BLD VENIPUNCTURE: CPT | Performed by: PHYSICIAN ASSISTANT

## 2021-04-27 PROCEDURE — 86235 NUCLEAR ANTIGEN ANTIBODY: CPT | Mod: 59 | Performed by: PHYSICIAN ASSISTANT

## 2021-04-27 PROCEDURE — 86038 ANTINUCLEAR ANTIBODIES: CPT | Performed by: PHYSICIAN ASSISTANT

## 2021-04-28 ENCOUNTER — PATIENT MESSAGE (OUTPATIENT)
Dept: RHEUMATOLOGY | Facility: CLINIC | Age: 51
End: 2021-04-28

## 2021-04-28 LAB
ANA PATTERN 1: NORMAL
ANA SER QL IF: POSITIVE
ANA TITR SER IF: NORMAL {TITER}

## 2021-04-29 LAB
ANTI SM ANTIBODY: 0.09 RATIO (ref 0–0.99)
ANTI SM/RNP ANTIBODY: 0.08 RATIO (ref 0–0.99)
ANTI-SM INTERPRETATION: NEGATIVE
ANTI-SM/RNP INTERPRETATION: NEGATIVE
ANTI-SSA ANTIBODY: 0.05 RATIO (ref 0–0.99)
ANTI-SSA INTERPRETATION: NEGATIVE
ANTI-SSB ANTIBODY: 0.1 RATIO (ref 0–0.99)
ANTI-SSB INTERPRETATION: NEGATIVE
DSDNA AB SER-ACNC: NORMAL [IU]/ML

## 2021-05-03 ENCOUNTER — TELEPHONE (OUTPATIENT)
Dept: RHEUMATOLOGY | Facility: CLINIC | Age: 51
End: 2021-05-03

## 2021-05-04 ENCOUNTER — OFFICE VISIT (OUTPATIENT)
Dept: RHEUMATOLOGY | Facility: CLINIC | Age: 51
End: 2021-05-04
Payer: COMMERCIAL

## 2021-05-04 VITALS
DIASTOLIC BLOOD PRESSURE: 68 MMHG | HEIGHT: 67 IN | SYSTOLIC BLOOD PRESSURE: 111 MMHG | BODY MASS INDEX: 23.29 KG/M2 | HEART RATE: 64 BPM | WEIGHT: 148.38 LBS

## 2021-05-04 DIAGNOSIS — R74.01 TRANSAMINITIS: ICD-10-CM

## 2021-05-04 DIAGNOSIS — D70.2 OTHER DRUG-INDUCED NEUTROPENIA: ICD-10-CM

## 2021-05-04 DIAGNOSIS — R20.0 NUMBNESS OF TOES: ICD-10-CM

## 2021-05-04 DIAGNOSIS — M05.79 RHEUMATOID ARTHRITIS INVOLVING MULTIPLE SITES WITH POSITIVE RHEUMATOID FACTOR: Primary | Chronic | ICD-10-CM

## 2021-05-04 DIAGNOSIS — R76.8 POSITIVE ANA (ANTINUCLEAR ANTIBODY): ICD-10-CM

## 2021-05-04 DIAGNOSIS — D84.9 IMMUNOCOMPROMISED: ICD-10-CM

## 2021-05-04 DIAGNOSIS — Z79.899 HIGH RISK MEDICATION USE: ICD-10-CM

## 2021-05-04 PROCEDURE — 1125F AMNT PAIN NOTED PAIN PRSNT: CPT | Mod: S$GLB,,, | Performed by: INTERNAL MEDICINE

## 2021-05-04 PROCEDURE — 99999 PR PBB SHADOW E&M-EST. PATIENT-LVL III: CPT | Mod: PBBFAC,,, | Performed by: INTERNAL MEDICINE

## 2021-05-04 PROCEDURE — 99999 PR PBB SHADOW E&M-EST. PATIENT-LVL III: ICD-10-PCS | Mod: PBBFAC,,, | Performed by: INTERNAL MEDICINE

## 2021-05-04 PROCEDURE — 3008F BODY MASS INDEX DOCD: CPT | Mod: CPTII,S$GLB,, | Performed by: INTERNAL MEDICINE

## 2021-05-04 PROCEDURE — 99215 OFFICE O/P EST HI 40 MIN: CPT | Mod: S$GLB,,, | Performed by: INTERNAL MEDICINE

## 2021-05-04 PROCEDURE — 1125F PR PAIN SEVERITY QUANTIFIED, PAIN PRESENT: ICD-10-PCS | Mod: S$GLB,,, | Performed by: INTERNAL MEDICINE

## 2021-05-04 PROCEDURE — 3008F PR BODY MASS INDEX (BMI) DOCUMENTED: ICD-10-PCS | Mod: CPTII,S$GLB,, | Performed by: INTERNAL MEDICINE

## 2021-05-04 PROCEDURE — 99215 PR OFFICE/OUTPT VISIT, EST, LEVL V, 40-54 MIN: ICD-10-PCS | Mod: S$GLB,,, | Performed by: INTERNAL MEDICINE

## 2021-05-04 RX ORDER — PREDNISONE 5 MG/1
5 TABLET ORAL DAILY
Qty: 90 TABLET | Refills: 3 | Status: SHIPPED | OUTPATIENT
Start: 2021-05-04 | End: 2022-12-28 | Stop reason: SDUPTHER

## 2021-05-04 RX ORDER — ABATACEPT 125 MG/ML
125 INJECTION, SOLUTION SUBCUTANEOUS
Qty: 4 SYRINGE | Refills: 5 | Status: SHIPPED | OUTPATIENT
Start: 2021-05-04 | End: 2021-10-08

## 2021-05-10 ENCOUNTER — PATIENT MESSAGE (OUTPATIENT)
Dept: RHEUMATOLOGY | Facility: CLINIC | Age: 51
End: 2021-05-10

## 2021-05-10 RX ORDER — ME-TETRAHYDROFOLATE/B12/HRB236 1-1-500 MG
1 CAPSULE ORAL DAILY
Qty: 90 CAPSULE | Refills: 4 | Status: SHIPPED | OUTPATIENT
Start: 2021-05-10 | End: 2021-10-08

## 2021-05-17 ENCOUNTER — PATIENT MESSAGE (OUTPATIENT)
Dept: RHEUMATOLOGY | Facility: CLINIC | Age: 51
End: 2021-05-17

## 2021-05-18 ENCOUNTER — PATIENT MESSAGE (OUTPATIENT)
Dept: RHEUMATOLOGY | Facility: CLINIC | Age: 51
End: 2021-05-18

## 2021-06-02 ENCOUNTER — PATIENT MESSAGE (OUTPATIENT)
Dept: RHEUMATOLOGY | Facility: CLINIC | Age: 51
End: 2021-06-02

## 2021-06-04 RX ORDER — DIPHENHYDRAMINE HYDROCHLORIDE 50 MG/ML
25 INJECTION INTRAMUSCULAR; INTRAVENOUS
Status: CANCELLED | OUTPATIENT
Start: 2021-06-04

## 2021-06-04 RX ORDER — EPINEPHRINE 0.3 MG/.3ML
0.3 INJECTION SUBCUTANEOUS ONCE AS NEEDED
Status: CANCELLED | OUTPATIENT
Start: 2021-06-04

## 2021-06-04 RX ORDER — METHYLPREDNISOLONE SOD SUCC 125 MG
100 VIAL (EA) INJECTION
Status: CANCELLED | OUTPATIENT
Start: 2021-06-04

## 2021-06-04 RX ORDER — HEPARIN 100 UNIT/ML
500 SYRINGE INTRAVENOUS
Status: CANCELLED | OUTPATIENT
Start: 2021-06-04

## 2021-06-04 RX ORDER — DIPHENHYDRAMINE HYDROCHLORIDE 50 MG/ML
50 INJECTION INTRAMUSCULAR; INTRAVENOUS ONCE AS NEEDED
Status: CANCELLED | OUTPATIENT
Start: 2021-06-04

## 2021-06-04 RX ORDER — ACETAMINOPHEN 325 MG/1
650 TABLET ORAL
Status: CANCELLED | OUTPATIENT
Start: 2021-06-04

## 2021-06-04 RX ORDER — METHYLPREDNISOLONE SOD SUCC 125 MG
100 VIAL (EA) INJECTION
Status: CANCELLED
Start: 2021-06-04

## 2021-06-04 RX ORDER — SODIUM CHLORIDE 0.9 % (FLUSH) 0.9 %
10 SYRINGE (ML) INJECTION
Status: CANCELLED | OUTPATIENT
Start: 2021-06-04

## 2021-06-07 ENCOUNTER — TELEPHONE (OUTPATIENT)
Dept: RHEUMATOLOGY | Facility: CLINIC | Age: 51
End: 2021-06-07

## 2021-06-09 ENCOUNTER — INFUSION (OUTPATIENT)
Dept: INFUSION THERAPY | Facility: HOSPITAL | Age: 51
End: 2021-06-09
Attending: PHYSICIAN ASSISTANT
Payer: COMMERCIAL

## 2021-06-09 VITALS
TEMPERATURE: 98 F | BODY MASS INDEX: 23.17 KG/M2 | HEART RATE: 76 BPM | DIASTOLIC BLOOD PRESSURE: 68 MMHG | WEIGHT: 147.94 LBS | RESPIRATION RATE: 18 BRPM | OXYGEN SATURATION: 97 % | SYSTOLIC BLOOD PRESSURE: 110 MMHG

## 2021-06-09 DIAGNOSIS — M05.79 RHEUMATOID ARTHRITIS INVOLVING MULTIPLE SITES WITH POSITIVE RHEUMATOID FACTOR: ICD-10-CM

## 2021-06-09 DIAGNOSIS — M05.772 RHEUMATOID ARTHRITIS INVOLVING LEFT FOOT WITH POSITIVE RHEUMATOID FACTOR: Primary | ICD-10-CM

## 2021-06-09 DIAGNOSIS — F19.982 DRUG-INDUCED INSOMNIA: Primary | ICD-10-CM

## 2021-06-09 PROCEDURE — 63600175 PHARM REV CODE 636 W HCPCS: Performed by: PHYSICIAN ASSISTANT

## 2021-06-09 PROCEDURE — 25000003 PHARM REV CODE 250: Performed by: PHYSICIAN ASSISTANT

## 2021-06-09 PROCEDURE — A4216 STERILE WATER/SALINE, 10 ML: HCPCS | Performed by: PHYSICIAN ASSISTANT

## 2021-06-09 PROCEDURE — 96375 TX/PRO/DX INJ NEW DRUG ADDON: CPT

## 2021-06-09 PROCEDURE — 96365 THER/PROPH/DIAG IV INF INIT: CPT

## 2021-06-09 RX ORDER — METHYLPREDNISOLONE SOD SUCC 125 MG
100 VIAL (EA) INJECTION
Status: CANCELLED
Start: 2021-06-23

## 2021-06-09 RX ORDER — DIPHENHYDRAMINE HYDROCHLORIDE 50 MG/ML
25 INJECTION INTRAMUSCULAR; INTRAVENOUS
Status: CANCELLED | OUTPATIENT
Start: 2021-06-23

## 2021-06-09 RX ORDER — SODIUM CHLORIDE 0.9 % (FLUSH) 0.9 %
10 SYRINGE (ML) INJECTION
Status: DISCONTINUED | OUTPATIENT
Start: 2021-06-09 | End: 2021-06-09 | Stop reason: HOSPADM

## 2021-06-09 RX ORDER — ACETAMINOPHEN 325 MG/1
650 TABLET ORAL
Status: CANCELLED | OUTPATIENT
Start: 2021-06-23

## 2021-06-09 RX ORDER — DIPHENHYDRAMINE HYDROCHLORIDE 50 MG/ML
50 INJECTION INTRAMUSCULAR; INTRAVENOUS ONCE AS NEEDED
Status: CANCELLED | OUTPATIENT
Start: 2021-06-23

## 2021-06-09 RX ORDER — METHYLPREDNISOLONE SOD SUCC 125 MG
100 VIAL (EA) INJECTION
Status: CANCELLED | OUTPATIENT
Start: 2021-06-23

## 2021-06-09 RX ORDER — METHYLPREDNISOLONE SOD SUCC 125 MG
100 VIAL (EA) INJECTION
Status: COMPLETED | OUTPATIENT
Start: 2021-06-09 | End: 2021-06-09

## 2021-06-09 RX ORDER — LORAZEPAM 0.5 MG/1
0.5 TABLET ORAL NIGHTLY PRN
Qty: 8 TABLET | Refills: 0 | Status: SHIPPED | OUTPATIENT
Start: 2021-06-09 | End: 2021-10-08

## 2021-06-09 RX ORDER — HEPARIN 100 UNIT/ML
500 SYRINGE INTRAVENOUS
Status: CANCELLED | OUTPATIENT
Start: 2021-06-23

## 2021-06-09 RX ORDER — EPINEPHRINE 0.3 MG/.3ML
0.3 INJECTION SUBCUTANEOUS ONCE AS NEEDED
Status: CANCELLED | OUTPATIENT
Start: 2021-06-23

## 2021-06-09 RX ORDER — SODIUM CHLORIDE 0.9 % (FLUSH) 0.9 %
10 SYRINGE (ML) INJECTION
Status: CANCELLED | OUTPATIENT
Start: 2021-06-23

## 2021-06-09 RX ADMIN — Medication 10 ML: at 03:06

## 2021-06-09 RX ADMIN — METHYLPREDNISOLONE SODIUM SUCCINATE 100 MG: 125 INJECTION, POWDER, FOR SOLUTION INTRAMUSCULAR; INTRAVENOUS at 03:06

## 2021-06-09 RX ADMIN — SODIUM CHLORIDE 750 MG: 9 INJECTION, SOLUTION INTRAVENOUS at 04:06

## 2021-06-18 ENCOUNTER — LAB VISIT (OUTPATIENT)
Dept: LAB | Facility: HOSPITAL | Age: 51
End: 2021-06-18
Attending: PHYSICIAN ASSISTANT
Payer: COMMERCIAL

## 2021-06-18 DIAGNOSIS — M05.79 RHEUMATOID ARTHRITIS INVOLVING MULTIPLE SITES WITH POSITIVE RHEUMATOID FACTOR: ICD-10-CM

## 2021-06-18 DIAGNOSIS — Z79.899 HIGH RISK MEDICATION USE: ICD-10-CM

## 2021-06-18 DIAGNOSIS — D84.9 IMMUNOCOMPROMISED: ICD-10-CM

## 2021-06-18 LAB
ALBUMIN SERPL BCP-MCNC: 3.9 G/DL (ref 3.5–5.2)
ALP SERPL-CCNC: 73 U/L (ref 55–135)
ALT SERPL W/O P-5'-P-CCNC: 25 U/L (ref 10–44)
ANION GAP SERPL CALC-SCNC: 10 MMOL/L (ref 8–16)
AST SERPL-CCNC: 22 U/L (ref 10–40)
BASOPHILS # BLD AUTO: 0.06 K/UL (ref 0–0.2)
BASOPHILS NFR BLD: 1 % (ref 0–1.9)
BILIRUB SERPL-MCNC: 0.3 MG/DL (ref 0.1–1)
BUN SERPL-MCNC: 27 MG/DL (ref 6–20)
CALCIUM SERPL-MCNC: 9.1 MG/DL (ref 8.7–10.5)
CHLORIDE SERPL-SCNC: 105 MMOL/L (ref 95–110)
CO2 SERPL-SCNC: 29 MMOL/L (ref 23–29)
CREAT SERPL-MCNC: 1.3 MG/DL (ref 0.5–1.4)
CRP SERPL-MCNC: 0.4 MG/L (ref 0–8.2)
DIFFERENTIAL METHOD: ABNORMAL
EOSINOPHIL # BLD AUTO: 0.2 K/UL (ref 0–0.5)
EOSINOPHIL NFR BLD: 3.9 % (ref 0–8)
ERYTHROCYTE [DISTWIDTH] IN BLOOD BY AUTOMATED COUNT: 12.6 % (ref 11.5–14.5)
ERYTHROCYTE [SEDIMENTATION RATE] IN BLOOD BY WESTERGREN METHOD: 3 MM/HR (ref 0–20)
EST. GFR  (AFRICAN AMERICAN): 54 ML/MIN/1.73 M^2
EST. GFR  (NON AFRICAN AMERICAN): 47 ML/MIN/1.73 M^2
GLUCOSE SERPL-MCNC: 81 MG/DL (ref 70–110)
HCT VFR BLD AUTO: 40.2 % (ref 37–48.5)
HGB BLD-MCNC: 13.2 G/DL (ref 12–16)
IMM GRANULOCYTES # BLD AUTO: 0.01 K/UL (ref 0–0.04)
IMM GRANULOCYTES NFR BLD AUTO: 0.2 % (ref 0–0.5)
LYMPHOCYTES # BLD AUTO: 2.6 K/UL (ref 1–4.8)
LYMPHOCYTES NFR BLD: 42.7 % (ref 18–48)
MCH RBC QN AUTO: 31.6 PG (ref 27–31)
MCHC RBC AUTO-ENTMCNC: 32.8 G/DL (ref 32–36)
MCV RBC AUTO: 96 FL (ref 82–98)
MONOCYTES # BLD AUTO: 0.5 K/UL (ref 0.3–1)
MONOCYTES NFR BLD: 8 % (ref 4–15)
NEUTROPHILS # BLD AUTO: 2.7 K/UL (ref 1.8–7.7)
NEUTROPHILS NFR BLD: 44.2 % (ref 38–73)
NRBC BLD-RTO: 0 /100 WBC
PLATELET # BLD AUTO: 260 K/UL (ref 150–450)
PMV BLD AUTO: 9.3 FL (ref 9.2–12.9)
POTASSIUM SERPL-SCNC: 4.1 MMOL/L (ref 3.5–5.1)
PROT SERPL-MCNC: 6.5 G/DL (ref 6–8.4)
RBC # BLD AUTO: 4.18 M/UL (ref 4–5.4)
SODIUM SERPL-SCNC: 144 MMOL/L (ref 136–145)
WBC # BLD AUTO: 6.11 K/UL (ref 3.9–12.7)

## 2021-06-18 PROCEDURE — 85025 COMPLETE CBC W/AUTO DIFF WBC: CPT | Performed by: PHYSICIAN ASSISTANT

## 2021-06-18 PROCEDURE — 86140 C-REACTIVE PROTEIN: CPT | Performed by: PHYSICIAN ASSISTANT

## 2021-06-18 PROCEDURE — 85651 RBC SED RATE NONAUTOMATED: CPT | Performed by: PHYSICIAN ASSISTANT

## 2021-06-18 PROCEDURE — 36415 COLL VENOUS BLD VENIPUNCTURE: CPT | Performed by: PHYSICIAN ASSISTANT

## 2021-06-18 PROCEDURE — 80053 COMPREHEN METABOLIC PANEL: CPT | Performed by: PHYSICIAN ASSISTANT

## 2021-06-21 ENCOUNTER — TELEPHONE (OUTPATIENT)
Dept: RHEUMATOLOGY | Facility: CLINIC | Age: 51
End: 2021-06-21

## 2021-06-21 ENCOUNTER — PATIENT MESSAGE (OUTPATIENT)
Dept: RHEUMATOLOGY | Facility: CLINIC | Age: 51
End: 2021-06-21

## 2021-06-23 ENCOUNTER — OFFICE VISIT (OUTPATIENT)
Dept: RHEUMATOLOGY | Facility: CLINIC | Age: 51
End: 2021-06-23
Payer: COMMERCIAL

## 2021-06-23 ENCOUNTER — INFUSION (OUTPATIENT)
Dept: INFUSION THERAPY | Facility: HOSPITAL | Age: 51
End: 2021-06-23
Attending: PHYSICIAN ASSISTANT
Payer: COMMERCIAL

## 2021-06-23 VITALS
HEART RATE: 68 BPM | TEMPERATURE: 98 F | SYSTOLIC BLOOD PRESSURE: 109 MMHG | HEIGHT: 67 IN | OXYGEN SATURATION: 97 % | WEIGHT: 147.94 LBS | RESPIRATION RATE: 18 BRPM | BODY MASS INDEX: 23.22 KG/M2 | DIASTOLIC BLOOD PRESSURE: 70 MMHG

## 2021-06-23 DIAGNOSIS — F19.982 DRUG-INDUCED INSOMNIA: ICD-10-CM

## 2021-06-23 DIAGNOSIS — M05.79 RHEUMATOID ARTHRITIS INVOLVING MULTIPLE SITES WITH POSITIVE RHEUMATOID FACTOR: Primary | ICD-10-CM

## 2021-06-23 DIAGNOSIS — M05.79 RHEUMATOID ARTHRITIS INVOLVING MULTIPLE SITES WITH POSITIVE RHEUMATOID FACTOR: ICD-10-CM

## 2021-06-23 DIAGNOSIS — D84.9 IMMUNOCOMPROMISED: ICD-10-CM

## 2021-06-23 DIAGNOSIS — Z79.899 HIGH RISK MEDICATION USE: ICD-10-CM

## 2021-06-23 DIAGNOSIS — M05.772 RHEUMATOID ARTHRITIS INVOLVING LEFT FOOT WITH POSITIVE RHEUMATOID FACTOR: Primary | ICD-10-CM

## 2021-06-23 PROCEDURE — 99214 OFFICE O/P EST MOD 30 MIN: CPT | Mod: S$GLB,,, | Performed by: PHYSICIAN ASSISTANT

## 2021-06-23 PROCEDURE — 96365 THER/PROPH/DIAG IV INF INIT: CPT

## 2021-06-23 PROCEDURE — 99214 PR OFFICE/OUTPT VISIT, EST, LEVL IV, 30-39 MIN: ICD-10-PCS | Mod: S$GLB,,, | Performed by: PHYSICIAN ASSISTANT

## 2021-06-23 PROCEDURE — 99999 PR PBB SHADOW E&M-EST. PATIENT-LVL III: CPT | Mod: PBBFAC,,, | Performed by: PHYSICIAN ASSISTANT

## 2021-06-23 PROCEDURE — 25000003 PHARM REV CODE 250: Performed by: PHYSICIAN ASSISTANT

## 2021-06-23 PROCEDURE — 99999 PR PBB SHADOW E&M-EST. PATIENT-LVL III: ICD-10-PCS | Mod: PBBFAC,,, | Performed by: PHYSICIAN ASSISTANT

## 2021-06-23 PROCEDURE — 63600175 PHARM REV CODE 636 W HCPCS: Mod: JA,JG | Performed by: PHYSICIAN ASSISTANT

## 2021-06-23 RX ORDER — EPINEPHRINE 0.3 MG/.3ML
0.3 INJECTION SUBCUTANEOUS ONCE AS NEEDED
Status: CANCELLED | OUTPATIENT
Start: 2021-07-07

## 2021-06-23 RX ORDER — DIPHENHYDRAMINE HYDROCHLORIDE 50 MG/ML
25 INJECTION INTRAMUSCULAR; INTRAVENOUS
Status: CANCELLED | OUTPATIENT
Start: 2021-07-07

## 2021-06-23 RX ORDER — SODIUM CHLORIDE 0.9 % (FLUSH) 0.9 %
10 SYRINGE (ML) INJECTION
Status: CANCELLED | OUTPATIENT
Start: 2021-07-07

## 2021-06-23 RX ORDER — HEPARIN 100 UNIT/ML
500 SYRINGE INTRAVENOUS
Status: CANCELLED | OUTPATIENT
Start: 2021-07-07

## 2021-06-23 RX ORDER — ACETAMINOPHEN 325 MG/1
650 TABLET ORAL
Status: CANCELLED | OUTPATIENT
Start: 2021-07-07

## 2021-06-23 RX ORDER — DIPHENHYDRAMINE HYDROCHLORIDE 50 MG/ML
50 INJECTION INTRAMUSCULAR; INTRAVENOUS ONCE AS NEEDED
Status: CANCELLED | OUTPATIENT
Start: 2021-07-07

## 2021-06-23 RX ORDER — METHYLPREDNISOLONE SOD SUCC 125 MG
100 VIAL (EA) INJECTION
Status: CANCELLED | OUTPATIENT
Start: 2021-07-07

## 2021-06-23 RX ORDER — SODIUM CHLORIDE 0.9 % (FLUSH) 0.9 %
10 SYRINGE (ML) INJECTION
Status: DISCONTINUED | OUTPATIENT
Start: 2021-06-23 | End: 2021-06-23 | Stop reason: HOSPADM

## 2021-06-23 RX ORDER — METHYLPREDNISOLONE SOD SUCC 125 MG
100 VIAL (EA) INJECTION
Status: CANCELLED
Start: 2021-06-23

## 2021-06-23 RX ADMIN — SODIUM CHLORIDE 1000 MG: 9 INJECTION, SOLUTION INTRAVENOUS at 11:06

## 2021-06-30 ENCOUNTER — PATIENT MESSAGE (OUTPATIENT)
Dept: ADMINISTRATIVE | Facility: HOSPITAL | Age: 51
End: 2021-06-30

## 2021-06-30 ENCOUNTER — PATIENT OUTREACH (OUTPATIENT)
Dept: ADMINISTRATIVE | Facility: HOSPITAL | Age: 51
End: 2021-06-30

## 2021-07-01 ENCOUNTER — PATIENT MESSAGE (OUTPATIENT)
Dept: RHEUMATOLOGY | Facility: CLINIC | Age: 51
End: 2021-07-01

## 2021-07-07 ENCOUNTER — TELEPHONE (OUTPATIENT)
Dept: ADMINISTRATIVE | Facility: HOSPITAL | Age: 51
End: 2021-07-07

## 2021-07-11 ENCOUNTER — PATIENT MESSAGE (OUTPATIENT)
Dept: RHEUMATOLOGY | Facility: CLINIC | Age: 51
End: 2021-07-11

## 2021-07-11 DIAGNOSIS — M05.79 RHEUMATOID ARTHRITIS INVOLVING MULTIPLE SITES WITH POSITIVE RHEUMATOID FACTOR: Primary | ICD-10-CM

## 2021-07-12 ENCOUNTER — TELEPHONE (OUTPATIENT)
Dept: RHEUMATOLOGY | Facility: CLINIC | Age: 51
End: 2021-07-12

## 2021-07-12 ENCOUNTER — PATIENT MESSAGE (OUTPATIENT)
Dept: RHEUMATOLOGY | Facility: CLINIC | Age: 51
End: 2021-07-12

## 2021-07-12 RX ORDER — UPADACITINIB 15 MG/1
15 TABLET, EXTENDED RELEASE ORAL DAILY
Qty: 30 TABLET | Refills: 6 | Status: SHIPPED | OUTPATIENT
Start: 2021-07-12 | End: 2022-02-15 | Stop reason: SDUPTHER

## 2021-07-19 ENCOUNTER — LAB VISIT (OUTPATIENT)
Dept: LAB | Facility: HOSPITAL | Age: 51
End: 2021-07-19
Attending: PHYSICIAN ASSISTANT
Payer: COMMERCIAL

## 2021-07-19 ENCOUNTER — SPECIALTY PHARMACY (OUTPATIENT)
Dept: PHARMACY | Facility: CLINIC | Age: 51
End: 2021-07-19

## 2021-07-19 DIAGNOSIS — Z79.899 HIGH RISK MEDICATION USE: ICD-10-CM

## 2021-07-19 DIAGNOSIS — M05.772 RHEUMATOID ARTHRITIS INVOLVING LEFT FOOT WITH POSITIVE RHEUMATOID FACTOR: Primary | ICD-10-CM

## 2021-07-19 DIAGNOSIS — D84.9 IMMUNOCOMPROMISED: ICD-10-CM

## 2021-07-19 DIAGNOSIS — M05.79 RHEUMATOID ARTHRITIS INVOLVING MULTIPLE SITES WITH POSITIVE RHEUMATOID FACTOR: ICD-10-CM

## 2021-07-19 LAB
ALBUMIN SERPL BCP-MCNC: 3.7 G/DL (ref 3.5–5.2)
ALP SERPL-CCNC: 105 U/L (ref 55–135)
ALT SERPL W/O P-5'-P-CCNC: 27 U/L (ref 10–44)
ANION GAP SERPL CALC-SCNC: 12 MMOL/L (ref 8–16)
AST SERPL-CCNC: 27 U/L (ref 10–40)
BASOPHILS # BLD AUTO: 0.06 K/UL (ref 0–0.2)
BASOPHILS NFR BLD: 1.4 % (ref 0–1.9)
BILIRUB SERPL-MCNC: 0.5 MG/DL (ref 0.1–1)
BUN SERPL-MCNC: 29 MG/DL (ref 6–20)
CALCIUM SERPL-MCNC: 9 MG/DL (ref 8.7–10.5)
CHLORIDE SERPL-SCNC: 106 MMOL/L (ref 95–110)
CO2 SERPL-SCNC: 23 MMOL/L (ref 23–29)
CREAT SERPL-MCNC: 1.1 MG/DL (ref 0.5–1.4)
CRP SERPL-MCNC: 0.4 MG/L (ref 0–8.2)
DIFFERENTIAL METHOD: ABNORMAL
EOSINOPHIL # BLD AUTO: 0.1 K/UL (ref 0–0.5)
EOSINOPHIL NFR BLD: 2.7 % (ref 0–8)
ERYTHROCYTE [DISTWIDTH] IN BLOOD BY AUTOMATED COUNT: 12.6 % (ref 11.5–14.5)
EST. GFR  (AFRICAN AMERICAN): >60 ML/MIN/1.73 M^2
EST. GFR  (NON AFRICAN AMERICAN): 57 ML/MIN/1.73 M^2
GLUCOSE SERPL-MCNC: 92 MG/DL (ref 70–110)
HCT VFR BLD AUTO: 38.5 % (ref 37–48.5)
HGB BLD-MCNC: 12.8 G/DL (ref 12–16)
IMM GRANULOCYTES # BLD AUTO: 0.01 K/UL (ref 0–0.04)
IMM GRANULOCYTES NFR BLD AUTO: 0.2 % (ref 0–0.5)
LYMPHOCYTES # BLD AUTO: 2.5 K/UL (ref 1–4.8)
LYMPHOCYTES NFR BLD: 57 % (ref 18–48)
MCH RBC QN AUTO: 31.9 PG (ref 27–31)
MCHC RBC AUTO-ENTMCNC: 33.2 G/DL (ref 32–36)
MCV RBC AUTO: 96 FL (ref 82–98)
MONOCYTES # BLD AUTO: 0.4 K/UL (ref 0.3–1)
MONOCYTES NFR BLD: 9.9 % (ref 4–15)
NEUTROPHILS # BLD AUTO: 1.3 K/UL (ref 1.8–7.7)
NEUTROPHILS NFR BLD: 28.8 % (ref 38–73)
NRBC BLD-RTO: 0 /100 WBC
PLATELET # BLD AUTO: 266 K/UL (ref 150–450)
PMV BLD AUTO: 9.9 FL (ref 9.2–12.9)
POTASSIUM SERPL-SCNC: 4.3 MMOL/L (ref 3.5–5.1)
PROT SERPL-MCNC: 6.3 G/DL (ref 6–8.4)
RBC # BLD AUTO: 4.01 M/UL (ref 4–5.4)
SODIUM SERPL-SCNC: 141 MMOL/L (ref 136–145)
WBC # BLD AUTO: 4.44 K/UL (ref 3.9–12.7)

## 2021-07-19 PROCEDURE — 85025 COMPLETE CBC W/AUTO DIFF WBC: CPT | Performed by: PHYSICIAN ASSISTANT

## 2021-07-19 PROCEDURE — 85652 RBC SED RATE AUTOMATED: CPT | Performed by: PHYSICIAN ASSISTANT

## 2021-07-19 PROCEDURE — 80053 COMPREHEN METABOLIC PANEL: CPT | Performed by: PHYSICIAN ASSISTANT

## 2021-07-19 PROCEDURE — 86140 C-REACTIVE PROTEIN: CPT | Performed by: PHYSICIAN ASSISTANT

## 2021-07-19 PROCEDURE — 36415 COLL VENOUS BLD VENIPUNCTURE: CPT | Performed by: PHYSICIAN ASSISTANT

## 2021-07-20 ENCOUNTER — PATIENT OUTREACH (OUTPATIENT)
Dept: ADMINISTRATIVE | Facility: OTHER | Age: 51
End: 2021-07-20

## 2021-07-20 LAB — ERYTHROCYTE [SEDIMENTATION RATE] IN BLOOD BY WESTERGREN METHOD: 3 MM/HR (ref 0–36)

## 2021-07-21 ENCOUNTER — OFFICE VISIT (OUTPATIENT)
Dept: RHEUMATOLOGY | Facility: CLINIC | Age: 51
End: 2021-07-21
Payer: COMMERCIAL

## 2021-07-21 ENCOUNTER — DOCUMENTATION ONLY (OUTPATIENT)
Dept: RHEUMATOLOGY | Facility: CLINIC | Age: 51
End: 2021-07-21

## 2021-07-21 ENCOUNTER — PATIENT MESSAGE (OUTPATIENT)
Dept: PHARMACY | Facility: CLINIC | Age: 51
End: 2021-07-21

## 2021-07-21 VITALS
HEIGHT: 67 IN | WEIGHT: 147.69 LBS | SYSTOLIC BLOOD PRESSURE: 118 MMHG | HEART RATE: 60 BPM | BODY MASS INDEX: 23.18 KG/M2 | DIASTOLIC BLOOD PRESSURE: 72 MMHG

## 2021-07-21 DIAGNOSIS — R53.82 CHRONIC FATIGUE: ICD-10-CM

## 2021-07-21 DIAGNOSIS — M06.9: ICD-10-CM

## 2021-07-21 DIAGNOSIS — M19.072 OSTEOARTHRITIS OF FIRST METATARSOPHALANGEAL (MTP) JOINT OF BOTH FEET: ICD-10-CM

## 2021-07-21 DIAGNOSIS — M19.071 OSTEOARTHRITIS OF FIRST METATARSOPHALANGEAL (MTP) JOINT OF BOTH FEET: ICD-10-CM

## 2021-07-21 DIAGNOSIS — D84.9 IMMUNOCOMPROMISED: ICD-10-CM

## 2021-07-21 DIAGNOSIS — M79.10 MUSCLE PAIN: ICD-10-CM

## 2021-07-21 DIAGNOSIS — Z79.899 HIGH RISK MEDICATION USE: ICD-10-CM

## 2021-07-21 DIAGNOSIS — M05.79 RHEUMATOID ARTHRITIS INVOLVING MULTIPLE SITES WITH POSITIVE RHEUMATOID FACTOR: Primary | ICD-10-CM

## 2021-07-21 PROCEDURE — 99999 PR PBB SHADOW E&M-EST. PATIENT-LVL IV: ICD-10-PCS | Mod: PBBFAC,,, | Performed by: PHYSICIAN ASSISTANT

## 2021-07-21 PROCEDURE — 99214 PR OFFICE/OUTPT VISIT, EST, LEVL IV, 30-39 MIN: ICD-10-PCS | Mod: 25,S$GLB,, | Performed by: PHYSICIAN ASSISTANT

## 2021-07-21 PROCEDURE — 1125F PR PAIN SEVERITY QUANTIFIED, PAIN PRESENT: ICD-10-PCS | Mod: CPTII,S$GLB,, | Performed by: PHYSICIAN ASSISTANT

## 2021-07-21 PROCEDURE — 99999 PR PBB SHADOW E&M-EST. PATIENT-LVL IV: CPT | Mod: PBBFAC,,, | Performed by: PHYSICIAN ASSISTANT

## 2021-07-21 PROCEDURE — 1125F AMNT PAIN NOTED PAIN PRSNT: CPT | Mod: CPTII,S$GLB,, | Performed by: PHYSICIAN ASSISTANT

## 2021-07-21 PROCEDURE — 3008F BODY MASS INDEX DOCD: CPT | Mod: CPTII,S$GLB,, | Performed by: PHYSICIAN ASSISTANT

## 2021-07-21 PROCEDURE — 3008F PR BODY MASS INDEX (BMI) DOCUMENTED: ICD-10-PCS | Mod: CPTII,S$GLB,, | Performed by: PHYSICIAN ASSISTANT

## 2021-07-21 PROCEDURE — 20600 DRAIN/INJ JOINT/BURSA W/O US: CPT | Mod: RT,S$GLB,, | Performed by: PHYSICIAN ASSISTANT

## 2021-07-21 PROCEDURE — 99214 OFFICE O/P EST MOD 30 MIN: CPT | Mod: 25,S$GLB,, | Performed by: PHYSICIAN ASSISTANT

## 2021-07-21 PROCEDURE — 20600 SMALL JOINT ASPIRATION/INJECTION: R GREAT MTP: ICD-10-PCS | Mod: RT,S$GLB,, | Performed by: PHYSICIAN ASSISTANT

## 2021-07-21 RX ORDER — TRIAMCINOLONE ACETONIDE 40 MG/ML
40 INJECTION, SUSPENSION INTRA-ARTICULAR; INTRAMUSCULAR
Status: DISCONTINUED | OUTPATIENT
Start: 2021-07-21 | End: 2021-07-21 | Stop reason: HOSPADM

## 2021-07-21 RX ADMIN — TRIAMCINOLONE ACETONIDE 40 MG: 40 INJECTION, SUSPENSION INTRA-ARTICULAR; INTRAMUSCULAR at 08:07

## 2021-07-25 ENCOUNTER — PATIENT MESSAGE (OUTPATIENT)
Dept: RHEUMATOLOGY | Facility: CLINIC | Age: 51
End: 2021-07-25

## 2021-07-26 ENCOUNTER — PATIENT MESSAGE (OUTPATIENT)
Dept: RHEUMATOLOGY | Facility: CLINIC | Age: 51
End: 2021-07-26

## 2021-07-29 ENCOUNTER — PATIENT MESSAGE (OUTPATIENT)
Dept: RHEUMATOLOGY | Facility: CLINIC | Age: 51
End: 2021-07-29

## 2021-07-29 DIAGNOSIS — M05.79 RHEUMATOID ARTHRITIS INVOLVING MULTIPLE SITES WITH POSITIVE RHEUMATOID FACTOR: ICD-10-CM

## 2021-07-29 DIAGNOSIS — U07.1 COVID-19 IN IMMUNOCOMPROMISED PATIENT: Primary | ICD-10-CM

## 2021-07-29 DIAGNOSIS — D84.9 IMMUNOCOMPROMISED: ICD-10-CM

## 2021-07-29 DIAGNOSIS — D84.9 COVID-19 IN IMMUNOCOMPROMISED PATIENT: Primary | ICD-10-CM

## 2021-07-30 ENCOUNTER — PATIENT MESSAGE (OUTPATIENT)
Dept: RHEUMATOLOGY | Facility: CLINIC | Age: 51
End: 2021-07-30

## 2021-08-16 ENCOUNTER — SPECIALTY PHARMACY (OUTPATIENT)
Dept: PHARMACY | Facility: CLINIC | Age: 51
End: 2021-08-16

## 2021-08-16 ENCOUNTER — PATIENT MESSAGE (OUTPATIENT)
Dept: PHARMACY | Facility: CLINIC | Age: 51
End: 2021-08-16

## 2021-08-27 ENCOUNTER — PATIENT MESSAGE (OUTPATIENT)
Dept: RHEUMATOLOGY | Facility: CLINIC | Age: 51
End: 2021-08-27

## 2021-09-10 ENCOUNTER — PATIENT MESSAGE (OUTPATIENT)
Dept: RHEUMATOLOGY | Facility: CLINIC | Age: 51
End: 2021-09-10

## 2021-09-13 ENCOUNTER — LAB VISIT (OUTPATIENT)
Dept: LAB | Facility: HOSPITAL | Age: 51
End: 2021-09-13
Attending: FAMILY MEDICINE
Payer: COMMERCIAL

## 2021-09-13 DIAGNOSIS — M05.79 RHEUMATOID ARTHRITIS INVOLVING MULTIPLE SITES WITH POSITIVE RHEUMATOID FACTOR: ICD-10-CM

## 2021-09-13 DIAGNOSIS — D84.9 IMMUNOCOMPROMISED: ICD-10-CM

## 2021-09-13 DIAGNOSIS — Z79.899 HIGH RISK MEDICATION USE: ICD-10-CM

## 2021-09-13 LAB
ALBUMIN SERPL BCP-MCNC: 3.9 G/DL (ref 3.5–5.2)
ALP SERPL-CCNC: 79 U/L (ref 55–135)
ALT SERPL W/O P-5'-P-CCNC: 34 U/L (ref 10–44)
ANION GAP SERPL CALC-SCNC: 12 MMOL/L (ref 8–16)
AST SERPL-CCNC: 27 U/L (ref 10–40)
BASOPHILS # BLD AUTO: 0.03 K/UL (ref 0–0.2)
BASOPHILS NFR BLD: 0.7 % (ref 0–1.9)
BILIRUB SERPL-MCNC: 0.2 MG/DL (ref 0.1–1)
BUN SERPL-MCNC: 24 MG/DL (ref 6–20)
CALCIUM SERPL-MCNC: 8.9 MG/DL (ref 8.7–10.5)
CHLORIDE SERPL-SCNC: 103 MMOL/L (ref 95–110)
CO2 SERPL-SCNC: 24 MMOL/L (ref 23–29)
CREAT SERPL-MCNC: 1.3 MG/DL (ref 0.5–1.4)
CRP SERPL-MCNC: <0.1 MG/L (ref 0–8.2)
DIFFERENTIAL METHOD: ABNORMAL
EOSINOPHIL # BLD AUTO: 0.1 K/UL (ref 0–0.5)
EOSINOPHIL NFR BLD: 1.1 % (ref 0–8)
ERYTHROCYTE [DISTWIDTH] IN BLOOD BY AUTOMATED COUNT: 13.1 % (ref 11.5–14.5)
EST. GFR  (AFRICAN AMERICAN): 54 ML/MIN/1.73 M^2
EST. GFR  (NON AFRICAN AMERICAN): 47 ML/MIN/1.73 M^2
GLUCOSE SERPL-MCNC: 87 MG/DL (ref 70–110)
HCT VFR BLD AUTO: 37.1 % (ref 37–48.5)
HGB BLD-MCNC: 12.5 G/DL (ref 12–16)
IMM GRANULOCYTES # BLD AUTO: 0.01 K/UL (ref 0–0.04)
IMM GRANULOCYTES NFR BLD AUTO: 0.2 % (ref 0–0.5)
LYMPHOCYTES # BLD AUTO: 2.3 K/UL (ref 1–4.8)
LYMPHOCYTES NFR BLD: 51.7 % (ref 18–48)
MCH RBC QN AUTO: 32.1 PG (ref 27–31)
MCHC RBC AUTO-ENTMCNC: 33.7 G/DL (ref 32–36)
MCV RBC AUTO: 95 FL (ref 82–98)
MONOCYTES # BLD AUTO: 0.5 K/UL (ref 0.3–1)
MONOCYTES NFR BLD: 12.1 % (ref 4–15)
NEUTROPHILS # BLD AUTO: 1.5 K/UL (ref 1.8–7.7)
NEUTROPHILS NFR BLD: 34.2 % (ref 38–73)
NRBC BLD-RTO: 0 /100 WBC
PLATELET # BLD AUTO: 248 K/UL (ref 150–450)
PMV BLD AUTO: 9.3 FL (ref 9.2–12.9)
POTASSIUM SERPL-SCNC: 4.3 MMOL/L (ref 3.5–5.1)
PROT SERPL-MCNC: 6.4 G/DL (ref 6–8.4)
RBC # BLD AUTO: 3.89 M/UL (ref 4–5.4)
SODIUM SERPL-SCNC: 139 MMOL/L (ref 136–145)
WBC # BLD AUTO: 4.45 K/UL (ref 3.9–12.7)

## 2021-09-13 PROCEDURE — 85652 RBC SED RATE AUTOMATED: CPT | Performed by: PHYSICIAN ASSISTANT

## 2021-09-13 PROCEDURE — 36415 COLL VENOUS BLD VENIPUNCTURE: CPT | Performed by: PHYSICIAN ASSISTANT

## 2021-09-13 PROCEDURE — 80053 COMPREHEN METABOLIC PANEL: CPT | Performed by: PHYSICIAN ASSISTANT

## 2021-09-13 PROCEDURE — 85025 COMPLETE CBC W/AUTO DIFF WBC: CPT | Performed by: PHYSICIAN ASSISTANT

## 2021-09-13 PROCEDURE — 86140 C-REACTIVE PROTEIN: CPT | Performed by: PHYSICIAN ASSISTANT

## 2021-09-14 ENCOUNTER — PATIENT OUTREACH (OUTPATIENT)
Dept: ADMINISTRATIVE | Facility: OTHER | Age: 51
End: 2021-09-14

## 2021-09-14 LAB — ERYTHROCYTE [SEDIMENTATION RATE] IN BLOOD BY WESTERGREN METHOD: <2 MM/HR (ref 0–36)

## 2021-09-15 ENCOUNTER — OFFICE VISIT (OUTPATIENT)
Dept: RHEUMATOLOGY | Facility: CLINIC | Age: 51
End: 2021-09-15
Payer: COMMERCIAL

## 2021-09-15 ENCOUNTER — PATIENT MESSAGE (OUTPATIENT)
Dept: RHEUMATOLOGY | Facility: CLINIC | Age: 51
End: 2021-09-15

## 2021-09-15 ENCOUNTER — TELEPHONE (OUTPATIENT)
Dept: RHEUMATOLOGY | Facility: CLINIC | Age: 51
End: 2021-09-15

## 2021-09-15 VITALS
HEIGHT: 67 IN | HEART RATE: 65 BPM | DIASTOLIC BLOOD PRESSURE: 70 MMHG | SYSTOLIC BLOOD PRESSURE: 111 MMHG | WEIGHT: 145.94 LBS | BODY MASS INDEX: 22.91 KG/M2

## 2021-09-15 DIAGNOSIS — Z79.899 HIGH RISK MEDICATION USE: ICD-10-CM

## 2021-09-15 DIAGNOSIS — M05.79 RHEUMATOID ARTHRITIS INVOLVING MULTIPLE SITES WITH POSITIVE RHEUMATOID FACTOR: Primary | ICD-10-CM

## 2021-09-15 DIAGNOSIS — M19.072 OSTEOARTHRITIS OF FIRST METATARSOPHALANGEAL (MTP) JOINT OF LEFT FOOT: ICD-10-CM

## 2021-09-15 DIAGNOSIS — M79.89 PAIN AND SWELLING OF TOE OF LEFT FOOT: ICD-10-CM

## 2021-09-15 DIAGNOSIS — M19.072 OSTEOARTHRITIS OF FIRST METATARSOPHALANGEAL (MTP) JOINT OF BOTH FEET: ICD-10-CM

## 2021-09-15 DIAGNOSIS — M79.675 PAIN AND SWELLING OF TOE OF LEFT FOOT: ICD-10-CM

## 2021-09-15 DIAGNOSIS — M06.9: ICD-10-CM

## 2021-09-15 DIAGNOSIS — M19.071 OSTEOARTHRITIS OF FIRST METATARSOPHALANGEAL (MTP) JOINT OF BOTH FEET: ICD-10-CM

## 2021-09-15 DIAGNOSIS — D84.9 IMMUNOCOMPROMISED: ICD-10-CM

## 2021-09-15 DIAGNOSIS — M05.772 RHEUMATOID ARTHRITIS INVOLVING LEFT FOOT WITH POSITIVE RHEUMATOID FACTOR: ICD-10-CM

## 2021-09-15 PROCEDURE — 3074F PR MOST RECENT SYSTOLIC BLOOD PRESSURE < 130 MM HG: ICD-10-PCS | Mod: CPTII,S$GLB,, | Performed by: PHYSICIAN ASSISTANT

## 2021-09-15 PROCEDURE — 3078F PR MOST RECENT DIASTOLIC BLOOD PRESSURE < 80 MM HG: ICD-10-PCS | Mod: CPTII,S$GLB,, | Performed by: PHYSICIAN ASSISTANT

## 2021-09-15 PROCEDURE — 3008F PR BODY MASS INDEX (BMI) DOCUMENTED: ICD-10-PCS | Mod: CPTII,S$GLB,, | Performed by: PHYSICIAN ASSISTANT

## 2021-09-15 PROCEDURE — 99214 PR OFFICE/OUTPT VISIT, EST, LEVL IV, 30-39 MIN: ICD-10-PCS | Mod: S$GLB,,, | Performed by: PHYSICIAN ASSISTANT

## 2021-09-15 PROCEDURE — 1160F PR REVIEW ALL MEDS BY PRESCRIBER/CLIN PHARMACIST DOCUMENTED: ICD-10-PCS | Mod: CPTII,S$GLB,, | Performed by: PHYSICIAN ASSISTANT

## 2021-09-15 PROCEDURE — 99999 PR PBB SHADOW E&M-EST. PATIENT-LVL IV: ICD-10-PCS | Mod: PBBFAC,,, | Performed by: PHYSICIAN ASSISTANT

## 2021-09-15 PROCEDURE — 1159F PR MEDICATION LIST DOCUMENTED IN MEDICAL RECORD: ICD-10-PCS | Mod: CPTII,S$GLB,, | Performed by: PHYSICIAN ASSISTANT

## 2021-09-15 PROCEDURE — 1160F RVW MEDS BY RX/DR IN RCRD: CPT | Mod: CPTII,S$GLB,, | Performed by: PHYSICIAN ASSISTANT

## 2021-09-15 PROCEDURE — 99214 OFFICE O/P EST MOD 30 MIN: CPT | Mod: S$GLB,,, | Performed by: PHYSICIAN ASSISTANT

## 2021-09-15 PROCEDURE — 3008F BODY MASS INDEX DOCD: CPT | Mod: CPTII,S$GLB,, | Performed by: PHYSICIAN ASSISTANT

## 2021-09-15 PROCEDURE — 99999 PR PBB SHADOW E&M-EST. PATIENT-LVL IV: CPT | Mod: PBBFAC,,, | Performed by: PHYSICIAN ASSISTANT

## 2021-09-15 PROCEDURE — 3074F SYST BP LT 130 MM HG: CPT | Mod: CPTII,S$GLB,, | Performed by: PHYSICIAN ASSISTANT

## 2021-09-15 PROCEDURE — 1159F MED LIST DOCD IN RCRD: CPT | Mod: CPTII,S$GLB,, | Performed by: PHYSICIAN ASSISTANT

## 2021-09-15 PROCEDURE — 3078F DIAST BP <80 MM HG: CPT | Mod: CPTII,S$GLB,, | Performed by: PHYSICIAN ASSISTANT

## 2021-09-16 ENCOUNTER — PATIENT MESSAGE (OUTPATIENT)
Dept: PHARMACY | Facility: CLINIC | Age: 51
End: 2021-09-16

## 2021-09-20 ENCOUNTER — SPECIALTY PHARMACY (OUTPATIENT)
Dept: PHARMACY | Facility: CLINIC | Age: 51
End: 2021-09-20

## 2021-10-04 ENCOUNTER — HOSPITAL ENCOUNTER (OUTPATIENT)
Dept: RADIOLOGY | Facility: HOSPITAL | Age: 51
Discharge: HOME | End: 2021-10-04
Attending: PHYSICIAN ASSISTANT
Payer: COMMERCIAL

## 2021-10-04 DIAGNOSIS — M79.89 PAIN AND SWELLING OF TOE OF LEFT FOOT: ICD-10-CM

## 2021-10-04 DIAGNOSIS — M19.072 OSTEOARTHRITIS OF FIRST METATARSOPHALANGEAL (MTP) JOINT OF LEFT FOOT: ICD-10-CM

## 2021-10-04 DIAGNOSIS — M79.675 PAIN AND SWELLING OF TOE OF LEFT FOOT: ICD-10-CM

## 2021-10-04 DIAGNOSIS — M05.772 RHEUMATOID ARTHRITIS INVOLVING LEFT FOOT WITH POSITIVE RHEUMATOID FACTOR: ICD-10-CM

## 2021-10-04 PROCEDURE — G1004 MRI FOOT TOES W WO CONTRAST LEFT: ICD-10-PCS | Mod: ,,, | Performed by: RADIOLOGY

## 2021-10-04 PROCEDURE — 73720 MRI FOOT TOES W WO CONTRAST LEFT: ICD-10-PCS | Mod: 26,LT,ME, | Performed by: RADIOLOGY

## 2021-10-04 PROCEDURE — A9585 GADOBUTROL INJECTION: HCPCS | Performed by: PHYSICIAN ASSISTANT

## 2021-10-04 PROCEDURE — 73720 MRI LWR EXTREMITY W/O&W/DYE: CPT | Mod: 26,LT,ME, | Performed by: RADIOLOGY

## 2021-10-04 PROCEDURE — G1004 CDSM NDSC: HCPCS | Mod: ,,, | Performed by: RADIOLOGY

## 2021-10-04 PROCEDURE — 25500020 PHARM REV CODE 255: Performed by: PHYSICIAN ASSISTANT

## 2021-10-04 PROCEDURE — G1004 CDSM NDSC: HCPCS

## 2021-10-04 RX ORDER — GADOBUTROL 604.72 MG/ML
10 INJECTION INTRAVENOUS
Status: COMPLETED | OUTPATIENT
Start: 2021-10-04 | End: 2021-10-04

## 2021-10-04 RX ADMIN — GADOBUTROL 6.5 ML: 604.72 INJECTION INTRAVENOUS at 04:10

## 2021-10-05 ENCOUNTER — PATIENT MESSAGE (OUTPATIENT)
Dept: RHEUMATOLOGY | Facility: CLINIC | Age: 51
End: 2021-10-05

## 2021-10-08 ENCOUNTER — PATIENT MESSAGE (OUTPATIENT)
Dept: RHEUMATOLOGY | Facility: CLINIC | Age: 51
End: 2021-10-08

## 2021-10-08 PROBLEM — Z79.890 HORMONE REPLACEMENT THERAPY (HRT): Status: ACTIVE | Noted: 2021-10-08

## 2021-10-08 LAB — HEMOCCULT STL QL IA: NEGATIVE

## 2021-10-11 ENCOUNTER — LAB VISIT (OUTPATIENT)
Dept: LAB | Facility: HOSPITAL | Age: 51
End: 2021-10-11
Attending: FAMILY MEDICINE
Payer: COMMERCIAL

## 2021-10-11 DIAGNOSIS — D84.9 IMMUNOCOMPROMISED: ICD-10-CM

## 2021-10-11 DIAGNOSIS — Z79.899 HIGH RISK MEDICATION USE: ICD-10-CM

## 2021-10-11 DIAGNOSIS — M05.79 RHEUMATOID ARTHRITIS INVOLVING MULTIPLE SITES WITH POSITIVE RHEUMATOID FACTOR: ICD-10-CM

## 2021-10-11 LAB
ALBUMIN SERPL BCP-MCNC: 4 G/DL (ref 3.5–5.2)
ALP SERPL-CCNC: 85 U/L (ref 55–135)
ALT SERPL W/O P-5'-P-CCNC: 44 U/L (ref 10–44)
ANION GAP SERPL CALC-SCNC: 10 MMOL/L (ref 8–16)
AST SERPL-CCNC: 32 U/L (ref 10–40)
BASOPHILS # BLD AUTO: 0.04 K/UL (ref 0–0.2)
BASOPHILS NFR BLD: 0.8 % (ref 0–1.9)
BILIRUB SERPL-MCNC: 0.4 MG/DL (ref 0.1–1)
BUN SERPL-MCNC: 24 MG/DL (ref 6–20)
CALCIUM SERPL-MCNC: 9.1 MG/DL (ref 8.7–10.5)
CHLORIDE SERPL-SCNC: 106 MMOL/L (ref 95–110)
CO2 SERPL-SCNC: 26 MMOL/L (ref 23–29)
CREAT SERPL-MCNC: 1.1 MG/DL (ref 0.5–1.4)
CRP SERPL-MCNC: 0.1 MG/L (ref 0–8.2)
DIFFERENTIAL METHOD: ABNORMAL
EOSINOPHIL # BLD AUTO: 0.1 K/UL (ref 0–0.5)
EOSINOPHIL NFR BLD: 2.3 % (ref 0–8)
ERYTHROCYTE [DISTWIDTH] IN BLOOD BY AUTOMATED COUNT: 14 % (ref 11.5–14.5)
ERYTHROCYTE [SEDIMENTATION RATE] IN BLOOD BY WESTERGREN METHOD: <2 MM/HR (ref 0–36)
EST. GFR  (AFRICAN AMERICAN): >60 ML/MIN/1.73 M^2
EST. GFR  (NON AFRICAN AMERICAN): 57 ML/MIN/1.73 M^2
GLUCOSE SERPL-MCNC: 121 MG/DL (ref 70–110)
HCT VFR BLD AUTO: 36.1 % (ref 37–48.5)
HGB BLD-MCNC: 12 G/DL (ref 12–16)
IMM GRANULOCYTES # BLD AUTO: 0.01 K/UL (ref 0–0.04)
IMM GRANULOCYTES NFR BLD AUTO: 0.2 % (ref 0–0.5)
LYMPHOCYTES # BLD AUTO: 2 K/UL (ref 1–4.8)
LYMPHOCYTES NFR BLD: 37.5 % (ref 18–48)
MCH RBC QN AUTO: 32.7 PG (ref 27–31)
MCHC RBC AUTO-ENTMCNC: 33.2 G/DL (ref 32–36)
MCV RBC AUTO: 98 FL (ref 82–98)
MONOCYTES # BLD AUTO: 0.4 K/UL (ref 0.3–1)
MONOCYTES NFR BLD: 6.8 % (ref 4–15)
NEUTROPHILS # BLD AUTO: 2.8 K/UL (ref 1.8–7.7)
NEUTROPHILS NFR BLD: 52.4 % (ref 38–73)
NRBC BLD-RTO: 0 /100 WBC
PLATELET # BLD AUTO: 266 K/UL (ref 150–450)
PMV BLD AUTO: 9.1 FL (ref 9.2–12.9)
POTASSIUM SERPL-SCNC: 4.1 MMOL/L (ref 3.5–5.1)
PROT SERPL-MCNC: 6.4 G/DL (ref 6–8.4)
RBC # BLD AUTO: 3.67 M/UL (ref 4–5.4)
SODIUM SERPL-SCNC: 142 MMOL/L (ref 136–145)
WBC # BLD AUTO: 5.33 K/UL (ref 3.9–12.7)

## 2021-10-11 PROCEDURE — 85025 COMPLETE CBC W/AUTO DIFF WBC: CPT | Performed by: PHYSICIAN ASSISTANT

## 2021-10-11 PROCEDURE — 85652 RBC SED RATE AUTOMATED: CPT | Performed by: PHYSICIAN ASSISTANT

## 2021-10-11 PROCEDURE — 86200 CCP ANTIBODY: CPT | Performed by: PHYSICIAN ASSISTANT

## 2021-10-11 PROCEDURE — 36415 COLL VENOUS BLD VENIPUNCTURE: CPT | Performed by: PHYSICIAN ASSISTANT

## 2021-10-11 PROCEDURE — 86140 C-REACTIVE PROTEIN: CPT | Performed by: PHYSICIAN ASSISTANT

## 2021-10-11 PROCEDURE — 86431 RHEUMATOID FACTOR QUANT: CPT | Performed by: PHYSICIAN ASSISTANT

## 2021-10-11 PROCEDURE — 80053 COMPREHEN METABOLIC PANEL: CPT | Performed by: INTERNAL MEDICINE

## 2021-10-12 LAB
CCP AB SER IA-ACNC: 231.1 U/ML
RHEUMATOID FACT SERPL-ACNC: 16 IU/ML (ref 0–15)

## 2021-10-14 ENCOUNTER — OFFICE VISIT (OUTPATIENT)
Dept: RHEUMATOLOGY | Facility: CLINIC | Age: 51
End: 2021-10-14
Payer: COMMERCIAL

## 2021-10-14 ENCOUNTER — TELEPHONE (OUTPATIENT)
Dept: RHEUMATOLOGY | Facility: CLINIC | Age: 51
End: 2021-10-14

## 2021-10-14 ENCOUNTER — PATIENT MESSAGE (OUTPATIENT)
Dept: RHEUMATOLOGY | Facility: CLINIC | Age: 51
End: 2021-10-14

## 2021-10-14 VITALS
BODY MASS INDEX: 22.91 KG/M2 | HEART RATE: 68 BPM | WEIGHT: 145.94 LBS | SYSTOLIC BLOOD PRESSURE: 111 MMHG | DIASTOLIC BLOOD PRESSURE: 71 MMHG | HEIGHT: 67 IN

## 2021-10-14 DIAGNOSIS — Z79.899 LONG TERM CURRENT USE OF IMMUNOSUPPRESSIVE DRUG: Primary | ICD-10-CM

## 2021-10-14 DIAGNOSIS — D84.9 IMMUNOCOMPROMISED: ICD-10-CM

## 2021-10-14 DIAGNOSIS — M05.79 RHEUMATOID ARTHRITIS INVOLVING MULTIPLE SITES WITH POSITIVE RHEUMATOID FACTOR: Primary | ICD-10-CM

## 2021-10-14 DIAGNOSIS — Z79.899 LONG TERM CURRENT USE OF IMMUNOSUPPRESSIVE DRUG: ICD-10-CM

## 2021-10-14 DIAGNOSIS — G57.62 MORTON'S NEUROMA OF LEFT FOOT: ICD-10-CM

## 2021-10-14 DIAGNOSIS — M06.9 FLARE OF RHEUMATOID ARTHRITIS: ICD-10-CM

## 2021-10-14 PROCEDURE — 99215 OFFICE O/P EST HI 40 MIN: CPT | Mod: S$GLB,,, | Performed by: INTERNAL MEDICINE

## 2021-10-14 PROCEDURE — 99215 PR OFFICE/OUTPT VISIT, EST, LEVL V, 40-54 MIN: ICD-10-PCS | Mod: S$GLB,,, | Performed by: INTERNAL MEDICINE

## 2021-10-14 PROCEDURE — 3008F PR BODY MASS INDEX (BMI) DOCUMENTED: ICD-10-PCS | Mod: CPTII,S$GLB,, | Performed by: INTERNAL MEDICINE

## 2021-10-14 PROCEDURE — 3074F PR MOST RECENT SYSTOLIC BLOOD PRESSURE < 130 MM HG: ICD-10-PCS | Mod: CPTII,S$GLB,, | Performed by: INTERNAL MEDICINE

## 2021-10-14 PROCEDURE — 1160F PR REVIEW ALL MEDS BY PRESCRIBER/CLIN PHARMACIST DOCUMENTED: ICD-10-PCS | Mod: CPTII,S$GLB,, | Performed by: INTERNAL MEDICINE

## 2021-10-14 PROCEDURE — 99999 PR PBB SHADOW E&M-EST. PATIENT-LVL III: CPT | Mod: PBBFAC,,, | Performed by: INTERNAL MEDICINE

## 2021-10-14 PROCEDURE — 3008F BODY MASS INDEX DOCD: CPT | Mod: CPTII,S$GLB,, | Performed by: INTERNAL MEDICINE

## 2021-10-14 PROCEDURE — 99999 PR PBB SHADOW E&M-EST. PATIENT-LVL III: ICD-10-PCS | Mod: PBBFAC,,, | Performed by: INTERNAL MEDICINE

## 2021-10-14 PROCEDURE — 1159F PR MEDICATION LIST DOCUMENTED IN MEDICAL RECORD: ICD-10-PCS | Mod: CPTII,S$GLB,, | Performed by: INTERNAL MEDICINE

## 2021-10-14 PROCEDURE — 1160F RVW MEDS BY RX/DR IN RCRD: CPT | Mod: CPTII,S$GLB,, | Performed by: INTERNAL MEDICINE

## 2021-10-14 PROCEDURE — 3078F PR MOST RECENT DIASTOLIC BLOOD PRESSURE < 80 MM HG: ICD-10-PCS | Mod: CPTII,S$GLB,, | Performed by: INTERNAL MEDICINE

## 2021-10-14 PROCEDURE — 3074F SYST BP LT 130 MM HG: CPT | Mod: CPTII,S$GLB,, | Performed by: INTERNAL MEDICINE

## 2021-10-14 PROCEDURE — 1159F MED LIST DOCD IN RCRD: CPT | Mod: CPTII,S$GLB,, | Performed by: INTERNAL MEDICINE

## 2021-10-14 PROCEDURE — 3078F DIAST BP <80 MM HG: CPT | Mod: CPTII,S$GLB,, | Performed by: INTERNAL MEDICINE

## 2021-10-14 RX ORDER — HYDROXYCHLOROQUINE SULFATE 200 MG/1
200 TABLET, FILM COATED ORAL 2 TIMES DAILY
Qty: 60 TABLET | Refills: 6 | Status: SHIPPED | OUTPATIENT
Start: 2021-10-14 | End: 2022-04-18

## 2021-10-19 ENCOUNTER — SPECIALTY PHARMACY (OUTPATIENT)
Dept: PHARMACY | Facility: CLINIC | Age: 51
End: 2021-10-19

## 2021-10-20 ENCOUNTER — PATIENT MESSAGE (OUTPATIENT)
Dept: PODIATRY | Facility: CLINIC | Age: 51
End: 2021-10-20
Payer: COMMERCIAL

## 2021-10-20 ENCOUNTER — PATIENT MESSAGE (OUTPATIENT)
Dept: RHEUMATOLOGY | Facility: CLINIC | Age: 51
End: 2021-10-20
Payer: COMMERCIAL

## 2021-10-26 ENCOUNTER — PATIENT MESSAGE (OUTPATIENT)
Dept: ADMINISTRATIVE | Facility: OTHER | Age: 51
End: 2021-10-26
Payer: COMMERCIAL

## 2021-10-26 ENCOUNTER — PATIENT OUTREACH (OUTPATIENT)
Dept: ADMINISTRATIVE | Facility: OTHER | Age: 51
End: 2021-10-26
Payer: COMMERCIAL

## 2021-11-15 RX ORDER — MELOXICAM 15 MG/1
15 TABLET ORAL DAILY
Qty: 30 TABLET | Refills: 3 | Status: SHIPPED | OUTPATIENT
Start: 2021-11-15 | End: 2022-04-06 | Stop reason: ALTCHOICE

## 2021-11-18 ENCOUNTER — SPECIALTY PHARMACY (OUTPATIENT)
Dept: PHARMACY | Facility: CLINIC | Age: 51
End: 2021-11-18
Payer: COMMERCIAL

## 2021-11-19 ENCOUNTER — PATIENT OUTREACH (OUTPATIENT)
Dept: ADMINISTRATIVE | Facility: HOSPITAL | Age: 51
End: 2021-11-19
Payer: COMMERCIAL

## 2021-11-23 ENCOUNTER — PATIENT MESSAGE (OUTPATIENT)
Dept: RHEUMATOLOGY | Facility: CLINIC | Age: 51
End: 2021-11-23
Payer: COMMERCIAL

## 2021-11-24 RX ORDER — BACLOFEN 10 MG/1
10 TABLET ORAL NIGHTLY PRN
Qty: 30 TABLET | Refills: 0 | Status: SHIPPED | OUTPATIENT
Start: 2021-11-24 | End: 2022-11-11

## 2021-12-18 ENCOUNTER — SPECIALTY PHARMACY (OUTPATIENT)
Dept: PHARMACY | Facility: CLINIC | Age: 51
End: 2021-12-18
Payer: COMMERCIAL

## 2021-12-20 ENCOUNTER — PATIENT OUTREACH (OUTPATIENT)
Dept: ADMINISTRATIVE | Facility: HOSPITAL | Age: 51
End: 2021-12-20
Payer: COMMERCIAL

## 2021-12-21 ENCOUNTER — PATIENT OUTREACH (OUTPATIENT)
Dept: ADMINISTRATIVE | Facility: HOSPITAL | Age: 51
End: 2021-12-21
Payer: COMMERCIAL

## 2021-12-24 ENCOUNTER — PATIENT MESSAGE (OUTPATIENT)
Dept: RHEUMATOLOGY | Facility: CLINIC | Age: 51
End: 2021-12-24
Payer: COMMERCIAL

## 2021-12-24 DIAGNOSIS — M05.79 RHEUMATOID ARTHRITIS INVOLVING MULTIPLE SITES WITH POSITIVE RHEUMATOID FACTOR: Primary | ICD-10-CM

## 2022-01-02 RX ORDER — METHYLPREDNISOLONE 4 MG/1
TABLET ORAL
Qty: 21 TABLET | Refills: 0 | Status: SHIPPED | OUTPATIENT
Start: 2022-01-02 | End: 2022-04-06 | Stop reason: ALTCHOICE

## 2022-01-03 ENCOUNTER — PATIENT MESSAGE (OUTPATIENT)
Dept: RHEUMATOLOGY | Facility: CLINIC | Age: 52
End: 2022-01-03
Payer: COMMERCIAL

## 2022-01-17 ENCOUNTER — PATIENT MESSAGE (OUTPATIENT)
Dept: RHEUMATOLOGY | Facility: CLINIC | Age: 52
End: 2022-01-17
Payer: COMMERCIAL

## 2022-01-18 ENCOUNTER — SPECIALTY PHARMACY (OUTPATIENT)
Dept: PHARMACY | Facility: CLINIC | Age: 52
End: 2022-01-18
Payer: COMMERCIAL

## 2022-01-18 NOTE — TELEPHONE ENCOUNTER
Called patient to be on the look out for an email from [employer] with her copay card. Patient states she will call back once she receives email. Will f/u    ARS

## 2022-01-18 NOTE — TELEPHONE ENCOUNTER
Called patient and [employer] copay card in conference call to enroll in copay card- rep Wel[place].   BIN [id number]  PCN [id number]  GP [id number]  ID [id number]    ARS

## 2022-01-18 NOTE — TELEPHONE ENCOUNTER
Specialty Pharmacy - Refill Coordination    Specialty Medication Orders Linked to Encounter    Flowsheet Row Most Recent Value   Medication #1 RINVOQ 15 mg 24 hr tablet (Order#[id number], Rx#[id number])          Refill Questions - Documented Responses    Flowsheet Row Most Recent Value   Patient Availability and HIPAA Verification    Does patient want to proceed with activity? Yes   HIPAA/medical authority confirmed? Yes   Relationship to patient of person spoken to? Self   Refill Screening Questions    Changes to allergies? No   Changes to medications? No   New conditions since last clinic visit? No   Unplanned office visit, urgent care, ED, or hospital admission in the last 4 weeks? No   How does patient/caregiver feel medication is working? Good   Financial problems or insurance changes? No   How many doses of your specialty medications were missed in the last 4 weeks? 0   Would patient like to speak to a pharmacist? No   When does the patient need to receive the medication? 01/22/22   Refill Delivery Questions    How will the patient receive the medication? Delivery Doris   When does the patient need to receive the medication? 01/22/22   Shipping Address Home   Address in [facility] confirmed and updated if neccessary? Yes   Expected Copay ($) 5557.55   Is the patient able to afford the medication copay? Yes   Payment Method  CC on file   Days supply of Refill 30   Supplies needed? No supplies needed   Refill activity completed? Yes   Refill activity plan Refill scheduled   Shipment/Pickup Date: 01/20/22          Current Outpatient Medications   Medication Sig    ascorbic acid, vitamin C, (VITAMIN C) 1000 MG tablet Take 1,000 mg by mouth.    baclofen (LIORESAL) 10 MG tablet Take 1 tablet (10 mg total) by mouth nightly as needed.    fish oil-omega-3 fatty acids 300-1,000 mg capsule Take 2 g by mouth.    hydrOXYchloroQUINE (PLAQUENIL) 200 mg tablet Take 1 tablet (200 mg total) by mouth 2  (two) times daily.    meloxicam (MOBIC) 15 MG tablet Take 1 tablet (15 mg total) by mouth once daily.    methylPREDNISolone (MEDROL DOSEPACK) 4 mg tablet use as directed    multivitamin (THERAGRAN) per tablet Take 1 tablet by mouth.    predniSONE (DELTASONE) 5 MG tablet Take 1 tablet (5 mg total) by mouth once daily. With food (Patient not taking: Reported on 10/14/2021)    RINVOQ 15 mg 24 hr tablet Take 1 tablet (15 mg total) by mouth once daily.    XIIDRA 5 % Dpet once daily.     YUVAFEM 10 mcg Tab Place 1 tablet vaginally twice a week.   Last reviewed on 10/14/2021  2:55 PM by Alejandro Boswell MD    Review of patient's allergies indicates:   Allergen Reactions    Tetracycline     Last reviewed on  1/2/2022 6:37 PM by Alejandro Boswell      Tasks added this encounter   No tasks added.   Tasks due within next 3 months   1/17/2022 - Refill Call (Auto Added)     [facility]  [address] [place] [id number]  Phone: [phone]  Fax: [phone]

## 2022-02-15 ENCOUNTER — SPECIALTY PHARMACY (OUTPATIENT)
Dept: PHARMACY | Facility: CLINIC | Age: 52
End: 2022-02-15
Payer: COMMERCIAL

## 2022-02-15 DIAGNOSIS — M05.79 RHEUMATOID ARTHRITIS INVOLVING MULTIPLE SITES WITH POSITIVE RHEUMATOID FACTOR: ICD-10-CM

## 2022-02-15 RX ORDER — UPADACITINIB 15 MG/1
15 TABLET, EXTENDED RELEASE ORAL DAILY
Qty: 30 TABLET | Refills: 6 | Status: SHIPPED | OUTPATIENT
Start: 2022-02-15 | End: 2022-09-12 | Stop reason: SDUPTHER

## 2022-02-15 NOTE — TELEPHONE ENCOUNTER
Pt has 5 days on hand. Pt understands refill request has been sent. Pt stated dr Rubio is not there and we are sending it too [facility]. Will follow up with MDO on Thursday 2/17/22

## 2022-02-16 ENCOUNTER — PATIENT MESSAGE (OUTPATIENT)
Dept: RHEUMATOLOGY | Facility: CLINIC | Age: 52
End: 2022-02-16
Payer: COMMERCIAL

## 2022-02-16 NOTE — TELEPHONE ENCOUNTER
Specialty Pharmacy - Refill Coordination    Specialty Medication Orders Linked to Encounter    Flowsheet Row Most Recent Value   Medication #1 RINVOQ 15 mg 24 hr tablet (Order#[id number], Rx#[id number])          Refill Questions - Documented Responses    Flowsheet Row Most Recent Value   Patient Availability and HIPAA Verification    Does patient want to proceed with activity? Yes   HIPAA/medical authority confirmed? Yes   Relationship to patient of person spoken to? Self   Refill Screening Questions    Changes to allergies? No   Changes to medications? No   New conditions since last clinic visit? No   Unplanned office visit, urgent care, ED, or hospital admission in the last 4 weeks? No   How does patient/caregiver feel medication is working? Good   Financial problems or insurance changes? No   How many doses of your specialty medications were missed in the last 4 weeks? 0   Would patient like to speak to a pharmacist? No   When does the patient need to receive the medication? 02/21/22   Refill Delivery Questions    How will the patient receive the medication? Delivery Doris   When does the patient need to receive the medication? 02/21/22   Shipping Address Home   Address in [facility] confirmed and updated if neccessary? Yes   Expected Copay ($) 2428.5   Is the patient able to afford the medication copay? Yes   Payment Method  CC on file   Days supply of Refill 30   Supplies needed? No supplies needed   Refill activity completed? Yes   Refill activity plan Refill scheduled   Shipment/Pickup Date: 02/18/22          Current Outpatient Medications   Medication Sig    ascorbic acid, vitamin C, (VITAMIN C) 1000 MG tablet Take 1,000 mg by mouth.    baclofen (LIORESAL) 10 MG tablet Take 1 tablet (10 mg total) by mouth nightly as needed.    fish oil-omega-3 fatty acids 300-1,000 mg capsule Take 2 g by mouth.    hydrOXYchloroQUINE (PLAQUENIL) 200 mg tablet Take 1 tablet (200 mg total) by mouth 2 (two)  times daily.    meloxicam (MOBIC) 15 MG tablet Take 1 tablet (15 mg total) by mouth once daily.    methylPREDNISolone (MEDROL DOSEPACK) 4 mg tablet use as directed    multivitamin (THERAGRAN) per tablet Take 1 tablet by mouth.    predniSONE (DELTASONE) 5 MG tablet Take 1 tablet (5 mg total) by mouth once daily. With food (Patient not taking: Reported on 10/14/2021)    RINVOQ 15 mg 24 hr tablet Take 1 tablet (15 mg total) by mouth once daily.    XIIDRA 5 % Dpet once daily.     YUVAFEM 10 mcg Tab Place 1 tablet vaginally twice a week.   Last reviewed on 10/14/2021  2:55 PM by Alejandro Boswell MD    Review of patient's allergies indicates:   Allergen Reactions    Tetracycline     Last reviewed on  2/15/2022 9:13 AM by Marilia Cotto      Tasks added this encounter   3/16/2022 - Refill Call (Auto Added)   Tasks due within next 3 months   No tasks due.     Ameena Enrique, PharmD  [facility]  [address] [place] [id number]  Phone: [phone]  Fax: [phone]

## 2022-03-16 ENCOUNTER — SPECIALTY PHARMACY (OUTPATIENT)
Dept: PHARMACY | Facility: CLINIC | Age: 52
End: 2022-03-16
Payer: COMMERCIAL

## 2022-03-16 NOTE — TELEPHONE ENCOUNTER
Specialty Pharmacy - Refill Coordination    Specialty Medication Orders Linked to Encounter    Flowsheet Row Most Recent Value   Medication #1 RINVOQ 15 mg 24 hr tablet (Order#[id number], Rx#[id number])          Refill Questions - Documented Responses    Flowsheet Row Most Recent Value   Patient Availability and HIPAA Verification    Does patient want to proceed with activity? Yes   HIPAA/medical authority confirmed? Yes   Relationship to patient of person spoken to? Self   Refill Screening Questions    Changes to allergies? No   Changes to medications? No   New conditions since last clinic visit? No   Unplanned office visit, urgent care, ED, or hospital admission in the last 4 weeks? No   How does patient/caregiver feel medication is working? Good   Financial problems or insurance changes? No   How many doses of your specialty medications were missed in the last 4 weeks? 0   Would patient like to speak to a pharmacist? No   When does the patient need to receive the medication? 03/21/22   Refill Delivery Questions    How will the patient receive the medication? Delivery Doris   When does the patient need to receive the medication? 03/21/22   Shipping Address Home   Address in [facility] confirmed and updated if neccessary? Yes   Expected Copay ($) 0   Is the patient able to afford the medication copay? Yes   Payment Method zero copay   Days supply of Refill 30   Supplies needed? No supplies needed   Refill activity completed? Yes   Refill activity plan Refill scheduled   Shipment/Pickup Date: 03/18/22          Current Outpatient Medications   Medication Sig    ascorbic acid, vitamin C, (VITAMIN C) 1000 MG tablet Take 1,000 mg by mouth.    baclofen (LIORESAL) 10 MG tablet Take 1 tablet (10 mg total) by mouth nightly as needed.    fish oil-omega-3 fatty acids 300-1,000 mg capsule Take 2 g by mouth.    hydrOXYchloroQUINE (PLAQUENIL) 200 mg tablet Take 1 tablet (200 mg total) by mouth 2 (two) times daily.     meloxicam (MOBIC) 15 MG tablet Take 1 tablet (15 mg total) by mouth once daily.    methylPREDNISolone (MEDROL DOSEPACK) 4 mg tablet use as directed    multivitamin (THERAGRAN) per tablet Take 1 tablet by mouth.    predniSONE (DELTASONE) 5 MG tablet Take 1 tablet (5 mg total) by mouth once daily. With food (Patient not taking: Reported on 10/14/2021)    RINVOQ 15 mg 24 hr tablet Take 1 tablet (15 mg total) by mouth once daily.    XIIDRA 5 % Dpet once daily.     YUVAFEM 10 mcg Tab INSERT ONE TABLET VAGINALLY TWICE A WEEK   Last reviewed on 10/14/2021  2:55 PM by Alejandro Boswell MD    Review of patient's allergies indicates:   Allergen Reactions    Tetracycline     Last reviewed on  2/15/2022 9:13 AM by Marilia Cotto      Tasks added this encounter   No tasks added.   Tasks due within next 3 months   3/16/2022 - Refill Call (Auto Added)     [facility]  [address] [facility] [address]  Phone: [phone]  Fax: [phone]

## 2022-04-04 ENCOUNTER — LAB VISIT (OUTPATIENT)
Dept: LAB | Facility: HOSPITAL | Age: 52
End: 2022-04-04
Attending: INTERNAL MEDICINE
Payer: COMMERCIAL

## 2022-04-04 DIAGNOSIS — Z79.899 LONG TERM CURRENT USE OF IMMUNOSUPPRESSIVE DRUG: ICD-10-CM

## 2022-04-04 LAB
ALBUMIN SERPL BCP-MCNC: 4.2 G/DL (ref 3.5–5.2)
ALP SERPL-CCNC: 68 U/L (ref 55–135)
ALT SERPL W/O P-5'-P-CCNC: 41 U/L (ref 10–44)
ANION GAP SERPL CALC-SCNC: 8 MMOL/L (ref 8–16)
AST SERPL-CCNC: 33 U/L (ref 10–40)
BASOPHILS # BLD AUTO: 0.06 K/UL (ref 0–0.2)
BASOPHILS NFR BLD: 1.4 % (ref 0–1.9)
BILIRUB SERPL-MCNC: 0.3 MG/DL (ref 0.1–1)
BUN SERPL-MCNC: 27 MG/DL (ref 6–20)
CALCIUM SERPL-MCNC: 9.4 MG/DL (ref 8.7–10.5)
CHLORIDE SERPL-SCNC: 107 MMOL/L (ref 95–110)
CO2 SERPL-SCNC: 26 MMOL/L (ref 23–29)
CREAT SERPL-MCNC: 1.7 MG/DL (ref 0.5–1.4)
CRP SERPL-MCNC: <0.1 MG/L (ref 0–8.2)
DIFFERENTIAL METHOD: ABNORMAL
EOSINOPHIL # BLD AUTO: 0.1 K/UL (ref 0–0.5)
EOSINOPHIL NFR BLD: 2.2 % (ref 0–8)
ERYTHROCYTE [DISTWIDTH] IN BLOOD BY AUTOMATED COUNT: 12.4 % (ref 11.5–14.5)
ERYTHROCYTE [SEDIMENTATION RATE] IN BLOOD BY WESTERGREN METHOD: <2 MM/HR (ref 0–36)
EST. GFR  (AFRICAN AMERICAN): 39 ML/MIN/1.73 M^2
EST. GFR  (NON AFRICAN AMERICAN): 34 ML/MIN/1.73 M^2
GLUCOSE SERPL-MCNC: 98 MG/DL (ref 70–110)
HCT VFR BLD AUTO: 39.3 % (ref 37–48.5)
HGB BLD-MCNC: 13.1 G/DL (ref 12–16)
IMM GRANULOCYTES # BLD AUTO: 0.01 K/UL (ref 0–0.04)
IMM GRANULOCYTES NFR BLD AUTO: 0.2 % (ref 0–0.5)
LYMPHOCYTES # BLD AUTO: 1.9 K/UL (ref 1–4.8)
LYMPHOCYTES NFR BLD: 45.9 % (ref 18–48)
MCH RBC QN AUTO: 33 PG (ref 27–31)
MCHC RBC AUTO-ENTMCNC: 33.3 G/DL (ref 32–36)
MCV RBC AUTO: 99 FL (ref 82–98)
MONOCYTES # BLD AUTO: 0.5 K/UL (ref 0.3–1)
MONOCYTES NFR BLD: 12.2 % (ref 4–15)
NEUTROPHILS # BLD AUTO: 1.6 K/UL (ref 1.8–7.7)
NEUTROPHILS NFR BLD: 38.1 % (ref 38–73)
NRBC BLD-RTO: 0 /100 WBC
PLATELET # BLD AUTO: 251 K/UL (ref 150–450)
PMV BLD AUTO: 9.4 FL (ref 9.2–12.9)
POTASSIUM SERPL-SCNC: 4 MMOL/L (ref 3.5–5.1)
PROT SERPL-MCNC: 6.5 G/DL (ref 6–8.4)
RBC # BLD AUTO: 3.97 M/UL (ref 4–5.4)
SODIUM SERPL-SCNC: 141 MMOL/L (ref 136–145)
WBC # BLD AUTO: 4.18 K/UL (ref 3.9–12.7)

## 2022-04-04 PROCEDURE — 80053 COMPREHEN METABOLIC PANEL: CPT | Performed by: INTERNAL MEDICINE

## 2022-04-04 PROCEDURE — 86140 C-REACTIVE PROTEIN: CPT | Performed by: INTERNAL MEDICINE

## 2022-04-04 PROCEDURE — 85025 COMPLETE CBC W/AUTO DIFF WBC: CPT | Performed by: INTERNAL MEDICINE

## 2022-04-04 PROCEDURE — 85652 RBC SED RATE AUTOMATED: CPT | Performed by: INTERNAL MEDICINE

## 2022-04-05 ENCOUNTER — TELEPHONE (OUTPATIENT)
Dept: RHEUMATOLOGY | Facility: CLINIC | Age: 52
End: 2022-04-05
Payer: COMMERCIAL

## 2022-04-06 ENCOUNTER — LAB VISIT (OUTPATIENT)
Dept: LAB | Facility: HOSPITAL | Age: 52
End: 2022-04-06
Attending: FAMILY MEDICINE
Payer: COMMERCIAL

## 2022-04-06 ENCOUNTER — PATIENT OUTREACH (OUTPATIENT)
Dept: ADMINISTRATIVE | Facility: OTHER | Age: 52
End: 2022-04-06
Payer: COMMERCIAL

## 2022-04-06 ENCOUNTER — OFFICE VISIT (OUTPATIENT)
Dept: RHEUMATOLOGY | Facility: CLINIC | Age: 52
End: 2022-04-06
Payer: COMMERCIAL

## 2022-04-06 ENCOUNTER — PATIENT MESSAGE (OUTPATIENT)
Dept: RHEUMATOLOGY | Facility: CLINIC | Age: 52
End: 2022-04-06
Payer: COMMERCIAL

## 2022-04-06 ENCOUNTER — PATIENT MESSAGE (OUTPATIENT)
Dept: RHEUMATOLOGY | Facility: CLINIC | Age: 52
End: 2022-04-06

## 2022-04-06 VITALS
BODY MASS INDEX: 22.59 KG/M2 | DIASTOLIC BLOOD PRESSURE: 71 MMHG | HEART RATE: 74 BPM | HEIGHT: 67 IN | WEIGHT: 143.94 LBS | SYSTOLIC BLOOD PRESSURE: 115 MMHG

## 2022-04-06 DIAGNOSIS — R79.89 ELEVATED SERUM CREATININE: ICD-10-CM

## 2022-04-06 DIAGNOSIS — D84.9 IMMUNOCOMPROMISED: ICD-10-CM

## 2022-04-06 DIAGNOSIS — Z79.899 LONG TERM CURRENT USE OF IMMUNOSUPPRESSIVE DRUG: ICD-10-CM

## 2022-04-06 DIAGNOSIS — M05.79 RHEUMATOID ARTHRITIS INVOLVING MULTIPLE SITES WITH POSITIVE RHEUMATOID FACTOR: Primary | ICD-10-CM

## 2022-04-06 DIAGNOSIS — M10.9 PODAGRA: ICD-10-CM

## 2022-04-06 LAB
ALBUMIN SERPL BCP-MCNC: 4.2 G/DL (ref 3.5–5.2)
ALP SERPL-CCNC: 67 U/L (ref 55–135)
ALT SERPL W/O P-5'-P-CCNC: 38 U/L (ref 10–44)
ANION GAP SERPL CALC-SCNC: 9 MMOL/L (ref 8–16)
AST SERPL-CCNC: 33 U/L (ref 10–40)
BILIRUB SERPL-MCNC: 0.3 MG/DL (ref 0.1–1)
BUN SERPL-MCNC: 25 MG/DL (ref 6–20)
CALCIUM SERPL-MCNC: 9.6 MG/DL (ref 8.7–10.5)
CHLORIDE SERPL-SCNC: 105 MMOL/L (ref 95–110)
CO2 SERPL-SCNC: 26 MMOL/L (ref 23–29)
CREAT SERPL-MCNC: 1.4 MG/DL (ref 0.5–1.4)
EST. GFR  (AFRICAN AMERICAN): 49 ML/MIN/1.73 M^2
EST. GFR  (NON AFRICAN AMERICAN): 43 ML/MIN/1.73 M^2
GLUCOSE SERPL-MCNC: 94 MG/DL (ref 70–110)
POTASSIUM SERPL-SCNC: 4 MMOL/L (ref 3.5–5.1)
PROT SERPL-MCNC: 6.5 G/DL (ref 6–8.4)
SODIUM SERPL-SCNC: 140 MMOL/L (ref 136–145)
URATE SERPL-MCNC: 3.9 MG/DL (ref 2.4–5.7)

## 2022-04-06 PROCEDURE — 99999 PR PBB SHADOW E&M-EST. PATIENT-LVL IV: CPT | Mod: PBBFAC,,, | Performed by: INTERNAL MEDICINE

## 2022-04-06 PROCEDURE — 3008F PR BODY MASS INDEX (BMI) DOCUMENTED: ICD-10-PCS | Mod: CPTII,S$GLB,, | Performed by: INTERNAL MEDICINE

## 2022-04-06 PROCEDURE — 84550 ASSAY OF BLOOD/URIC ACID: CPT | Performed by: INTERNAL MEDICINE

## 2022-04-06 PROCEDURE — 99214 OFFICE O/P EST MOD 30 MIN: CPT | Mod: S$GLB,,, | Performed by: INTERNAL MEDICINE

## 2022-04-06 PROCEDURE — 1160F RVW MEDS BY RX/DR IN RCRD: CPT | Mod: CPTII,S$GLB,, | Performed by: INTERNAL MEDICINE

## 2022-04-06 PROCEDURE — 3074F PR MOST RECENT SYSTOLIC BLOOD PRESSURE < 130 MM HG: ICD-10-PCS | Mod: CPTII,S$GLB,, | Performed by: INTERNAL MEDICINE

## 2022-04-06 PROCEDURE — 3078F DIAST BP <80 MM HG: CPT | Mod: CPTII,S$GLB,, | Performed by: INTERNAL MEDICINE

## 2022-04-06 PROCEDURE — 3078F PR MOST RECENT DIASTOLIC BLOOD PRESSURE < 80 MM HG: ICD-10-PCS | Mod: CPTII,S$GLB,, | Performed by: INTERNAL MEDICINE

## 2022-04-06 PROCEDURE — 3008F BODY MASS INDEX DOCD: CPT | Mod: CPTII,S$GLB,, | Performed by: INTERNAL MEDICINE

## 2022-04-06 PROCEDURE — 3074F SYST BP LT 130 MM HG: CPT | Mod: CPTII,S$GLB,, | Performed by: INTERNAL MEDICINE

## 2022-04-06 PROCEDURE — 1159F MED LIST DOCD IN RCRD: CPT | Mod: CPTII,S$GLB,, | Performed by: INTERNAL MEDICINE

## 2022-04-06 PROCEDURE — 80053 COMPREHEN METABOLIC PANEL: CPT | Performed by: INTERNAL MEDICINE

## 2022-04-06 PROCEDURE — 99214 PR OFFICE/OUTPT VISIT, EST, LEVL IV, 30-39 MIN: ICD-10-PCS | Mod: S$GLB,,, | Performed by: INTERNAL MEDICINE

## 2022-04-06 PROCEDURE — 1160F PR REVIEW ALL MEDS BY PRESCRIBER/CLIN PHARMACIST DOCUMENTED: ICD-10-PCS | Mod: CPTII,S$GLB,, | Performed by: INTERNAL MEDICINE

## 2022-04-06 PROCEDURE — 99999 PR PBB SHADOW E&M-EST. PATIENT-LVL IV: ICD-10-PCS | Mod: PBBFAC,,, | Performed by: INTERNAL MEDICINE

## 2022-04-06 PROCEDURE — 36415 COLL VENOUS BLD VENIPUNCTURE: CPT | Performed by: INTERNAL MEDICINE

## 2022-04-06 PROCEDURE — 1159F PR MEDICATION LIST DOCUMENTED IN MEDICAL RECORD: ICD-10-PCS | Mod: CPTII,S$GLB,, | Performed by: INTERNAL MEDICINE

## 2022-04-06 RX ORDER — LEFLUNOMIDE 10 MG/1
10 TABLET ORAL DAILY
Qty: 30 TABLET | Refills: 1 | Status: SHIPPED | OUTPATIENT
Start: 2022-04-06 | End: 2022-04-25 | Stop reason: SINTOL

## 2022-04-06 NOTE — TELEPHONE ENCOUNTER
Kidney function are at your baseline . Creatinine is 1.4 today . Likely the 1.7 from 4'th was false. Start arava 10 mg daily . You will need labs repeated in 4 weeks for safety monitoring.

## 2022-04-06 NOTE — PROGRESS NOTES
RHEUMATOLOGY CLINIC FOLLOW UP VISIT   Chief complaints, HPI, ROS, EXAM, Assessment & Plans:-  Elizabeth Paulino a 52 y.o. pleasant female comes in for follow up visit.  She follows up in the Rheumatology Clinic for seropositive rheumatoid arthritis.  After failing multiple medications she was started on Rinvoq.  Since she still had mild disease, we started her on Plaquenil.  She reports significant improvement in her stiffness since adding Plaquenil.  Still has worsening chronic foot pain and podagra.  Her orthopedic foot surgeon injected her MTP joints with corticosteroids with mild improvement.   Recent MRI showed Sharma's neuroma.  Rheumatological review of system negative otherwise.  Mild podagra present on exam today.  No synovitis of small joints of bilateral hands or feet.  1. Rheumatoid arthritis involving multiple sites with positive rheumatoid factor    2. Long term current use of immunosuppressive drug    3. Immunocompromised    4. Elevated serum creatinine      Problem List Items Addressed This Visit     Rheumatoid arthritis involving multiple sites with positive rheumatoid factor - Primary (Chronic)    Long term current use of immunosuppressive drug    Immunocompromised    Elevated serum creatinine    Relevant Orders    Comprehensive Metabolic Panel           Active rheumatoid arthritis with mild activity on Rinvoq and plaquenil. Add arava 10 mg daily.   If Arava show significant improvement, we could discontinue Plaquenil and continue Rinvoq.    Compromised immune system secondary to autoimmune disease and use of immunosuppressive drugs. Monitor carefully for infections. Advised to get immediate medical care if any infection. Also advised strict adherence to age appropriate vaccinations and cancer screenings with PCP.   Hold medications if any infection.  Safety labs every 3 months.    # Follow up in about 3 months (around  7/6/2022).    Medication List with Changes/Refills   Current Medications    ASCORBIC ACID, VITAMIN C, (VITAMIN C) 1000 MG TABLET    Take 1,000 mg by mouth.    BACLOFEN (LIORESAL) 10 MG TABLET    Take 1 tablet (10 mg total) by mouth nightly as needed.    FISH OIL-OMEGA-3 FATTY ACIDS 300-1,000 MG CAPSULE    Take 2 g by mouth.    HYDROXYCHLOROQUINE (PLAQUENIL) 200 MG TABLET    Take 1 tablet (200 mg total) by mouth 2 (two) times daily.    MELOXICAM (MOBIC) 15 MG TABLET    Take 1 tablet (15 mg total) by mouth once daily.    MULTIVITAMIN (THERAGRAN) PER TABLET    Take 1 tablet by mouth.    PREDNISONE (DELTASONE) 5 MG TABLET    Take 1 tablet (5 mg total) by mouth once daily. With food    RINVOQ 15 MG 24 HR TABLET    Take 1 tablet (15 mg total) by mouth once daily.    XIIDRA 5 % DPET    once daily.    Discontinued Medications    METHYLPREDNISOLONE (MEDROL DOSEPACK) 4 MG TABLET    use as directed    YUVAFEM 10 MCG TAB    INSERT ONE TABLET VAGINALLY TWICE A WEEK       Past Medical History:   Diagnosis Date    Arthritis     Rheumatoid    COVID-19 10/2020    Cystocele, midline 12/2020    Pelvic floor dysfunction in female 12/2020    Urinary, incontinence, stress female 12/2019       Past Surgical History:   Procedure Laterality Date    breast implant removel      EYE SURGERY      nova sure  2009        Social History     Tobacco Use    Smoking status: Never Smoker    Smokeless tobacco: Never Used   Substance Use Topics    Alcohol use: No    Drug use: No       Family History   Problem Relation Age of Onset    Rheum arthritis Mother     Arrhythmia Father     Heart attack Paternal Grandfather     Breast cancer Paternal Grandmother     Breast cancer Maternal Grandmother        Review of patient's allergies indicates:   Allergen Reactions    Tetracycline        Disclaimer: This note was prepared using voice recognition system and is likely to have sound alike errors and is not proof read.  Please call me with any  questions.

## 2022-04-13 ENCOUNTER — PATIENT MESSAGE (OUTPATIENT)
Dept: PHARMACY | Facility: CLINIC | Age: 52
End: 2022-04-13
Payer: COMMERCIAL

## 2022-04-14 ENCOUNTER — SPECIALTY PHARMACY (OUTPATIENT)
Dept: PHARMACY | Facility: CLINIC | Age: 52
End: 2022-04-14
Payer: COMMERCIAL

## 2022-04-14 NOTE — TELEPHONE ENCOUNTER
Specialty Pharmacy - Refill Coordination    Specialty Medication Orders Linked to Encounter    Flowsheet Row Most Recent Value   Medication #1 RINVOQ 15 mg 24 hr tablet (Order#[id number], Rx#[id number])          Refill Questions - Documented Responses    Flowsheet Row Most Recent Value   Patient Availability and HIPAA Verification    Does patient want to proceed with activity? Yes   HIPAA/medical authority confirmed? Yes   Relationship to patient of person spoken to? Self   Refill Screening Questions    Changes to allergies? No   Changes to medications? No   New conditions since last clinic visit? No   Unplanned office visit, urgent care, ED, or hospital admission in the last 4 weeks? No   How does patient/caregiver feel medication is working? Good   Financial problems or insurance changes? No   How many doses of your specialty medications were missed in the last 4 weeks? 0   Would patient like to speak to a pharmacist? No   When does the patient need to receive the medication? 04/21/22   Refill Delivery Questions    How will the patient receive the medication? Delivery Doris   When does the patient need to receive the medication? 04/21/22   Shipping Address Home   Address in [facility] confirmed and updated if neccessary? Yes   Expected Copay ($) 0   Is the patient able to afford the medication copay? Yes   Payment Method zero copay   Days supply of Refill 30   Supplies needed? No supplies needed   Refill activity completed? Yes   Refill activity plan Refill scheduled   Shipment/Pickup Date: 04/19/22          Current Outpatient Medications   Medication Sig    ascorbic acid, vitamin C, (VITAMIN C) 1000 MG tablet Take 1,000 mg by mouth.    baclofen (LIORESAL) 10 MG tablet Take 1 tablet (10 mg total) by mouth nightly as needed.    fish oil-omega-3 fatty acids 300-1,000 mg capsule Take 2 g by mouth.    hydrOXYchloroQUINE (PLAQUENIL) 200 mg tablet Take 1 tablet (200 mg total) by mouth 2 (two) times daily.     leflunomide (ARAVA) 10 MG Tab Take 1 tablet (10 mg total) by mouth once daily.    multivitamin (THERAGRAN) per tablet Take 1 tablet by mouth.    predniSONE (DELTASONE) 5 MG tablet Take 1 tablet (5 mg total) by mouth once daily. With food (Patient taking differently: Take 5 mg by mouth once daily. prn)    RINVOQ 15 mg 24 hr tablet Take 1 tablet (15 mg total) by mouth once daily.    XIIDRA 5 % Dpet once daily.    Last reviewed on 4/6/2022  5:57 PM by Alejandro Boswell MD    Review of patient's allergies indicates:   Allergen Reactions    Tetracycline     Last reviewed on  4/6/2022 5:57 PM by Alejandro Boswell      Tasks added this encounter   5/14/2022 - Refill Call (Auto Added)   Tasks due within next 3 months   No tasks due.     Cedric Castaneda, PharmD  [facility]  [address] [facility] [address]  Phone: [phone]  Fax: [phone]

## 2022-04-18 ENCOUNTER — PATIENT MESSAGE (OUTPATIENT)
Dept: ADMINISTRATIVE | Facility: OTHER | Age: 52
End: 2022-04-18
Payer: COMMERCIAL

## 2022-04-18 ENCOUNTER — PATIENT MESSAGE (OUTPATIENT)
Dept: RHEUMATOLOGY | Facility: CLINIC | Age: 52
End: 2022-04-18
Payer: COMMERCIAL

## 2022-04-18 DIAGNOSIS — M05.79 RHEUMATOID ARTHRITIS INVOLVING MULTIPLE SITES WITH POSITIVE RHEUMATOID FACTOR: Primary | ICD-10-CM

## 2022-04-25 RX ORDER — AZATHIOPRINE 50 MG/1
50 TABLET ORAL DAILY
Qty: 30 TABLET | Refills: 1 | Status: SHIPPED | OUTPATIENT
Start: 2022-04-25 | End: 2022-05-09 | Stop reason: SINTOL

## 2022-04-27 ENCOUNTER — PATIENT MESSAGE (OUTPATIENT)
Dept: ADMINISTRATIVE | Facility: HOSPITAL | Age: 52
End: 2022-04-27
Payer: COMMERCIAL

## 2022-05-05 ENCOUNTER — PATIENT MESSAGE (OUTPATIENT)
Dept: RHEUMATOLOGY | Facility: CLINIC | Age: 52
End: 2022-05-05
Payer: COMMERCIAL

## 2022-05-05 NOTE — TELEPHONE ENCOUNTER
Pt stating the Imuran is not working for her. Is there a certain amount of time she should wait to see if it works? I know methotrexate is 3-4 weeks. It looks like she only started taking it on 4/25. Please advise. Thanks.

## 2022-05-09 ENCOUNTER — PATIENT MESSAGE (OUTPATIENT)
Dept: RHEUMATOLOGY | Facility: CLINIC | Age: 52
End: 2022-05-09
Payer: COMMERCIAL

## 2022-05-09 NOTE — TELEPHONE ENCOUNTER
Called and advised to discontinue imuran since she has same adverse effects that she had with methotrexate and arava.

## 2022-05-16 ENCOUNTER — PATIENT MESSAGE (OUTPATIENT)
Dept: PHARMACY | Facility: CLINIC | Age: 52
End: 2022-05-16
Payer: COMMERCIAL

## 2022-05-18 ENCOUNTER — SPECIALTY PHARMACY (OUTPATIENT)
Dept: PHARMACY | Facility: CLINIC | Age: 52
End: 2022-05-18
Payer: COMMERCIAL

## 2022-05-18 NOTE — TELEPHONE ENCOUNTER
Specialty Pharmacy - Refill Coordination    Specialty Medication Orders Linked to Encounter    Flowsheet Row Most Recent Value   Medication #1 RINVOQ 15 mg 24 hr tablet (Order#[id number], Rx#[id number])          Refill Questions - Documented Responses    Flowsheet Row Most Recent Value   Patient Availability and HIPAA Verification    Does patient want to proceed with activity? Yes   HIPAA/medical authority confirmed? Yes   Relationship to patient of person spoken to? Self   Refill Screening Questions    Changes to allergies? No   Changes to medications? No   New conditions since last clinic visit? No   Unplanned office visit, urgent care, ED, or hospital admission in the last 4 weeks? No   How does patient/caregiver feel medication is working? Good   Financial problems or insurance changes? No   How many doses of your specialty medications were missed in the last 4 weeks? 0   Would patient like to speak to a pharmacist? No   When does the patient need to receive the medication? 05/24/22   Refill Delivery Questions    How will the patient receive the medication? Delivery Doris   When does the patient need to receive the medication? 05/24/22   Shipping Address Home   Address in [facility] confirmed and updated if neccessary? Yes   Expected Copay ($) 0   Is the patient able to afford the medication copay? Yes   Payment Method zero copay   Days supply of Refill 30   Supplies needed? No supplies needed   Refill activity completed? Yes   Refill activity plan Refill scheduled   Shipment/Pickup Date: 05/20/22          Current Outpatient Medications   Medication Sig    ascorbic acid, vitamin C, (VITAMIN C) 1000 MG tablet Take 1,000 mg by mouth.    baclofen (LIORESAL) 10 MG tablet Take 1 tablet (10 mg total) by mouth nightly as needed.    fish oil-omega-3 fatty acids 300-1,000 mg capsule Take 2 g by mouth.    hydrOXYchloroQUINE (PLAQUENIL) 200 mg tablet TAKE 1 TABLET(200 MG) BY MOUTH TWICE DAILY    multivitamin  (THERAGRAN) per tablet Take 1 tablet by mouth.    predniSONE (DELTASONE) 5 MG tablet Take 1 tablet (5 mg total) by mouth once daily. With food (Patient taking differently: Take 5 mg by mouth once daily. prn)    RINVOQ 15 mg 24 hr tablet Take 1 tablet (15 mg total) by mouth once daily.    XIIDRA 5 % Dpet once daily.    Last reviewed on 5/9/2022  1:18 PM by Alejandro Boswell MD    Review of patient's allergies indicates:   Allergen Reactions    Tetracycline     Last reviewed on  5/9/2022 1:18 PM by Aeljandro Boswell      Tasks added this encounter   6/17/2022 - Refill Call (Auto Added)   Tasks due within next 3 months   No tasks due.     Yovani Johnson, PharmD  [facility]  [address] [place] [id number]  Phone: [phone]  Fax: [phone]

## 2022-05-24 ENCOUNTER — PATIENT MESSAGE (OUTPATIENT)
Dept: RHEUMATOLOGY | Facility: CLINIC | Age: 52
End: 2022-05-24
Payer: COMMERCIAL

## 2022-05-24 ENCOUNTER — TELEPHONE (OUTPATIENT)
Dept: RHEUMATOLOGY | Facility: CLINIC | Age: 52
End: 2022-05-24
Payer: COMMERCIAL

## 2022-06-14 ENCOUNTER — SPECIALTY PHARMACY (OUTPATIENT)
Dept: PHARMACY | Facility: CLINIC | Age: 52
End: 2022-06-14
Payer: COMMERCIAL

## 2022-06-14 NOTE — TELEPHONE ENCOUNTER
Specialty Pharmacy - Clinical Reassessment    Specialty Medication Orders Linked to Encounter    Flowsheet Row Most Recent Value   Medication #1 RINVOQ 15 mg 24 hr tablet (Order#[id number], Rx#[id number])        Patient Diagnosis   M05.79 - Rheumatoid arthritis involving multiple sites with positive rheumatoid factor    Specialty clinical pharmacist review completed for an annual review of reassessment. Reviewed the following areas: current med list, reports of adverse effects, adherence and progress towards therapeutic goals.    Recommendations: Pharmacist will follow up with patient regarding efficacy of current regimen. Pt was taking UPA + LFA + HCQ then d/c'd LFA and added AZA then has recently d/c'd AZA.     Tasks added this encounter   3/14/2023 - Clinical - Follow Up Assesement (Annual)   Tasks due within next 3 months   6/17/2022 - Refill Call (Auto Added)     Katy Crowe, PharmD  [facility]  [address] [facility] [address]  Phone: [phone]  Fax: [phone]

## 2022-06-16 ENCOUNTER — SPECIALTY PHARMACY (OUTPATIENT)
Dept: PHARMACY | Facility: CLINIC | Age: 52
End: 2022-06-16
Payer: COMMERCIAL

## 2022-06-16 NOTE — TELEPHONE ENCOUNTER
Specialty Pharmacy - Refill Coordination    Specialty Medication Orders Linked to Encounter    Flowsheet Row Most Recent Value   Medication #1 RINVOQ 15 mg 24 hr tablet (Order#[id number], Rx#[id number])      Spoke with patient regarding discontinuation of leflunomide and azathioprine. Both medications were causing her to feel worse after trying for the recommended time period. Rinvoq is helping, but she is still having some curling in her hands. She has a follow up appointment in July and they will reassess regimen.    Refill Questions - Documented Responses    Flowsheet Row Most Recent Value   Patient Availability and HIPAA Verification    Does patient want to proceed with activity? Yes   HIPAA/medical authority confirmed? Yes   Relationship to patient of person spoken to? Self   Refill Screening Questions    Changes to allergies? No   Changes to medications? No   New conditions since last clinic visit? No   Unplanned office visit, urgent care, ED, or hospital admission in the last 4 weeks? No   How does patient/caregiver feel medication is working? Good   Financial problems or insurance changes? No   How many doses of your specialty medications were missed in the last 4 weeks? 0   Would patient like to speak to a pharmacist? No   When does the patient need to receive the medication? 06/24/22   Refill Delivery Questions    How will the patient receive the medication? Delivery Doris   When does the patient need to receive the medication? 06/24/22   Address in [facility] confirmed and updated if neccessary? Yes   Expected Copay ($) 0   Is the patient able to afford the medication copay? Yes   Days supply of Refill 30   Supplies needed? No supplies needed   Refill activity completed? Yes   Refill activity plan Refill scheduled   Shipment/Pickup Date: 06/21/22          Current Outpatient Medications   Medication Sig    ascorbic acid, vitamin C, (VITAMIN C) 1000 MG tablet Take 1,000 mg by mouth.    baclofen  (LIORESAL) 10 MG tablet Take 1 tablet (10 mg total) by mouth nightly as needed.    fish oil-omega-3 fatty acids 300-1,000 mg capsule Take 2 g by mouth.    hydrOXYchloroQUINE (PLAQUENIL) 200 mg tablet TAKE 1 TABLET(200 MG) BY MOUTH TWICE DAILY    multivitamin (THERAGRAN) per tablet Take 1 tablet by mouth.    predniSONE (DELTASONE) 5 MG tablet Take 1 tablet (5 mg total) by mouth once daily. With food (Patient taking differently: Take 5 mg by mouth once daily. prn)    RINVOQ 15 mg 24 hr tablet Take 1 tablet (15 mg total) by mouth once daily.    XIIDRA 5 % Dpet once daily.    Last reviewed on 5/9/2022  1:18 PM by Alejandro Boswell MD    Review of patient's allergies indicates:   Allergen Reactions    Tetracycline     Last reviewed on  5/9/2022 1:18 PM by Alejandro Boswell      Tasks added this encounter   7/17/2022 - Refill Call (Auto Added)   Tasks due within next 3 months   No tasks due.     Rehana Davison [facility]  [address] [facility] [address]  Phone: [phone]  Fax: [phone]

## 2022-06-28 ENCOUNTER — TELEPHONE (OUTPATIENT)
Dept: RHEUMATOLOGY | Facility: CLINIC | Age: 52
End: 2022-06-28
Payer: COMMERCIAL

## 2022-06-28 NOTE — TELEPHONE ENCOUNTER
PA for Rinvoq through cover my  Meds:    Key: Key: [id number]    Response: This medication may be excluded from the patient's benefit. For more information, please reach out to [email or web address] directly at [phone]. Message from [employer]: Drug is not covered by plan      Per Pharmacy note on 6/16/2022:      Spoke with patient regarding discontinuation of leflunomide and azathioprine. Both medications were causing her to feel worse after trying for the recommended time period. Rinvoq is helping, but she is still having some curling in her hands. She has a follow up appointment in July and they will reassess regimen.

## 2022-07-07 ENCOUNTER — PATIENT MESSAGE (OUTPATIENT)
Dept: RHEUMATOLOGY | Facility: CLINIC | Age: 52
End: 2022-07-07
Payer: COMMERCIAL

## 2022-07-07 DIAGNOSIS — M05.79 RHEUMATOID ARTHRITIS INVOLVING MULTIPLE SITES WITH POSITIVE RHEUMATOID FACTOR: Primary | ICD-10-CM

## 2022-07-07 NOTE — TELEPHONE ENCOUNTER
LOV and LABS: 04/06/2022     F/u appt with Janie on 07/13/2022     If approved to add CCP to lab, will need order

## 2022-07-11 ENCOUNTER — LAB VISIT (OUTPATIENT)
Dept: LAB | Facility: HOSPITAL | Age: 52
End: 2022-07-11
Attending: FAMILY MEDICINE
Payer: COMMERCIAL

## 2022-07-11 DIAGNOSIS — Z79.899 LONG TERM CURRENT USE OF IMMUNOSUPPRESSIVE DRUG: ICD-10-CM

## 2022-07-11 DIAGNOSIS — M05.79 RHEUMATOID ARTHRITIS INVOLVING MULTIPLE SITES WITH POSITIVE RHEUMATOID FACTOR: ICD-10-CM

## 2022-07-11 LAB
ALBUMIN SERPL BCP-MCNC: 4.2 G/DL (ref 3.5–5.2)
ALP SERPL-CCNC: 47 U/L (ref 55–135)
ALT SERPL W/O P-5'-P-CCNC: 35 U/L (ref 10–44)
ANION GAP SERPL CALC-SCNC: 8 MMOL/L (ref 8–16)
AST SERPL-CCNC: 32 U/L (ref 10–40)
BASOPHILS # BLD AUTO: 0.03 K/UL (ref 0–0.2)
BASOPHILS NFR BLD: 0.7 % (ref 0–1.9)
BILIRUB SERPL-MCNC: 0.6 MG/DL (ref 0.1–1)
BUN SERPL-MCNC: 16 MG/DL (ref 6–20)
CALCIUM SERPL-MCNC: 9 MG/DL (ref 8.7–10.5)
CHLORIDE SERPL-SCNC: 103 MMOL/L (ref 95–110)
CO2 SERPL-SCNC: 26 MMOL/L (ref 23–29)
CREAT SERPL-MCNC: 1.1 MG/DL (ref 0.5–1.4)
CRP SERPL-MCNC: <0.1 MG/L (ref 0–8.2)
DIFFERENTIAL METHOD: ABNORMAL
EOSINOPHIL # BLD AUTO: 0.1 K/UL (ref 0–0.5)
EOSINOPHIL NFR BLD: 1.7 % (ref 0–8)
ERYTHROCYTE [DISTWIDTH] IN BLOOD BY AUTOMATED COUNT: 13.6 % (ref 11.5–14.5)
ERYTHROCYTE [SEDIMENTATION RATE] IN BLOOD BY PHOTOMETRIC METHOD: <2 MM/HR (ref 0–36)
EST. GFR  (AFRICAN AMERICAN): >60 ML/MIN/1.73 M^2
EST. GFR  (NON AFRICAN AMERICAN): 57 ML/MIN/1.73 M^2
GLUCOSE SERPL-MCNC: 84 MG/DL (ref 70–110)
HCT VFR BLD AUTO: 37.9 % (ref 37–48.5)
HGB BLD-MCNC: 12.6 G/DL (ref 12–16)
IMM GRANULOCYTES # BLD AUTO: 0.01 K/UL (ref 0–0.04)
IMM GRANULOCYTES NFR BLD AUTO: 0.2 % (ref 0–0.5)
LYMPHOCYTES # BLD AUTO: 1.5 K/UL (ref 1–4.8)
LYMPHOCYTES NFR BLD: 35.4 % (ref 18–48)
MCH RBC QN AUTO: 31.7 PG (ref 27–31)
MCHC RBC AUTO-ENTMCNC: 33.2 G/DL (ref 32–36)
MCV RBC AUTO: 95 FL (ref 82–98)
MONOCYTES # BLD AUTO: 0.5 K/UL (ref 0.3–1)
MONOCYTES NFR BLD: 12.5 % (ref 4–15)
NEUTROPHILS # BLD AUTO: 2.1 K/UL (ref 1.8–7.7)
NEUTROPHILS NFR BLD: 49.5 % (ref 38–73)
NRBC BLD-RTO: 0 /100 WBC
PLATELET # BLD AUTO: 292 K/UL (ref 150–450)
PMV BLD AUTO: 8.9 FL (ref 9.2–12.9)
POTASSIUM SERPL-SCNC: 4.2 MMOL/L (ref 3.5–5.1)
PROT SERPL-MCNC: 6.3 G/DL (ref 6–8.4)
RBC # BLD AUTO: 3.98 M/UL (ref 4–5.4)
SODIUM SERPL-SCNC: 137 MMOL/L (ref 136–145)
WBC # BLD AUTO: 4.24 K/UL (ref 3.9–12.7)

## 2022-07-11 PROCEDURE — 85025 COMPLETE CBC W/AUTO DIFF WBC: CPT | Performed by: INTERNAL MEDICINE

## 2022-07-11 PROCEDURE — 86140 C-REACTIVE PROTEIN: CPT | Performed by: INTERNAL MEDICINE

## 2022-07-11 PROCEDURE — 86200 CCP ANTIBODY: CPT | Performed by: PHYSICIAN ASSISTANT

## 2022-07-11 PROCEDURE — 36415 COLL VENOUS BLD VENIPUNCTURE: CPT | Performed by: PHYSICIAN ASSISTANT

## 2022-07-11 PROCEDURE — 80053 COMPREHEN METABOLIC PANEL: CPT | Performed by: INTERNAL MEDICINE

## 2022-07-11 PROCEDURE — 85652 RBC SED RATE AUTOMATED: CPT | Performed by: INTERNAL MEDICINE

## 2022-07-12 LAB — CCP AB SER IA-ACNC: 145 U/ML

## 2022-07-13 ENCOUNTER — OFFICE VISIT (OUTPATIENT)
Dept: RHEUMATOLOGY | Facility: CLINIC | Age: 52
End: 2022-07-13
Payer: COMMERCIAL

## 2022-07-13 VITALS
BODY MASS INDEX: 22.56 KG/M2 | WEIGHT: 143.75 LBS | HEIGHT: 67 IN | HEART RATE: 75 BPM | SYSTOLIC BLOOD PRESSURE: 104 MMHG | DIASTOLIC BLOOD PRESSURE: 67 MMHG

## 2022-07-13 DIAGNOSIS — D84.9 IMMUNOCOMPROMISED: ICD-10-CM

## 2022-07-13 DIAGNOSIS — M05.79 RHEUMATOID ARTHRITIS INVOLVING MULTIPLE SITES WITH POSITIVE RHEUMATOID FACTOR: Primary | ICD-10-CM

## 2022-07-13 DIAGNOSIS — Z51.81 MEDICATION MONITORING ENCOUNTER: ICD-10-CM

## 2022-07-13 PROCEDURE — 3078F PR MOST RECENT DIASTOLIC BLOOD PRESSURE < 80 MM HG: ICD-10-PCS | Mod: CPTII,S$GLB,, | Performed by: PHYSICIAN ASSISTANT

## 2022-07-13 PROCEDURE — 1159F MED LIST DOCD IN RCRD: CPT | Mod: CPTII,S$GLB,, | Performed by: PHYSICIAN ASSISTANT

## 2022-07-13 PROCEDURE — 3074F SYST BP LT 130 MM HG: CPT | Mod: CPTII,S$GLB,, | Performed by: PHYSICIAN ASSISTANT

## 2022-07-13 PROCEDURE — 99999 PR PBB SHADOW E&M-EST. PATIENT-LVL III: CPT | Mod: PBBFAC,,, | Performed by: PHYSICIAN ASSISTANT

## 2022-07-13 PROCEDURE — 3008F BODY MASS INDEX DOCD: CPT | Mod: CPTII,S$GLB,, | Performed by: PHYSICIAN ASSISTANT

## 2022-07-13 PROCEDURE — 99999 PR PBB SHADOW E&M-EST. PATIENT-LVL III: ICD-10-PCS | Mod: PBBFAC,,, | Performed by: PHYSICIAN ASSISTANT

## 2022-07-13 PROCEDURE — 3074F PR MOST RECENT SYSTOLIC BLOOD PRESSURE < 130 MM HG: ICD-10-PCS | Mod: CPTII,S$GLB,, | Performed by: PHYSICIAN ASSISTANT

## 2022-07-13 PROCEDURE — 3008F PR BODY MASS INDEX (BMI) DOCUMENTED: ICD-10-PCS | Mod: CPTII,S$GLB,, | Performed by: PHYSICIAN ASSISTANT

## 2022-07-13 PROCEDURE — 3078F DIAST BP <80 MM HG: CPT | Mod: CPTII,S$GLB,, | Performed by: PHYSICIAN ASSISTANT

## 2022-07-13 PROCEDURE — 1159F PR MEDICATION LIST DOCUMENTED IN MEDICAL RECORD: ICD-10-PCS | Mod: CPTII,S$GLB,, | Performed by: PHYSICIAN ASSISTANT

## 2022-07-13 PROCEDURE — 99215 PR OFFICE/OUTPT VISIT, EST, LEVL V, 40-54 MIN: ICD-10-PCS | Mod: S$GLB,,, | Performed by: PHYSICIAN ASSISTANT

## 2022-07-13 PROCEDURE — 99215 OFFICE O/P EST HI 40 MIN: CPT | Mod: S$GLB,,, | Performed by: PHYSICIAN ASSISTANT

## 2022-07-13 RX ORDER — DICLOFENAC SODIUM 20 MG/G
SOLUTION TOPICAL
COMMUNITY
Start: 2022-06-15

## 2022-07-13 RX ORDER — TRIAMCINOLONE ACETONIDE 1 MG/G
CREAM TOPICAL 2 TIMES DAILY
COMMUNITY
Start: 2022-02-24

## 2022-07-13 ASSESSMENT — ROUTINE ASSESSMENT OF PATIENT INDEX DATA (RAPID3): MDHAQ FUNCTION SCORE: 0

## 2022-07-13 NOTE — PROGRESS NOTES
"     RHEUMATOLOGY OUTPATIENT CLINIC NOTE    7/13/2022    Attending Rheumatologist: Janie Smallwood PA-C  Primary Care Provider: Cindy Arguello MD   Chief Complaint/Reason For Consultation:  Disease management      Subjective:        Elizabeth Johns is a 52 y.o. female here today for routine follow up of seropositive rheumatoid arthritis- failed multiple meds but now maintained with rinvoq and HCQ. She has further eliminated sugar from her diet and is getting <10 g of carbs per day. She does feel like this has helped control her RA symptoms as well. She has had improvement in her MTP joint pain. Recent MRI showed Sharma's neuroma.  Rheumatological review of system negative otherwise.  No synovitis of small joints of bilateral hands or feet.    Serologies  Lab Results   Component Value Date    TBGOLDPLUS Negative 12/29/2020      Lab Results   Component Value Date    RF 16.0 (H) 10/11/2021      Lab Results   Component Value Date    HEPBCAB Negative 04/16/2019    HEPCAB Negative 10/10/2013         Current Rheum Medications:  · Rinvoq, prednisone, HCQ  Previous Rheum Medications:   · Imuran, arava, MTX, actemra- neutropenia, low wbc, low plt, elevated lft, orencia, xeljanz, humira, enbrel    Past Medical History:   Diagnosis Date    Arthritis     Rheumatoid    COVID-19 10/2020    Cystocele, midline 12/2020    Pelvic floor dysfunction in female 12/2020    Urinary, incontinence, stress female 12/2019     Social History     Socioeconomic History    Marital status:    Occupational History    Occupation: [employer]   Tobacco Use    Smoking status: Never Smoker    Smokeless tobacco: Never Used   Substance and Sexual Activity    Alcohol use: No    Drug use: No    Sexual activity: Yes     Partners: Male     Review of patient's allergies indicates:   Allergen Reactions    Tetracycline        Objective:   /67   Pulse 75   Ht 5' 7" (1.702 m)   Wt 65.2 kg (143 lb 11.8 oz)   LMP  (LMP Unknown)   " BMI 22.51 kg/m²     Immunization History   Administered Date(s) Administered    DTaP 06/26/2009    Influenza - High Dose - PF (65 years and older) 11/09/2016, 11/14/2017, 11/21/2018    Influenza - Intradermal - Trivalent - PF 11/07/2012    Influenza - Quadrivalent 11/05/2014, 10/19/2015    Influenza Split 10/10/2013    Pneumococcal Conjugate - 13 Valent 12/19/2014    Pneumococcal Polysaccharide - 23 Valent 12/02/2015    Tdap 07/21/2016    Zoster 10/10/2013       Current Outpatient Medications:     ascorbic acid, vitamin C, (VITAMIN C) 1000 MG tablet, Take 1,000 mg by mouth., Disp: , Rfl:     baclofen (LIORESAL) 10 MG tablet, Take 1 tablet (10 mg total) by mouth nightly as needed., Disp: 30 tablet, Rfl: 0    fish oil-omega-3 fatty acids 300-1,000 mg capsule, Take 2 g by mouth., Disp: , Rfl:     hydrOXYchloroQUINE (PLAQUENIL) 200 mg tablet, TAKE 1 TABLET(200 MG) BY MOUTH TWICE DAILY, Disp: 60 tablet, Rfl: 6    multivitamin (THERAGRAN) per tablet, Take 1 tablet by mouth., Disp: , Rfl:     PENNSAID 20 mg/gram /actuation(2 %) sopm, Apply 1 pump to skin twice a day as needed, Disp: , Rfl:     predniSONE (DELTASONE) 5 MG tablet, Take 1 tablet (5 mg total) by mouth once daily. With food (Patient taking differently: Take 5 mg by mouth once daily. prn), Disp: 90 tablet, Rfl: 3    RINVOQ 15 mg 24 hr tablet, Take 1 tablet (15 mg total) by mouth once daily., Disp: 30 tablet, Rfl: 6    triamcinolone acetonide 0.1% (KENALOG) 0.1 % cream, 2 (two) times daily. Apply to affected area, Disp: , Rfl:     XIIDRA 5 % Dpet, once daily. , Disp: , Rfl:        Recent Results (from the past 336 hour(s))   CBC Auto Differential    Collection Time: 07/11/22 11:30 AM   Result Value Ref Range    WBC 4.24 3.90 - 12.70 K/uL    RBC 3.98 (L) 4.00 - 5.40 M/uL    Hemoglobin 12.6 12.0 - 16.0 g/dL    Hematocrit 37.9 37.0 - 48.5 %    MCV 95 82 - 98 fL    MCH 31.7 (H) 27.0 - 31.0 pg    MCHC 33.2 32.0 - 36.0 g/dL    RDW 13.6 11.5 - 14.5 %     Platelets 292 150 - 450 K/uL    MPV 8.9 (L) 9.2 - 12.9 fL    Immature Granulocytes 0.2 0.0 - 0.5 %    Gran # (ANC) 2.1 1.8 - 7.7 K/uL    Immature Grans (Abs) 0.01 0.00 - 0.04 K/uL    Lymph # 1.5 1.0 - 4.8 K/uL    Mono # 0.5 0.3 - 1.0 K/uL    Eos # 0.1 0.0 - 0.5 K/uL    Baso # 0.03 0.00 - 0.20 K/uL    nRBC 0 0 /100 WBC    Gran % 49.5 38.0 - 73.0 %    Lymph % 35.4 18.0 - 48.0 %    Mono % 12.5 4.0 - 15.0 %    Eosinophil % 1.7 0.0 - 8.0 %    Basophil % 0.7 0.0 - 1.9 %    Differential Method Automated    Comprehensive Metabolic Panel    Collection Time: 07/11/22 11:30 AM   Result Value Ref Range    Sodium 137 136 - 145 mmol/L    Potassium 4.2 3.5 - 5.1 mmol/L    Chloride 103 95 - 110 mmol/L    CO2 26 23 - 29 mmol/L    Glucose 84 70 - 110 mg/dL    BUN 16 6 - 20 mg/dL    Creatinine 1.1 0.5 - 1.4 mg/dL    Calcium 9.0 8.7 - 10.5 mg/dL    Total Protein 6.3 6.0 - 8.4 g/dL    Albumin 4.2 3.5 - 5.2 g/dL    Total Bilirubin 0.6 0.1 - 1.0 mg/dL    Alkaline Phosphatase 47 (L) 55 - 135 U/L    AST 32 10 - 40 U/L    ALT 35 10 - 44 U/L    Anion Gap 8 8 - 16 mmol/L    eGFR if African American >60 >60 mL/min/1.73 m^2    eGFR if non African American 57 (A) >60 mL/min/1.73 m^2   C-Reactive Protein    Collection Time: 07/11/22 11:30 AM   Result Value Ref Range    CRP <0.1 0.0 - 8.2 mg/L   Sedimentation rate    Collection Time: 07/11/22 11:30 AM   Result Value Ref Range    Sed Rate <2 0 - 36 mm/Hr   CYCLIC CITRUL PEPTIDE ANTIBODY, IGG    Collection Time: 07/11/22 11:30 AM   Result Value Ref Range    CCP Antibodies 145.0 (H) <5.0 U/mL         Imaging:  All imaging reviewed and independently interpreted by me.    MRI FOOT TOES W WO CONTRAST LEFT 10/4/21  COMPARISON:  Radiographs dated 06/11/2019     FINDINGS:  There is a mild hallux valgus deformity with mild osteoarthritis present at the great toe MTP joint.  There is mild subcutaneous soft tissue edema and enhancement medial to the 1st metatarsal head which may represent developing  adventitial bursitis.  There is a small subcortical cyst present along the medial margins of the metatarsal head which does enhance but is favored to be degenerative in nature.  No definite cortical loss to suggest erosion.  Mild degenerative subchondral cystic change also noted at the 4th TMT joint.  Slightly prominent synovial enhancement noted at the 1st and 2nd MTP joints which is equivocal for low level synovitis.     There is a rounded focus of T1 hypointense/slight T2 hyperintense signal with associated avid enhancement along the plantar surface of the interspace between the 3rd and 4th metatarsal heads which measures approximately 10.6 x 6.5 x 11.8 mm.  Findings are highly suggestive of a Sharma's neuroma.     No evidence of fracture or marrow replacement process.  Visualized portions of the flexor and extensor tendons appear grossly intact.  Visualized plantar musculature demonstrates normal signal.     Impression:     1. Mild hallux valgus deformity with mild associated degenerative changes at the great toe MTP joint.  Some inflammatory changes are present within the subcutaneous soft tissues medial to the 1st metatarsal head which may represent developing adventitial bursitis.  2. Equivocal low level synovitis at the 1st and 2nd MTP joints.  3. Findings highly suggestive of a Sharma's neuroma along the plantar margins of the interface between the 3rd and 4th metatarsal heads as detailed above.  4. Other Mild degenerative findings as above.    Assessment:     1. Rheumatoid arthritis involving multiple sites with positive rheumatoid factor    2. Immunocompromised    3. Medication monitoring encounter          Plan:       · Well controlled seropositive RA on HCQ and rinvoq. 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 Monitor carefully for infections and toxicities- Currently denies issues with recurrent  infections. Advised to get immediate medical care if any infection.  · Recommend Yearly Skin Cancer Screening by Dermatology for all patients on biologic or Hardy. advised strict adherence to age appropriate vaccinations (shingles, pneumonia, flu and covid) and cancer screenings with PCP   · Patient advised to hold DMARD and/or biologic therapy for signs of infection or for surgery. If you are unsure what to do please call our office for instruction.[facility] [phone]  · no current medication related issues, no evidence of toxicity. I ordered labs for toxicity monitoring;  results reviewed and discussed findings with the patient   · Patient understands and contact clinic at any time regarding questions about today's office visit and any medication changes that were made  · Return to clinic: 3 mos w/ lab early    The patient understands, chooses and consents to this plan and accepts all the risks which include but are not limited to the risks mentioned above.     Method of contact with patient concerns: [facility]    60 minutes of total time spent on the encounter, which includes face to face time and non-face to face time preparing to see the patient (eg, review of tests), Obtaining and/or reviewing separately obtained history, Documenting clinical information in the electronic or other health record, Independently interpreting results (not separately reported) and communicating results to the patient/family/caregiver, or Care coordination (not separately reported).          Disclaimer: This note was prepared using a voice recognition system and is likely to have sound alike errors within the text.       Janie Smallwood PA-C  Rheumatology Department   [facility]

## 2022-07-14 ENCOUNTER — SPECIALTY PHARMACY (OUTPATIENT)
Dept: PHARMACY | Facility: CLINIC | Age: 52
End: 2022-07-14
Payer: COMMERCIAL

## 2022-07-14 NOTE — TELEPHONE ENCOUNTER
Incoming call from pt regarding Rinvoq refill. PA is needed. Pt stated she has enough on hand for the rest of this week and next. Pt called to set up refill as she is going out of town next Sunday and wanted to make sure she had her medication before she left. Routed to [facility].

## 2022-07-15 NOTE — TELEPHONE ENCOUNTER
Specialty Pharmacy - Medication/Referral Authorization  Specialty Pharmacy - Refill Coordination    Specialty Medication Orders Linked to Encounter    Flowsheet Row Most Recent Value   Medication #1 RINVOQ 15 mg 24 hr tablet (Order#[id number], Rx#[id number])          Refill Questions - Documented Responses    Flowsheet Row Most Recent Value   Refill Screening Questions    Changes to allergies? No   Changes to medications? No   New conditions since last clinic visit? No   Unplanned office visit, urgent care, ED, or hospital admission in the last 4 weeks? No   How does patient/caregiver feel medication is working? Excellent   Financial problems or insurance changes? No   How many doses of your specialty medications were missed in the last 4 weeks? 0   Would patient like to speak to a pharmacist? No   When does the patient need to receive the medication? 07/23/22   Refill Delivery Questions    How will the patient receive the medication? Delivery Doris   When does the patient need to receive the medication? 07/23/22   Shipping Address Home   Address in [facility] confirmed and updated if neccessary? Yes   Expected Copay ($) 0   Is the patient able to afford the medication copay? Yes   Payment Method zero copay   Days supply of Refill 30   Supplies needed? No supplies needed   Refill activity completed? Yes   Refill activity plan Refill scheduled   Shipment/Pickup Date: 07/15/22          Current Outpatient Medications   Medication Sig    ascorbic acid, vitamin C, (VITAMIN C) 1000 MG tablet Take 1,000 mg by mouth.    baclofen (LIORESAL) 10 MG tablet Take 1 tablet (10 mg total) by mouth nightly as needed.    fish oil-omega-3 fatty acids 300-1,000 mg capsule Take 2 g by mouth.    hydrOXYchloroQUINE (PLAQUENIL) 200 mg tablet TAKE 1 TABLET(200 MG) BY MOUTH TWICE DAILY    multivitamin (THERAGRAN) per tablet Take 1 tablet by mouth.    PENNSAID 20 mg/gram /actuation(2 %) sopm Apply 1 pump to skin twice a day as  needed    predniSONE (DELTASONE) 5 MG tablet Take 1 tablet (5 mg total) by mouth once daily. With food (Patient taking differently: Take 5 mg by mouth once daily. prn)    RINVOQ 15 mg 24 hr tablet Take 1 tablet (15 mg total) by mouth once daily.    triamcinolone acetonide 0.1% (KENALOG) 0.1 % cream 2 (two) times daily. Apply to affected area    XIIDRA 5 % Dpet once daily.    Last reviewed on 7/13/2022  2:29 PM by Sachi Anderson MA    Review of patient's allergies indicates:   Allergen Reactions    Tetracycline     Last reviewed on  7/13/2022 2:28 PM by Sachi Anderson      Tasks added this encounter   8/15/2022 - Refill Call (Auto Added)   Tasks due within next 3 months   No tasks due.     Karen Johnson, PharmD  [facility]  [address] [facility] [address]  Phone: [phone]  Fax: [phone]

## 2022-07-15 NOTE — TELEPHONE ENCOUNTER
DOCUMENTATION ONLY:  Prior authorization for Rinvoq approved from 7/15/2022 to 7/15/2023    Case Id: [id number]    Co-pay: 0.00    2022 BI completed.

## 2022-08-01 ENCOUNTER — PATIENT OUTREACH (OUTPATIENT)
Dept: ADMINISTRATIVE | Facility: HOSPITAL | Age: 52
End: 2022-08-01
Payer: COMMERCIAL

## 2022-08-08 ENCOUNTER — PATIENT OUTREACH (OUTPATIENT)
Dept: ADMINISTRATIVE | Facility: HOSPITAL | Age: 52
End: 2022-08-08
Payer: COMMERCIAL

## 2022-08-15 ENCOUNTER — SPECIALTY PHARMACY (OUTPATIENT)
Dept: PHARMACY | Facility: CLINIC | Age: 52
End: 2022-08-15
Payer: COMMERCIAL

## 2022-08-15 NOTE — TELEPHONE ENCOUNTER
Specialty Pharmacy - Refill Coordination    Specialty Medication Orders Linked to Encounter    Flowsheet Row Most Recent Value   Medication #1 RINVOQ 15 mg 24 hr tablet (Order#[id number], Rx#[id number])          Refill Questions - Documented Responses    Flowsheet Row Most Recent Value   Patient Availability and HIPAA Verification    Does patient want to proceed with activity? Yes   HIPAA/medical authority confirmed? Yes   Relationship to patient of person spoken to? Self   Refill Screening Questions    Changes to allergies? No   Changes to medications? No   New conditions since last clinic visit? No   Unplanned office visit, urgent care, ED, or hospital admission in the last 4 weeks? No   How does patient/caregiver feel medication is working? Excellent   Financial problems or insurance changes? No   How many doses of your specialty medications were missed in the last 4 weeks? 0   Would patient like to speak to a pharmacist? No   When does the patient need to receive the medication? 08/20/22   Refill Delivery Questions    How will the patient receive the medication? Delivery Doris   When does the patient need to receive the medication? 08/20/22   Shipping Address Home   Address in [facility] confirmed and updated if neccessary? Yes   Expected Copay ($) 0   Is the patient able to afford the medication copay? Yes   Payment Method zero copay   Days supply of Refill 30   Supplies needed? No supplies needed   Refill activity completed? Yes   Refill activity plan Refill scheduled   Shipment/Pickup Date: 08/17/22          Current Outpatient Medications   Medication Sig    ascorbic acid, vitamin C, (VITAMIN C) 1000 MG tablet Take 1,000 mg by mouth.    baclofen (LIORESAL) 10 MG tablet Take 1 tablet (10 mg total) by mouth nightly as needed.    fish oil-omega-3 fatty acids 300-1,000 mg capsule Take 2 g by mouth.    hydrOXYchloroQUINE (PLAQUENIL) 200 mg tablet TAKE 1 TABLET(200 MG) BY MOUTH TWICE DAILY     multivitamin (THERAGRAN) per tablet Take 1 tablet by mouth.    PENNSAID 20 mg/gram /actuation(2 %) sopm Apply 1 pump to skin twice a day as needed    predniSONE (DELTASONE) 5 MG tablet Take 1 tablet (5 mg total) by mouth once daily. With food (Patient taking differently: Take 5 mg by mouth once daily. prn)    RINVOQ 15 mg 24 hr tablet Take 1 tablet (15 mg total) by mouth once daily.    triamcinolone acetonide 0.1% (KENALOG) 0.1 % cream 2 (two) times daily. Apply to affected area    XIIDRA 5 % Dpet once daily.    Last reviewed on 7/13/2022  2:29 PM by Sachi Anderson MA    Review of patient's allergies indicates:   Allergen Reactions    Tetracycline     Last reviewed on  7/13/2022 2:28 PM by Sachi Anderson      Tasks added this encounter   9/12/2022 - Refill Call (Auto Added)   Tasks due within next 3 months   No tasks due.     Trini Fonseca, PharmD  [facility]  [address] [place] [id number]  Phone: [phone]  Fax: [phone]

## 2022-09-12 ENCOUNTER — SPECIALTY PHARMACY (OUTPATIENT)
Dept: PHARMACY | Facility: CLINIC | Age: 52
End: 2022-09-12
Payer: COMMERCIAL

## 2022-09-12 DIAGNOSIS — M05.79 RHEUMATOID ARTHRITIS INVOLVING MULTIPLE SITES WITH POSITIVE RHEUMATOID FACTOR: ICD-10-CM

## 2022-09-12 RX ORDER — UPADACITINIB 15 MG/1
15 TABLET, EXTENDED RELEASE ORAL DAILY
Qty: 30 TABLET | Refills: 6 | Status: SHIPPED | OUTPATIENT
Start: 2022-09-12 | End: 2023-04-13 | Stop reason: SDUPTHER

## 2022-09-12 NOTE — TELEPHONE ENCOUNTER
Specialty Pharmacy - Refill Coordination    Specialty Medication Orders Linked to Encounter      Flowsheet Row Most Recent Value   Medication #1 RINVOQ 15 mg 24 hr tablet (Order#[id number], Rx#[id number])            Refill Questions - Documented Responses      Flowsheet Row Most Recent Value   Patient Availability and HIPAA Verification    Does patient want to proceed with activity? Yes   HIPAA/medical authority confirmed? Yes   Relationship to patient of person spoken to? Self   Refill Screening Questions    Changes to allergies? No   Changes to medications? No   New conditions since last clinic visit? No   Unplanned office visit, urgent care, ED, or hospital admission in the last 4 weeks? No   How does patient/caregiver feel medication is working? Very good   Financial problems or insurance changes? No   How many doses of your specialty medications were missed in the last 4 weeks? 0   Would patient like to speak to a pharmacist? No   When does the patient need to receive the medication? 09/17/22   Refill Delivery Questions    How will the patient receive the medication? Delivery Doris   When does the patient need to receive the medication? 09/17/22   Shipping Address Home   Address in [facility] confirmed and updated if neccessary? Yes   Expected Copay ($) 0   Is the patient able to afford the medication copay? Yes   Payment Method zero copay   Days supply of Refill 30   Supplies needed? No supplies needed   Refill activity completed? Yes   Refill activity plan Refill scheduled   Shipment/Pickup Date: 09/14/22            Current Outpatient Medications   Medication Sig    ascorbic acid, vitamin C, (VITAMIN C) 1000 MG tablet Take 1,000 mg by mouth.    baclofen (LIORESAL) 10 MG tablet Take 1 tablet (10 mg total) by mouth nightly as needed.    fish oil-omega-3 fatty acids 300-1,000 mg capsule Take 2 g by mouth.    hydrOXYchloroQUINE (PLAQUENIL) 200 mg tablet TAKE 1 TABLET(200 MG) BY MOUTH TWICE DAILY     multivitamin (THERAGRAN) per tablet Take 1 tablet by mouth.    PENNSAID 20 mg/gram /actuation(2 %) sopm Apply 1 pump to skin twice a day as needed    predniSONE (DELTASONE) 5 MG tablet Take 1 tablet (5 mg total) by mouth once daily. With food (Patient taking differently: Take 5 mg by mouth once daily. prn)    RINVOQ 15 mg 24 hr tablet Take 1 tablet (15 mg total) by mouth once daily.    triamcinolone acetonide 0.1% (KENALOG) 0.1 % cream 2 (two) times daily. Apply to affected area    XIIDRA 5 % Dpet once daily.    Last reviewed on 7/13/2022  2:29 PM by Sachi Anderson MA    Review of patient's allergies indicates:   Allergen Reactions    Tetracycline     Last reviewed on  9/12/2022 8:46 AM by Ragini Ellis      Tasks added this encounter   10/10/2022 - Refill Call (Auto Added)   Tasks due within next 3 months   No tasks due.     [facility]  [address] [facility] [address]  Phone: [phone]  Fax: [phone]

## 2022-09-15 ENCOUNTER — PATIENT MESSAGE (OUTPATIENT)
Dept: RHEUMATOLOGY | Facility: CLINIC | Age: 52
End: 2022-09-15
Payer: COMMERCIAL

## 2022-09-18 ENCOUNTER — PATIENT MESSAGE (OUTPATIENT)
Dept: RHEUMATOLOGY | Facility: CLINIC | Age: 52
End: 2022-09-18
Payer: COMMERCIAL

## 2022-09-19 NOTE — TELEPHONE ENCOUNTER
Patient fell and broke her wrist in 2 places. Janie recommended for her to hold her Rinvoq at least one week prior to surgery but surgery is scheduled for Tomorrow. When should she resume the Rinvoq post surgery?

## 2022-09-20 NOTE — TELEPHONE ENCOUNTER
Peer to peer completed for patient. Denial reason for Bilateral C3/4 and C4/5 facet joint injections: Cervical Radiculopathy must be addressed prior to approval for medial branch RFA.     BM     
none

## 2022-09-21 ENCOUNTER — TELEPHONE (OUTPATIENT)
Dept: RHEUMATOLOGY | Facility: CLINIC | Age: 52
End: 2022-09-21

## 2022-09-21 ENCOUNTER — OFFICE VISIT (OUTPATIENT)
Dept: RHEUMATOLOGY | Facility: CLINIC | Age: 52
End: 2022-09-21
Payer: COMMERCIAL

## 2022-09-21 DIAGNOSIS — Z79.52 LONG TERM SYSTEMIC STEROID USER: ICD-10-CM

## 2022-09-21 DIAGNOSIS — D84.9 IMMUNOCOMPROMISED: ICD-10-CM

## 2022-09-21 DIAGNOSIS — M05.79 RHEUMATOID ARTHRITIS INVOLVING MULTIPLE SITES WITH POSITIVE RHEUMATOID FACTOR: Primary | ICD-10-CM

## 2022-09-21 DIAGNOSIS — Z51.81 MEDICATION MONITORING ENCOUNTER: ICD-10-CM

## 2022-09-21 DIAGNOSIS — S62.109S CLOSED FRACTURE OF WRIST, UNSPECIFIED LATERALITY, SEQUELA: ICD-10-CM

## 2022-09-21 PROCEDURE — 99214 PR OFFICE/OUTPT VISIT, EST, LEVL IV, 30-39 MIN: ICD-10-PCS | Mod: 95,,, | Performed by: PHYSICIAN ASSISTANT

## 2022-09-21 PROCEDURE — 99214 OFFICE O/P EST MOD 30 MIN: CPT | Mod: 95,,, | Performed by: PHYSICIAN ASSISTANT

## 2022-09-21 NOTE — TELEPHONE ENCOUNTER
----- Message from Sachi Anderson MA sent at 9/21/2022  4:08 PM CDT -----    ----- Message -----  From: Janie Smallwood PA-C  Sent: 9/21/2022   3:09 PM CDT  To: [facility] Staff    Please schedule her for dexa and vit D level on either 10/14 or 10/28. TY

## 2022-09-21 NOTE — PROGRESS NOTES
RHEUMATOLOGY OUTPATIENT CLINIC NOTE    9/21/2022    Attending Rheumatologist: Janie Smallwood PA-C  Primary Care Provider: Cindy Arguello MD   Chief Complaint/Reason For Consultation:  Disease management/ new fracture      Subjective:      The patient location is: [place]  The chief complaint leading to consultation is: new fracture    Visit type: audiovisual    Face to Face time with patient: 10 min  30 minutes of total time spent on the encounter, which includes face to face time and non-face to face time preparing to see the patient (eg, review of tests), Obtaining and/or reviewing separately obtained history, Documenting clinical information in the electronic or other health record, Independently interpreting results (not separately reported) and communicating results to the patient/family/caregiver, or Care coordination (not separately reported).     Each patient to whom he or she provides medical services by telemedicine is:  (1) informed of the relationship between the physician and patient and the respective role of any other health care provider with respect to management of the patient; and (2) notified that he or she may decline to receive medical services by telemedicine and may withdraw from such care at any time.    Notes:        Elizabeth Johns is a 52 y.o. female seen today via telemed for eval OP and new fracture of the wrist after a fall. Had surgery yesterday and is holding rinvoq. No prior dexa scans, no recent vit D level. Slipped getting out of bathtub. + family hx of OP.    seen routinely for follow up of seropositive rheumatoid arthritis- failed multiple meds but now maintained with rinvoq and HCQ. She has further eliminated sugar from her diet and is getting <10 g of carbs per day. She does feel like this has helped control her RA symptoms as well. She has had improvement in her MTP joint pain. Recent MRI showed Sharma's neuroma.  Rheumatological review of system negative otherwise.  No  synovitis of small joints of bilateral hands or feet.    Serologies  Lab Results   Component Value Date    TBGOLDPLUS Negative 12/29/2020     Lab Results   Component Value Date    RF 16.0 (H) 10/11/2021     Lab Results   Component Value Date    HEPBCAB Negative 04/16/2019    HEPCAB Negative 10/10/2013        Current Rheum Medications:  Rinvoq, prednisone, HCQ  Previous Rheum Medications:   Imuran, arava, MTX, actemra- neutropenia, low wbc, low plt, elevated lft, orencia, xeljanz, humira, enbrel    Past Medical History:   Diagnosis Date    Arthritis     Rheumatoid    COVID-19 10/2020    Cystocele, midline 12/2020    Pelvic floor dysfunction in female 12/2020    Urinary, incontinence, stress female 12/2019     Social History     Socioeconomic History    Marital status:    Occupational History    Occupation: [employer]   Tobacco Use    Smoking status: Never    Smokeless tobacco: Never   Substance and Sexual Activity    Alcohol use: No    Drug use: No    Sexual activity: Yes     Partners: Male     Review of patient's allergies indicates:   Allergen Reactions    Tetracycline        Objective:   LMP  (LMP Unknown)     Immunization History   Administered Date(s) Administered    DTaP 06/26/2009    Influenza - High Dose - PF (65 years and older) 11/09/2016, 11/14/2017, 11/21/2018    Influenza - Intradermal - Trivalent - PF 11/07/2012    Influenza - Quadrivalent 11/05/2014, 10/19/2015    Influenza Split 10/10/2013    Pneumococcal Conjugate - 13 Valent 12/19/2014    Pneumococcal Polysaccharide - 23 Valent 12/02/2015    Tdap 07/21/2016    Zoster 10/10/2013       Current Outpatient Medications:     ascorbic acid, vitamin C, (VITAMIN C) 1000 MG tablet, Take 1,000 mg by mouth., Disp: , Rfl:     baclofen (LIORESAL) 10 MG tablet, Take 1 tablet (10 mg total) by mouth nightly as needed., Disp: 30 tablet, Rfl: 0    fish oil-omega-3 fatty acids 300-1,000 mg capsule, Take 2 g by mouth., Disp: , Rfl:     hydrOXYchloroQUINE  (PLAQUENIL) 200 mg tablet, TAKE 1 TABLET(200 MG) BY MOUTH TWICE DAILY, Disp: 60 tablet, Rfl: 6    multivitamin (THERAGRAN) per tablet, Take 1 tablet by mouth., Disp: , Rfl:     PENNSAID 20 mg/gram /actuation(2 %) sopm, Apply 1 pump to skin twice a day as needed, Disp: , Rfl:     predniSONE (DELTASONE) 5 MG tablet, Take 1 tablet (5 mg total) by mouth once daily. With food (Patient taking differently: Take 5 mg by mouth once daily. prn), Disp: 90 tablet, Rfl: 3    RINVOQ 15 mg 24 hr tablet, Take 1 tablet (15 mg total) by mouth once daily., Disp: 30 tablet, Rfl: 6    triamcinolone acetonide 0.1% (KENALOG) 0.1 % cream, 2 (two) times daily. Apply to affected area, Disp: , Rfl:     XIIDRA 5 % Dpet, once daily. , Disp: , Rfl:     No results found for this or any previous visit (from the past 336 hour(s)).    Imaging:  All imaging reviewed and independently interpreted by me.    MRI FOOT TOES W WO CONTRAST LEFT 10/4/21  COMPARISON:  Radiographs dated 06/11/2019     FINDINGS:  There is a mild hallux valgus deformity with mild osteoarthritis present at the great toe MTP joint.  There is mild subcutaneous soft tissue edema and enhancement medial to the 1st metatarsal head which may represent developing adventitial bursitis.  There is a small subcortical cyst present along the medial margins of the metatarsal head which does enhance but is favored to be degenerative in nature.  No definite cortical loss to suggest erosion.  Mild degenerative subchondral cystic change also noted at the 4th TMT joint.  Slightly prominent synovial enhancement noted at the 1st and 2nd MTP joints which is equivocal for low level synovitis.     There is a rounded focus of T1 hypointense/slight T2 hyperintense signal with associated avid enhancement along the plantar surface of the interspace between the 3rd and 4th metatarsal heads which measures approximately 10.6 x 6.5 x 11.8 mm.  Findings are highly suggestive of a Sharma's neuroma.     No  evidence of fracture or marrow replacement process.  Visualized portions of the flexor and extensor tendons appear grossly intact.  Visualized plantar musculature demonstrates normal signal.     Impression:     1. Mild hallux valgus deformity with mild associated degenerative changes at the great toe MTP joint.  Some inflammatory changes are present within the subcutaneous soft tissues medial to the 1st metatarsal head which may represent developing adventitial bursitis.  2. Equivocal low level synovitis at the 1st and 2nd MTP joints.  3. Findings highly suggestive of a Sharma's neuroma along the plantar margins of the interface between the 3rd and 4th metatarsal heads as detailed above.  4. Other Mild degenerative findings as above.    Assessment:     1. Rheumatoid arthritis involving multiple sites with positive rheumatoid factor    2. Immunocompromised    3. Medication monitoring encounter    4. Long term systemic steroid user    5. Closed fracture of wrist, unspecified laterality, sequela          Plan:       Well controlled seropositive RA on HCQ and rinvoq. Continue anti-inflammatory dietary changes  Schedule DEXA and vitamin D level check. Will likely start evenity   Compromised immune system secondary to autoimmune disease and use of immunosuppressive drugs. Monitor carefully for infections and toxicities- Currently denies issues with recurrent infections. Advised to get immediate medical care if any infection.  Recommend Yearly Skin Cancer Screening by Dermatology for all patients on biologic or Hardy. advised strict adherence to age appropriate vaccinations (shingles, pneumonia, flu and covid) and cancer screenings with PCP   Patient advised to hold DMARD and/or biologic therapy for signs of infection or for surgery. If you are unsure what to do please call our office for instruction.[facility] [phone]  no current medication related issues, no evidence of toxicity. I ordered labs for  toxicity monitoring;  results reviewed and discussed findings with the patient   Patient understands and contact clinic at any time regarding questions about today's office visit and any medication changes that were made  Return to clinic: 3 mos w/ lab early    The patient understands, chooses and consents to this plan and accepts all the risks which include but are not limited to the risks mentioned above.     Method of contact with patient concerns: [facility]         Disclaimer: This note was prepared using a voice recognition system and is likely to have sound alike errors within the text.       JANAK SteelC  Rheumatology Department   [facility]

## 2022-10-04 ENCOUNTER — PATIENT MESSAGE (OUTPATIENT)
Dept: RHEUMATOLOGY | Facility: CLINIC | Age: 52
End: 2022-10-04
Payer: COMMERCIAL

## 2022-10-10 ENCOUNTER — SPECIALTY PHARMACY (OUTPATIENT)
Dept: PHARMACY | Facility: CLINIC | Age: 52
End: 2022-10-10
Payer: COMMERCIAL

## 2022-10-10 NOTE — TELEPHONE ENCOUNTER
Patient was advised to pause Rinvoq for 2 weeks after surgery due to breaking her wrist. She was also advised to reinitiate starting this past week. She does not know the exact amount of tablets she has on hand, but she is comfortable with rescheduling refill call for 10/18 and will call back if she finds she has less than expected on hand.     Routing to pharmacist for awareness

## 2022-10-11 ENCOUNTER — PATIENT MESSAGE (OUTPATIENT)
Dept: RHEUMATOLOGY | Facility: CLINIC | Age: 52
End: 2022-10-11
Payer: COMMERCIAL

## 2022-10-18 ENCOUNTER — PATIENT MESSAGE (OUTPATIENT)
Dept: RHEUMATOLOGY | Facility: CLINIC | Age: 52
End: 2022-10-18
Payer: COMMERCIAL

## 2022-10-18 DIAGNOSIS — M05.79 RHEUMATOID ARTHRITIS INVOLVING MULTIPLE SITES WITH POSITIVE RHEUMATOID FACTOR: Primary | ICD-10-CM

## 2022-10-18 NOTE — TELEPHONE ENCOUNTER
Specialty Pharmacy - Refill Coordination    Specialty Medication Orders Linked to Encounter      Flowsheet Row Most Recent Value   Medication #1 RINVOQ 15 mg 24 hr tablet (Order#[id number], Rx#[id number])            Refill Questions - Documented Responses      Flowsheet Row Most Recent Value   Patient Availability and HIPAA Verification    Does patient want to proceed with activity? Yes   HIPAA/medical authority confirmed? Yes   Relationship to patient of person spoken to? Self   Refill Screening Questions    Changes to allergies? No   Changes to medications? No   New conditions since last clinic visit? No   Unplanned office visit, urgent care, ED, or hospital admission in the last 4 weeks? No   How does patient/caregiver feel medication is working? Very good   Financial problems or insurance changes? No   How many doses of your specialty medications were missed in the last 4 weeks? 0   Would patient like to speak to a pharmacist? No   When does the patient need to receive the medication? 10/25/22   Refill Delivery Questions    How will the patient receive the medication? [facility]   When does the patient need to receive the medication? 10/25/22   Shipping Address Home   Address in [facility] confirmed and updated if neccessary? Yes   Expected Copay ($) 0   Is the patient able to afford the medication copay? Yes   Payment Method zero copay   Days supply of Refill 30   Supplies needed? No supplies needed   Refill activity completed? Yes   Refill activity plan Refill scheduled   Shipment/Pickup Date: 10/19/22            Current Outpatient Medications   Medication Sig    ascorbic acid, vitamin C, (VITAMIN C) 1000 MG tablet Take 1,000 mg by mouth.    baclofen (LIORESAL) 10 MG tablet Take 1 tablet (10 mg total) by mouth nightly as needed.    fish oil-omega-3 fatty acids 300-1,000 mg capsule Take 2 g by mouth.    hydrOXYchloroQUINE (PLAQUENIL) 200 mg tablet TAKE 1 TABLET(200 MG) BY MOUTH TWICE DAILY    multivitamin  (THERAGRAN) per tablet Take 1 tablet by mouth.    PENNSAID 20 mg/gram /actuation(2 %) sopm Apply 1 pump to skin twice a day as needed    predniSONE (DELTASONE) 5 MG tablet Take 1 tablet (5 mg total) by mouth once daily. With food (Patient taking differently: Take 5 mg by mouth once daily. prn)    RINVOQ 15 mg 24 hr tablet Take 1 tablet (15 mg total) by mouth once daily.    triamcinolone acetonide 0.1% (KENALOG) 0.1 % cream 2 (two) times daily. Apply to affected area    XIIDRA 5 % Dpet once daily.    Last reviewed on 7/13/2022  2:29 PM by Sachi Anderson MA    Review of patient's allergies indicates:   Allergen Reactions    Tetracycline     Last reviewed on  9/21/2022 3:09 PM by Janie Smallwood      Tasks added this encounter   11/17/2022 - Refill Call (Auto Added)   Tasks due within next 3 months   No tasks due.     [facility]  [address] [facility] [address]  Phone: [phone]  Fax: [phone]

## 2022-10-24 ENCOUNTER — PATIENT MESSAGE (OUTPATIENT)
Dept: RHEUMATOLOGY | Facility: CLINIC | Age: 52
End: 2022-10-24
Payer: COMMERCIAL

## 2022-11-05 ENCOUNTER — PATIENT MESSAGE (OUTPATIENT)
Dept: RHEUMATOLOGY | Facility: CLINIC | Age: 52
End: 2022-11-05
Payer: COMMERCIAL

## 2022-11-08 ENCOUNTER — APPOINTMENT (OUTPATIENT)
Dept: RADIOLOGY | Facility: HOSPITAL | Age: 52
End: 2022-11-08
Attending: PHYSICIAN ASSISTANT
Payer: COMMERCIAL

## 2022-11-08 DIAGNOSIS — Z79.52 LONG TERM SYSTEMIC STEROID USER: ICD-10-CM

## 2022-11-08 PROCEDURE — 77080 DXA BONE DENSITY AXIAL: CPT | Mod: 26,,, | Performed by: RADIOLOGY

## 2022-11-08 PROCEDURE — 77080 DEXA BONE DENSITY SPINE HIP: ICD-10-PCS | Mod: 26,,, | Performed by: RADIOLOGY

## 2022-11-08 PROCEDURE — 77080 DXA BONE DENSITY AXIAL: CPT | Mod: TC

## 2022-11-14 NOTE — PROGRESS NOTES
RHEUMATOLOGY OUTPATIENT CLINIC NOTE    Attending Rheumatologist: Janie Smallwood PA-C  Primary Care Provider: Cindy Arguello MD   Chief Complaint/Reason For Consultation:  Disease management/ new fracture      Subjective:        Elizabeth Johns is a 52 y.o. female seen today for f/u left wrist fracture w/ normal healing- slipped when getting out of the bathtub. DEXA also shows osteoporosis. She has never been on treatment before. In general she prefers not to take or rely on medications and prefers a holistic approach to her health care. She does feel a little more inflamed overall today. Dr. Childers routinely tracked her CCP levels. + family hx of OP.    Also seen routinely for follow up of seropositive rheumatoid arthritis- failed multiple meds but now maintained with rinvoq and HCQ. She has had some intermittent foot pain as well but overall still able to maintain all of her daily activities and exercise.  Rheumatological review of system negative otherwise. No skin rashes, no dactylitis.    Serologies  Lab Results   Component Value Date    TBGOLDPLUS Negative 12/29/2020     Lab Results   Component Value Date    RF 16.0 (H) 10/11/2021     Lab Results   Component Value Date    HEPBCAB Negative 04/16/2019    HEPCAB Negative 10/10/2013        Current Rheum Medications:  Rinvoq, prednisone, HCQ  Previous Rheum Medications:   Imuran, arava, MTX, actemra- neutropenia, low wbc, low plt, elevated lft, orencia, xeljanz, humira, enbrel    Past Medical History:   Diagnosis Date    Arthritis     Rheumatoid    COVID-19 10/2020    Cystocele, midline 12/2020    Pelvic floor dysfunction in female 12/2020    Urinary, incontinence, stress female 12/2019     Social History     Tobacco Use    Smoking status: Never    Smokeless tobacco: Never   Substance Use Topics    Alcohol use: No       Review of patient's allergies indicates:   Allergen Reactions    Tetracycline        Objective:   Ht 5' (1.524 m)   Wt 67.5 kg (148 lb  13 oz)   LMP  (LMP Unknown)   BMI 29.06 kg/m²     Immunization History   Administered Date(s) Administered    DTaP 06/26/2009    Influenza - High Dose - PF (65 years and older) 11/09/2016, 11/14/2017, 11/21/2018    Influenza - Intradermal - Trivalent - PF 11/07/2012    Influenza - Quadrivalent 11/05/2014, 10/19/2015    Influenza Split 10/10/2013    Pneumococcal Conjugate - 13 Valent 12/19/2014    Pneumococcal Polysaccharide - 23 Valent 12/02/2015    Tdap 07/21/2016    Zoster 10/10/2013       Current Outpatient Medications:     ascorbic acid, vitamin C, (VITAMIN C) 1000 MG tablet, Take 1,000 mg by mouth., Disp: , Rfl:     COLACE 100 mg capsule, Take 100 mg by mouth 2 (two) times daily., Disp: , Rfl:     econazole nitrate 1 % cream, APPLY TOPICALLY TO THE AFFECTED AREA TWICE DAILY FOR 14 DAYS AS NEEDED FOR RINGWORM, Disp: , Rfl:     fish oil-omega-3 fatty acids 300-1,000 mg capsule, Take 2 g by mouth., Disp: , Rfl:     hydrocortisone 2.5 % cream, APPLY TO THE IRRITATED BUMPS ON CHEST TWICE DAILY AS NEEDED FOR UP TO 2 WEEKS., Disp: , Rfl:     hydrOXYchloroQUINE (PLAQUENIL) 200 mg tablet, TAKE 1 TABLET(200 MG) BY MOUTH TWICE DAILY, Disp: 60 tablet, Rfl: 6    multivitamin (THERAGRAN) per tablet, Take 1 tablet by mouth., Disp: , Rfl:     oxyCODONE-acetaminophen (PERCOCET) 5-325 mg per tablet, Take 1 tablet by mouth every 6 (six) hours as needed., Disp: , Rfl:     PENNSAID 20 mg/gram /actuation(2 %) sopm, Apply 1 pump to skin twice a day as needed, Disp: , Rfl:     predniSONE (DELTASONE) 5 MG tablet, Take 1 tablet (5 mg total) by mouth once daily. With food (Patient taking differently: Take 5 mg by mouth once daily. prn), Disp: 90 tablet, Rfl: 3    RINVOQ 15 mg 24 hr tablet, Take 1 tablet (15 mg total) by mouth once daily., Disp: 30 tablet, Rfl: 6    tretinoin (RETIN-A) 0.025 % cream, , Disp: , Rfl:     triamcinolone acetonide 0.1% (KENALOG) 0.1 % cream, 2 (two) times daily. Apply to affected area, Disp: , Rfl:      XIIDRA 5 % Dpet, once daily. , Disp: , Rfl:     Recent Results (from the past 336 hour(s))   CBC Auto Differential    Collection Time: 11/08/22 10:05 AM   Result Value Ref Range    WBC 4.20 3.90 - 12.70 K/uL    RBC 4.12 4.00 - 5.40 M/uL    Hemoglobin 12.9 12.0 - 16.0 g/dL    Hematocrit 39.9 37.0 - 48.5 %    MCV 97 82 - 98 fL    MCH 31.3 (H) 27.0 - 31.0 pg    MCHC 32.3 32.0 - 36.0 g/dL    RDW 13.0 11.5 - 14.5 %    Platelets 265 150 - 450 K/uL    MPV 9.2 9.2 - 12.9 fL    Immature Granulocytes 0.2 0.0 - 0.5 %    Gran # (ANC) 1.6 (L) 1.8 - 7.7 K/uL    Immature Grans (Abs) 0.01 0.00 - 0.04 K/uL    Lymph # 1.7 1.0 - 4.8 K/uL    Mono # 0.4 0.3 - 1.0 K/uL    Eos # 0.5 0.0 - 0.5 K/uL    Baso # 0.05 0.00 - 0.20 K/uL    nRBC 0 0 /100 WBC    Gran % 37.2 (L) 38.0 - 73.0 %    Lymph % 39.3 18.0 - 48.0 %    Mono % 9.5 4.0 - 15.0 %    Eosinophil % 12.6 (H) 0.0 - 8.0 %    Basophil % 1.2 0.0 - 1.9 %    Differential Method Automated    C-Reactive Protein    Collection Time: 11/08/22 10:05 AM   Result Value Ref Range    CRP <0.1 0.0 - 8.2 mg/L   Sedimentation rate    Collection Time: 11/08/22 10:05 AM   Result Value Ref Range    Sed Rate <2 0 - 36 mm/Hr   Comprehensive Metabolic Panel    Collection Time: 11/08/22 10:05 AM   Result Value Ref Range    Sodium 142 136 - 145 mmol/L    Potassium 4.4 3.5 - 5.1 mmol/L    Chloride 108 95 - 110 mmol/L    CO2 24 23 - 29 mmol/L    Glucose 73 70 - 110 mg/dL    BUN 22 (H) 6 - 20 mg/dL    Creatinine 1.2 0.5 - 1.4 mg/dL    Calcium 9.3 8.7 - 10.5 mg/dL    Total Protein 6.6 6.0 - 8.4 g/dL    Albumin 4.1 3.5 - 5.2 g/dL    Total Bilirubin 0.5 0.1 - 1.0 mg/dL    Alkaline Phosphatase 64 55 - 135 U/L    AST 37 10 - 40 U/L    ALT 40 10 - 44 U/L    Anion Gap 10 8 - 16 mmol/L    eGFR 54 (A) >60 mL/min/1.73 m^2   Vitamin D    Collection Time: 11/08/22 10:05 AM   Result Value Ref Range    Vit D, 25-Hydroxy 98 (H) 30 - 96 ng/mL   CYCLIC CITRUL PEPTIDE ANTIBODY, IGG    Collection Time: 11/08/22 10:05 AM   Result  Value Ref Range    CCP Antibodies 161.1 (H) <5.0 U/mL       Imaging:  All imaging reviewed and independently interpreted by me.    MRI FOOT TOES W WO CONTRAST LEFT 10/4/21  COMPARISON:  Radiographs dated 06/11/2019     FINDINGS:  There is a mild hallux valgus deformity with mild osteoarthritis present at the great toe MTP joint.  There is mild subcutaneous soft tissue edema and enhancement medial to the 1st metatarsal head which may represent developing adventitial bursitis.  There is a small subcortical cyst present along the medial margins of the metatarsal head which does enhance but is favored to be degenerative in nature.  No definite cortical loss to suggest erosion.  Mild degenerative subchondral cystic change also noted at the 4th TMT joint.  Slightly prominent synovial enhancement noted at the 1st and 2nd MTP joints which is equivocal for low level synovitis.     There is a rounded focus of T1 hypointense/slight T2 hyperintense signal with associated avid enhancement along the plantar surface of the interspace between the 3rd and 4th metatarsal heads which measures approximately 10.6 x 6.5 x 11.8 mm.  Findings are highly suggestive of a Sharma's neuroma.     No evidence of fracture or marrow replacement process.  Visualized portions of the flexor and extensor tendons appear grossly intact.  Visualized plantar musculature demonstrates normal signal.     Impression:     1. Mild hallux valgus deformity with mild associated degenerative changes at the great toe MTP joint.  Some inflammatory changes are present within the subcutaneous soft tissues medial to the 1st metatarsal head which may represent developing adventitial bursitis.  2. Equivocal low level synovitis at the 1st and 2nd MTP joints.  3. Findings highly suggestive of a Sharma's neuroma along the plantar margins of the interface between the 3rd and 4th metatarsal heads as detailed above.  4. Other Mild degenerative findings as above.    Results for  orders placed in visit on 11/08/22    DXA Bone Density Spine And Hip    Narrative  EXAMINATION:  DEXA BONE DENSITY SPINE HIP    CLINICAL HISTORY:  Long term (current) use of systemic steroids    TECHNIQUE:  DXA scanning was performed over the left hip and lumbar spine.  Review of the images confirms satisfactory positioning and technique.    COMPARISON:  None    FINDINGS:  The L1 to L4 vertebral bone mineral density is equal to 0.839 g/cm squared with a T score of -2.9.  The left femoral neck bone mineral density is equal to 0.764 g/cm squared with a T score of -2.0.  The total hip bone mineral density is equal to 0.747 g/cm squared with a T score of -2.1.    Impression  Osteoporosis    Assessment:     1. Rheumatoid arthritis involving multiple sites with positive rheumatoid factor    2. Immunocompromised    3. Medication monitoring encounter    4. Osteoporosis without current pathological fracture, unspecified osteoporosis type            Plan:       Well controlled seropositive RA on HCQ and rinvoq. Continue anti-inflammatory dietary changes  DEXA scan results reviewed with patient; she is unsure how she would like to proceed with treatment at this time. Recommended evenity x1 year in light of recent fracture. Patient provided with information on osteoporosis and treatment as well as drug information on Evenity.  Compromised immune system secondary to autoimmune disease and use of immunosuppressive drugs. Monitor carefully for infections and toxicities- Currently denies issues with recurrent infections. Advised to get immediate medical care if any infection.  Recommend Yearly Skin Cancer Screening by Dermatology for all patients on biologic or Hardy. advised strict adherence to age appropriate vaccinations (shingles, pneumonia, flu and covid) and cancer screenings with PCP   Patient advised to hold DMARD and/or biologic therapy for signs of infection or for surgery. If you are unsure what to do please call our  office for instruction.[facility] [phone]  no current medication related issues, no evidence of toxicity. I ordered labs for toxicity monitoring;  results reviewed and discussed findings with the patient   Patient understands and contact clinic at any time regarding questions about today's office visit and any medication changes that were made  Return to clinic: 3 mos w/ lab early w/ Dr DURANT    The patient understands, chooses and consents to this plan and accepts all the risks which include but are not limited to the risks mentioned above.     Method of contact with patient concerns: [facility]    40 minutes of total time spent on the encounter, which includes face to face time and non-face to face time preparing to see the patient (eg, review of tests), Obtaining and/or reviewing separately obtained history, Documenting clinical information in the electronic or other health record, Independently interpreting results (not separately reported) and communicating results to the patient/family/caregiver, or Care coordination (not separately reported).         Disclaimer: This note was prepared using a voice recognition system and is likely to have sound alike errors within the text.       Janie Smallwood PA-C  Rheumatology Department   [facility]

## 2022-11-15 ENCOUNTER — OFFICE VISIT (OUTPATIENT)
Dept: RHEUMATOLOGY | Facility: CLINIC | Age: 52
End: 2022-11-15
Payer: COMMERCIAL

## 2022-11-15 ENCOUNTER — PATIENT MESSAGE (OUTPATIENT)
Dept: RHEUMATOLOGY | Facility: CLINIC | Age: 52
End: 2022-11-15

## 2022-11-15 VITALS
SYSTOLIC BLOOD PRESSURE: 102 MMHG | HEART RATE: 70 BPM | HEIGHT: 60 IN | WEIGHT: 148.81 LBS | DIASTOLIC BLOOD PRESSURE: 61 MMHG | BODY MASS INDEX: 29.22 KG/M2

## 2022-11-15 DIAGNOSIS — Z51.81 MEDICATION MONITORING ENCOUNTER: ICD-10-CM

## 2022-11-15 DIAGNOSIS — M05.79 RHEUMATOID ARTHRITIS INVOLVING MULTIPLE SITES WITH POSITIVE RHEUMATOID FACTOR: Primary | ICD-10-CM

## 2022-11-15 DIAGNOSIS — M81.0 OSTEOPOROSIS WITHOUT CURRENT PATHOLOGICAL FRACTURE, UNSPECIFIED OSTEOPOROSIS TYPE: ICD-10-CM

## 2022-11-15 DIAGNOSIS — D84.9 IMMUNOCOMPROMISED: ICD-10-CM

## 2022-11-15 PROCEDURE — 3074F PR MOST RECENT SYSTOLIC BLOOD PRESSURE < 130 MM HG: ICD-10-PCS | Mod: CPTII,S$GLB,, | Performed by: PHYSICIAN ASSISTANT

## 2022-11-15 PROCEDURE — 99215 OFFICE O/P EST HI 40 MIN: CPT | Mod: S$GLB,,, | Performed by: PHYSICIAN ASSISTANT

## 2022-11-15 PROCEDURE — 3078F PR MOST RECENT DIASTOLIC BLOOD PRESSURE < 80 MM HG: ICD-10-PCS | Mod: CPTII,S$GLB,, | Performed by: PHYSICIAN ASSISTANT

## 2022-11-15 PROCEDURE — 99999 PR PBB SHADOW E&M-EST. PATIENT-LVL IV: ICD-10-PCS | Mod: PBBFAC,,, | Performed by: PHYSICIAN ASSISTANT

## 2022-11-15 PROCEDURE — 3008F PR BODY MASS INDEX (BMI) DOCUMENTED: ICD-10-PCS | Mod: CPTII,S$GLB,, | Performed by: PHYSICIAN ASSISTANT

## 2022-11-15 PROCEDURE — 3078F DIAST BP <80 MM HG: CPT | Mod: CPTII,S$GLB,, | Performed by: PHYSICIAN ASSISTANT

## 2022-11-15 PROCEDURE — 3008F BODY MASS INDEX DOCD: CPT | Mod: CPTII,S$GLB,, | Performed by: PHYSICIAN ASSISTANT

## 2022-11-15 PROCEDURE — 99215 PR OFFICE/OUTPT VISIT, EST, LEVL V, 40-54 MIN: ICD-10-PCS | Mod: S$GLB,,, | Performed by: PHYSICIAN ASSISTANT

## 2022-11-15 PROCEDURE — 99999 PR PBB SHADOW E&M-EST. PATIENT-LVL IV: CPT | Mod: PBBFAC,,, | Performed by: PHYSICIAN ASSISTANT

## 2022-11-15 PROCEDURE — 1159F MED LIST DOCD IN RCRD: CPT | Mod: CPTII,S$GLB,, | Performed by: PHYSICIAN ASSISTANT

## 2022-11-15 PROCEDURE — 1159F PR MEDICATION LIST DOCUMENTED IN MEDICAL RECORD: ICD-10-PCS | Mod: CPTII,S$GLB,, | Performed by: PHYSICIAN ASSISTANT

## 2022-11-15 PROCEDURE — 3074F SYST BP LT 130 MM HG: CPT | Mod: CPTII,S$GLB,, | Performed by: PHYSICIAN ASSISTANT

## 2022-11-15 NOTE — Clinical Note
Dr DURANT- I am asking that this patient follow up with you, at least for her next few visits. Overally seems very untrusting of my clinical judgement/recommendations

## 2022-11-17 ENCOUNTER — SPECIALTY PHARMACY (OUTPATIENT)
Dept: PHARMACY | Facility: CLINIC | Age: 52
End: 2022-11-17
Payer: COMMERCIAL

## 2022-11-17 NOTE — TELEPHONE ENCOUNTER
Specialty Pharmacy - Refill Coordination    Specialty Medication Orders Linked to Encounter      Flowsheet Row Most Recent Value   Medication #1 RINVOQ 15 mg 24 hr tablet (Order#[id number], Rx#[id number])            Refill Questions - Documented Responses      Flowsheet Row Most Recent Value   Patient Availability and HIPAA Verification    Does patient want to proceed with activity? Yes   HIPAA/medical authority confirmed? Yes   Relationship to patient of person spoken to? Self   Refill Screening Questions    Changes to allergies? No   Changes to medications? No   New conditions since last clinic visit? No   Unplanned office visit, urgent care, ED, or hospital admission in the last 4 weeks? No   How does patient/caregiver feel medication is working? Good   Financial problems or insurance changes? No   How many doses of your specialty medications were missed in the last 4 weeks? 0   Would patient like to speak to a pharmacist? No   When does the patient need to receive the medication? 11/23/22   Refill Delivery Questions    How will the patient receive the medication? MEDRx   When does the patient need to receive the medication? 11/23/22   Shipping Address Home   Address in [facility] confirmed and updated if neccessary? Yes   Expected Copay ($) 0   Is the patient able to afford the medication copay? Yes   Payment Method zero copay   Days supply of Refill 30   Supplies needed? No supplies needed   Refill activity completed? Yes   Refill activity plan Refill scheduled   Shipment/Pickup Date: 11/21/22            Current Outpatient Medications   Medication Sig    ascorbic acid, vitamin C, (VITAMIN C) 1000 MG tablet Take 1,000 mg by mouth.    COLACE 100 mg capsule Take 100 mg by mouth 2 (two) times daily.    econazole nitrate 1 % cream APPLY TOPICALLY TO THE AFFECTED AREA TWICE DAILY FOR 14 DAYS AS NEEDED FOR RINGWORM    fish oil-omega-3 fatty acids 300-1,000 mg capsule Take 2 g by mouth.    hydrocortisone 2.5 %  cream APPLY TO THE IRRITATED BUMPS ON CHEST TWICE DAILY AS NEEDED FOR UP TO 2 WEEKS.    hydrOXYchloroQUINE (PLAQUENIL) 200 mg tablet TAKE 1 TABLET(200 MG) BY MOUTH TWICE DAILY    multivitamin (THERAGRAN) per tablet Take 1 tablet by mouth.    oxyCODONE-acetaminophen (PERCOCET) 5-325 mg per tablet Take 1 tablet by mouth every 6 (six) hours as needed.    PENNSAID 20 mg/gram /actuation(2 %) sopm Apply 1 pump to skin twice a day as needed    predniSONE (DELTASONE) 5 MG tablet Take 1 tablet (5 mg total) by mouth once daily. With food (Patient taking differently: Take 5 mg by mouth once daily. prn)    RINVOQ 15 mg 24 hr tablet Take 1 tablet (15 mg total) by mouth once daily.    tretinoin (RETIN-A) 0.025 % cream     triamcinolone acetonide 0.1% (KENALOG) 0.1 % cream 2 (two) times daily. Apply to affected area    XIIDRA 5 % Dpet once daily.    Last reviewed on 11/15/2022 11:13 AM by Sachi Anderson MA    Review of patient's allergies indicates:   Allergen Reactions    Tetracycline     Last reviewed on  11/15/2022 11:12 AM by Sachi Anderson      Tasks added this encounter   12/16/2022 - Refill Call (Auto Added)   Tasks due within next 3 months   No tasks due.     [facility] - Specialty Pharmacy  [address] [facility] [facility] [address]  Phone: [phone]  Fax: [phone]

## 2022-12-07 ENCOUNTER — PATIENT MESSAGE (OUTPATIENT)
Dept: RHEUMATOLOGY | Facility: CLINIC | Age: 52
End: 2022-12-07
Payer: COMMERCIAL

## 2022-12-09 ENCOUNTER — PATIENT MESSAGE (OUTPATIENT)
Dept: RHEUMATOLOGY | Facility: CLINIC | Age: 52
End: 2022-12-09
Payer: COMMERCIAL

## 2022-12-09 DIAGNOSIS — M05.79 RHEUMATOID ARTHRITIS INVOLVING MULTIPLE SITES WITH POSITIVE RHEUMATOID FACTOR: ICD-10-CM

## 2022-12-09 RX ORDER — HYDROXYCHLOROQUINE SULFATE 200 MG/1
TABLET, FILM COATED ORAL
Qty: 180 TABLET | Refills: 3 | Status: SHIPPED | OUTPATIENT
Start: 2022-12-09 | End: 2023-10-12

## 2022-12-09 NOTE — TELEPHONE ENCOUNTER
Please let her know Dr Stevens will be starting at [facility] this month. I do not have any additional details. Also see if she has given any more thought to treating her osteoporosis and let me know

## 2022-12-16 ENCOUNTER — SPECIALTY PHARMACY (OUTPATIENT)
Dept: PHARMACY | Facility: CLINIC | Age: 52
End: 2022-12-16
Payer: COMMERCIAL

## 2022-12-16 NOTE — TELEPHONE ENCOUNTER
Specialty Pharmacy - Refill Coordination    Specialty Medication Orders Linked to Encounter      Flowsheet Row Most Recent Value   Medication #1 RINVOQ 15 mg 24 hr tablet (Order#[id number], Rx#[id number])            Refill Questions - Documented Responses      Flowsheet Row Most Recent Value   Patient Availability and HIPAA Verification    Does patient want to proceed with activity? Yes   HIPAA/medical authority confirmed? Yes   Relationship to patient of person spoken to? Self   Refill Screening Questions    Changes to allergies? No   Changes to medications? No   New conditions since last clinic visit? No   Unplanned office visit, urgent care, ED, or hospital admission in the last 4 weeks? No   How does patient/caregiver feel medication is working? Good   Financial problems or insurance changes? No   How many doses of your specialty medications were missed in the last 4 weeks? 0   Would patient like to speak to a pharmacist? No   When does the patient need to receive the medication? 12/22/22   Refill Delivery Questions    How will the patient receive the medication? MEDRx   When does the patient need to receive the medication? 12/22/22   Shipping Address Home   Address in [facility] confirmed and updated if neccessary? Yes   Expected Copay ($) 0   Is the patient able to afford the medication copay? Yes   Payment Method zero copay   Days supply of Refill 30   Supplies needed? No supplies needed   Refill activity completed? Yes   Refill activity plan Refill scheduled   Shipment/Pickup Date: 12/20/22            Current Outpatient Medications   Medication Sig    ascorbic acid, vitamin C, (VITAMIN C) 1000 MG tablet Take 1,000 mg by mouth.    COLACE 100 mg capsule Take 100 mg by mouth 2 (two) times daily.    econazole nitrate 1 % cream APPLY TOPICALLY TO THE AFFECTED AREA TWICE DAILY FOR 14 DAYS AS NEEDED FOR RINGWORM    fish oil-omega-3 fatty acids 300-1,000 mg capsule Take 2 g by mouth.    fluconazole (DIFLUCAN)  150 MG Tab Take 1 tablet now and repeat in 4 days.    hydrocortisone 2.5 % cream APPLY TO THE IRRITATED BUMPS ON CHEST TWICE DAILY AS NEEDED FOR UP TO 2 WEEKS.    hydrOXYchloroQUINE (PLAQUENIL) 200 mg tablet TAKE 1 TABLET(200 MG) BY MOUTH TWICE DAILY    multivitamin (THERAGRAN) per tablet Take 1 tablet by mouth.    oxyCODONE-acetaminophen (PERCOCET) 5-325 mg per tablet Take 1 tablet by mouth every 6 (six) hours as needed.    PENNSAID 20 mg/gram /actuation(2 %) sopm Apply 1 pump to skin twice a day as needed    predniSONE (DELTASONE) 5 MG tablet Take 1 tablet (5 mg total) by mouth once daily. With food (Patient taking differently: Take 5 mg by mouth once daily. prn)    RINVOQ 15 mg 24 hr tablet Take 1 tablet (15 mg total) by mouth once daily.    tretinoin (RETIN-A) 0.025 % cream     triamcinolone acetonide 0.1% (KENALOG) 0.1 % cream 2 (two) times daily. Apply to affected area    XIIDRA 5 % Dpet once daily.    Last reviewed on 11/15/2022 11:13 AM by Sachi Anderson MA    Review of patient's allergies indicates:   Allergen Reactions    Tetracycline     Last reviewed on  12/9/2022 8:28 AM by Sachi Anderson      Tasks added this encounter   1/14/2023 - Refill Call (Auto Added)   Tasks due within next 3 months   3/14/2023 - Clinical - Follow Up Assesement (Annual)     [facility] - [facility]  [address] [place] [id number]  Phone: [phone]  Fax: [phone]

## 2022-12-24 ENCOUNTER — PATIENT MESSAGE (OUTPATIENT)
Dept: RHEUMATOLOGY | Facility: CLINIC | Age: 52
End: 2022-12-24
Payer: COMMERCIAL

## 2022-12-28 DIAGNOSIS — M05.79 RHEUMATOID ARTHRITIS INVOLVING MULTIPLE SITES WITH POSITIVE RHEUMATOID FACTOR: Chronic | ICD-10-CM

## 2022-12-28 RX ORDER — PREDNISONE 5 MG/1
TABLET ORAL
Qty: 30 TABLET | Refills: 1 | Status: SHIPPED | OUTPATIENT
Start: 2022-12-28 | End: 2023-04-04

## 2022-12-29 ENCOUNTER — PATIENT MESSAGE (OUTPATIENT)
Dept: RHEUMATOLOGY | Facility: CLINIC | Age: 52
End: 2022-12-29
Payer: COMMERCIAL

## 2022-12-30 ENCOUNTER — PATIENT MESSAGE (OUTPATIENT)
Dept: RHEUMATOLOGY | Facility: CLINIC | Age: 52
End: 2022-12-30
Payer: COMMERCIAL

## 2023-01-17 ENCOUNTER — SPECIALTY PHARMACY (OUTPATIENT)
Dept: PHARMACY | Facility: CLINIC | Age: 53
End: 2023-01-17
Payer: COMMERCIAL

## 2023-01-17 NOTE — TELEPHONE ENCOUNTER
Specialty Pharmacy - Refill Coordination    Specialty Medication Orders Linked to Encounter      Flowsheet Row Most Recent Value   Medication #1 RINVOQ 15 mg 24 hr tablet (Order#[id number], Rx#[id number])            Refill Questions - Documented Responses      Flowsheet Row Most Recent Value   Patient Availability and HIPAA Verification    Does patient want to proceed with activity? Yes   HIPAA/medical authority confirmed? Yes   Relationship to patient of person spoken to? Self   Refill Screening Questions    Changes to allergies? No   Changes to medications? No   New conditions since last clinic visit? No   Unplanned office visit, urgent care, ED, or hospital admission in the last 4 weeks? No   How does patient/caregiver feel medication is working? Good   Financial problems or insurance changes? No   How many doses of your specialty medications were missed in the last 4 weeks? 0   Would patient like to speak to a pharmacist? No   When does the patient need to receive the medication? 01/24/23   Refill Delivery Questions    How will the patient receive the medication? MEDRx   When does the patient need to receive the medication? 01/24/23   Shipping Address Home   Address in [facility] confirmed and updated if neccessary? Yes   Expected Copay ($) 5498.44   Is the patient able to afford the medication copay? Yes   Payment Method  CC on file   Days supply of Refill 30   Supplies needed? No supplies needed   Refill activity completed? Yes   Refill activity plan Refill scheduled   Shipment/Pickup Date: 01/23/23            Current Outpatient Medications   Medication Sig    ascorbic acid, vitamin C, (VITAMIN C) 1000 MG tablet Take 1,000 mg by mouth.    COLACE 100 mg capsule Take 100 mg by mouth 2 (two) times daily.    econazole nitrate 1 % cream APPLY TOPICALLY TO THE AFFECTED AREA TWICE DAILY FOR 14 DAYS AS NEEDED FOR RINGWORM    fish oil-omega-3 fatty acids 300-1,000 mg capsule Take 2 g by mouth.     fluconazole (DIFLUCAN) 150 MG Tab Take 1 tablet now and repeat in 4 days.    hydrocortisone 2.5 % cream APPLY TO THE IRRITATED BUMPS ON CHEST TWICE DAILY AS NEEDED FOR UP TO 2 WEEKS.    hydrOXYchloroQUINE (PLAQUENIL) 200 mg tablet TAKE 1 TABLET(200 MG) BY MOUTH TWICE DAILY    multivitamin (THERAGRAN) per tablet Take 1 tablet by mouth.    oxyCODONE-acetaminophen (PERCOCET) 5-325 mg per tablet Take 1 tablet by mouth every 6 (six) hours as needed.    PENNSAID 20 mg/gram /actuation(2 %) sopm Apply 1 pump to skin twice a day as needed    predniSONE (DELTASONE) 5 MG tablet May take 5-10mg PRN RA flares    RINVOQ 15 mg 24 hr tablet Take 1 tablet (15 mg total) by mouth once daily.    tretinoin (RETIN-A) 0.025 % cream     triamcinolone acetonide 0.1% (KENALOG) 0.1 % cream 2 (two) times daily. Apply to affected area    XIIDRA 5 % Dpet once daily.    Last reviewed on 11/15/2022 11:13 AM by Sachi Anderson MA    Review of patient's allergies indicates:   Allergen Reactions    Tetracycline     Last reviewed on  12/28/2022 9:43 AM by Naga Bustamante      Tasks added this encounter   2/16/2023 - Refill Call (Auto Added)   Tasks due within next 3 months   3/14/2023 - Clinical - Follow Up Assesement (Annual)     [facility] - [facility]ty Pharmacy  [address] [place] [address]  Phone: [phone]  Fax: [phone]

## 2023-02-16 ENCOUNTER — SPECIALTY PHARMACY (OUTPATIENT)
Dept: PHARMACY | Facility: CLINIC | Age: 53
End: 2023-02-16
Payer: COMMERCIAL

## 2023-02-16 NOTE — TELEPHONE ENCOUNTER
Specialty Pharmacy - Refill Coordination    Specialty Medication Orders Linked to Encounter      Flowsheet Row Most Recent Value   Medication #1 RINVOQ 15 mg 24 hr tablet (Order#[id number], Rx#[id number])            Refill Questions - Documented Responses      Flowsheet Row Most Recent Value   Patient Availability and HIPAA Verification    Does patient want to proceed with activity? Yes   HIPAA/medical authority confirmed? Yes   Relationship to patient of person spoken to? Self   Refill Screening Questions    Changes to allergies? No   Changes to medications? No   New conditions since last clinic visit? No   Unplanned office visit, urgent care, ED, or hospital admission in the last 4 weeks? No   How does patient/caregiver feel medication is working? Good   Financial problems or insurance changes? No   How many doses of your specialty medications were missed in the last 4 weeks? 0   Would patient like to speak to a pharmacist? No   When does the patient need to receive the medication? 02/21/23   Refill Delivery Questions    How will the patient receive the medication? MEDRx   When does the patient need to receive the medication? 02/21/23   Shipping Address Home   Address in [facility] confirmed and updated if neccessary? Yes   Expected Copay ($) 5   Is the patient able to afford the medication copay? Yes   Payment Method CC on file   Days supply of Refill 30   Supplies needed? No supplies needed   Refill activity completed? Yes   Refill activity plan Refill scheduled   Shipment/Pickup Date: 02/17/23            Current Outpatient Medications   Medication Sig    ascorbic acid, vitamin C, (VITAMIN C) 1000 MG tablet Take 1,000 mg by mouth.    COLACE 100 mg capsule Take 100 mg by mouth 2 (two) times daily.    econazole nitrate 1 % cream APPLY TOPICALLY TO THE AFFECTED AREA TWICE DAILY FOR 14 DAYS AS NEEDED FOR RINGWORM    fish oil-omega-3 fatty acids 300-1,000 mg capsule Take 2 g by mouth.    fluconazole (DIFLUCAN)  150 MG Tab Take 1 tablet now and repeat in 4 days.    hydrocortisone 2.5 % cream APPLY TO THE IRRITATED BUMPS ON CHEST TWICE DAILY AS NEEDED FOR UP TO 2 WEEKS.    hydrOXYchloroQUINE (PLAQUENIL) 200 mg tablet TAKE 1 TABLET(200 MG) BY MOUTH TWICE DAILY    multivitamin (THERAGRAN) per tablet Take 1 tablet by mouth.    oxyCODONE-acetaminophen (PERCOCET) 5-325 mg per tablet Take 1 tablet by mouth every 6 (six) hours as needed.    PENNSAID 20 mg/gram /actuation(2 %) sopm Apply 1 pump to skin twice a day as needed    predniSONE (DELTASONE) 5 MG tablet May take 5-10mg PRN RA flares    RINVOQ 15 mg 24 hr tablet Take 1 tablet (15 mg total) by mouth once daily.    tretinoin (RETIN-A) 0.025 % cream     triamcinolone acetonide 0.1% (KENALOG) 0.1 % cream 2 (two) times daily. Apply to affected area    XIIDRA 5 % Dpet once daily.    Last reviewed on 11/15/2022 11:13 AM by Sachi Anderson MA    Review of patient's allergies indicates:   Allergen Reactions    Tetracycline     Last reviewed on  12/28/2022 9:43 AM by Naga Bustamante      Tasks added this encounter   3/16/2023 - Refill Call (Auto Added)   Tasks due within next 3 months   3/14/2023 - Clinical - Follow Up Assesement (Annual)     [facility] - [facility] Pharm[facility]  [address] [place], [place] [address]  [facility] [address]  Phone: [phone]  Fax: [phone]

## 2023-03-16 ENCOUNTER — SPECIALTY PHARMACY (OUTPATIENT)
Dept: PHARMACY | Facility: CLINIC | Age: 53
End: 2023-03-16
Payer: COMMERCIAL

## 2023-03-16 DIAGNOSIS — M05.772 RHEUMATOID ARTHRITIS INVOLVING LEFT FOOT WITH POSITIVE RHEUMATOID FACTOR: Primary | ICD-10-CM

## 2023-03-16 NOTE — TELEPHONE ENCOUNTER
Specialty Pharmacy - Clinical Reassessment  Specialty Pharmacy - Refill Coordination    Specialty Medication Orders Linked to Encounter      Flowsheet Row Most Recent Value   Medication #1 RINVOQ 15 mg 24 hr tablet (Order#[id number], Rx#[id number])          Patient Diagnosis   M05.79 - Rheumatoid arthritis involving multiple sites with positive rheumatoid factor    Elizabeth Johns is a 52 y.o. female, who is followed by the specialty pharmacy service for management and education of her RA.  She has been on therapy with Rinvoq since 7/11/21.  I have reviewed her electronic medical record and current medication list and determined that specialty medication adjustment Is needed at this time.    Patient has not experienced adverse events.  She Is adherent reporting 0 missed doses since last review.  Adherence has been encouraged with the following mechanism(s): Proactive refill calls.  She is meeting goals of therapy and will continue treatment.        2/16/2023 1/17/2023 12/16/2022 11/17/2022 10/18/2022 9/12/2022 8/15/2022   Follow Up Review   # of missed doses 0 0 0 0 0 0 0   New Medications? No No No No No No No   New Conditions? No No No No No No No   New Allergies? No No No No No No No   Med Effective? Good Good Good Good Very good Very good Excellent   Urgent Care? No No No No No No No   Requested Pharmacist? No No No No No No No            Therapy is appropriate to continue.    Therapy is effective: Yes  On scale of 1 to 10, how does patient rank quality of life? (10 - Best): 8  Recommendations: none at this time.  Review Method: Patient Contact    Tasks added this encounter   12/16/2023 - Clinical - Follow Up Assesement (Annual)  4/13/2023 - Refill Call (Auto Added)   Tasks due within next 3 months   No tasks due.     Niurka Murry, PharmD  [facility]  [address] [facility] [address]  Phone: [phone]  Fax: [phone]

## 2023-03-16 NOTE — TELEPHONE ENCOUNTER
Specialty Pharmacy - Clinical Reassessment  Specialty Pharmacy - Refill Coordination    Specialty Medication Orders Linked to Encounter      Flowsheet Row Most Recent Value   Medication #1 RINVOQ 15 mg 24 hr tablet (Order#[id number], Rx#[id number])            Refill Questions - Documented Responses      Flowsheet Row Most Recent Value   Patient Availability and HIPAA Verification    Does patient want to proceed with activity? Yes   HIPAA/medical authority confirmed? Yes   Relationship to patient of person spoken to? Self   Refill Screening Questions    Changes to allergies? No   Changes to medications? No   New conditions since last clinic visit? No   Unplanned office visit, urgent care, ED, or hospital admission in the last 4 weeks? No   How does patient/caregiver feel medication is working? Good   Financial problems or insurance changes? No   How many doses of your specialty medications were missed in the last 4 weeks? 0   Would patient like to speak to a pharmacist? No   When does the patient need to receive the medication? 03/21/23   Refill Delivery Questions    How will the patient receive the medication? MEDRx   When does the patient need to receive the medication? 03/21/23   Shipping Address Home   Address in [facility] confirmed and updated if neccessary? Yes   Expected Copay ($) 5   Is the patient able to afford the medication copay? Yes   Payment Method CC on file   Days supply of Refill 30   Supplies needed? No supplies needed   Refill activity completed? Yes   Refill activity plan Refill scheduled   Shipment/Pickup Date: 03/20/23            Current Outpatient Medications   Medication Sig    ascorbic acid, vitamin C, (VITAMIN C) 1000 MG tablet Take 1,000 mg by mouth.    COLACE 100 mg capsule Take 100 mg by mouth 2 (two) times daily.    econazole nitrate 1 % cream APPLY TOPICALLY TO THE AFFECTED AREA TWICE DAILY FOR 14 DAYS AS NEEDED FOR RINGWORM    fish oil-omega-3 fatty acids 300-1,000 mg capsule  Take 2 g by mouth.    fluconazole (DIFLUCAN) 150 MG Tab Take 1 tablet now and repeat in 4 days.    hydrocortisone 2.5 % cream APPLY TO THE IRRITATED BUMPS ON CHEST TWICE DAILY AS NEEDED FOR UP TO 2 WEEKS.    hydrOXYchloroQUINE (PLAQUENIL) 200 mg tablet TAKE 1 TABLET(200 MG) BY MOUTH TWICE DAILY    multivitamin (THERAGRAN) per tablet Take 1 tablet by mouth.    oxyCODONE-acetaminophen (PERCOCET) 5-325 mg per tablet Take 1 tablet by mouth every 6 (six) hours as needed.    PENNSAID 20 mg/gram /actuation(2 %) sopm Apply 1 pump to skin twice a day as needed    predniSONE (DELTASONE) 5 MG tablet May take 5-10mg PRN RA flares    RINVOQ 15 mg 24 hr tablet Take 1 tablet (15 mg total) by mouth once daily.    tretinoin (RETIN-A) 0.025 % cream     triamcinolone acetonide 0.1% (KENALOG) 0.1 % cream 2 (two) times daily. Apply to affected area    XIIDRA 5 % Dpet once daily.    Last reviewed on 3/16/2023 10:42 AM by Niurka Murry PharmD    Review of patient's allergies indicates:   Allergen Reactions    Tetracycline     Last reviewed on  3/16/2023 10:40 AM by Niurka Murry      Tasks added this encounter   12/16/2023 - Clinical - Follow Up Assesement (Annual)  4/13/2023 - Refill Call (Auto Added)   Tasks due within next 3 months   No tasks due.     Niurka Murry, PharmD  [facility]  [address] [place] [address]  Phone: [phone]  Fax: [phone]

## 2023-03-29 ENCOUNTER — LAB VISIT (OUTPATIENT)
Dept: LAB | Facility: HOSPITAL | Age: 53
End: 2023-03-29
Attending: INTERNAL MEDICINE
Payer: COMMERCIAL

## 2023-03-29 DIAGNOSIS — Z79.899 LONG TERM CURRENT USE OF IMMUNOSUPPRESSIVE DRUG: ICD-10-CM

## 2023-03-29 LAB
ALBUMIN SERPL BCP-MCNC: 4.2 G/DL (ref 3.5–5.2)
ALP SERPL-CCNC: 60 U/L (ref 55–135)
ALT SERPL W/O P-5'-P-CCNC: 29 U/L (ref 10–44)
ANION GAP SERPL CALC-SCNC: 9 MMOL/L (ref 8–16)
AST SERPL-CCNC: 31 U/L (ref 10–40)
BASOPHILS # BLD AUTO: 0.04 K/UL (ref 0–0.2)
BASOPHILS NFR BLD: 1.2 % (ref 0–1.9)
BILIRUB SERPL-MCNC: 0.3 MG/DL (ref 0.1–1)
BUN SERPL-MCNC: 22 MG/DL (ref 6–20)
CALCIUM SERPL-MCNC: 9.5 MG/DL (ref 8.7–10.5)
CHLORIDE SERPL-SCNC: 107 MMOL/L (ref 95–110)
CO2 SERPL-SCNC: 25 MMOL/L (ref 23–29)
CREAT SERPL-MCNC: 1.1 MG/DL (ref 0.5–1.4)
CRP SERPL-MCNC: 2.5 MG/L (ref 0–8.2)
DIFFERENTIAL METHOD: ABNORMAL
EOSINOPHIL # BLD AUTO: 0.1 K/UL (ref 0–0.5)
EOSINOPHIL NFR BLD: 2 % (ref 0–8)
ERYTHROCYTE [DISTWIDTH] IN BLOOD BY AUTOMATED COUNT: 13.6 % (ref 11.5–14.5)
ERYTHROCYTE [SEDIMENTATION RATE] IN BLOOD BY PHOTOMETRIC METHOD: <2 MM/HR (ref 0–36)
EST. GFR  (NO RACE VARIABLE): 59 ML/MIN/1.73 M^2
GLUCOSE SERPL-MCNC: 84 MG/DL (ref 70–110)
HCT VFR BLD AUTO: 41 % (ref 37–48.5)
HGB BLD-MCNC: 13.5 G/DL (ref 12–16)
IMM GRANULOCYTES # BLD AUTO: 0.01 K/UL (ref 0–0.04)
IMM GRANULOCYTES NFR BLD AUTO: 0.3 % (ref 0–0.5)
LYMPHOCYTES # BLD AUTO: 1.5 K/UL (ref 1–4.8)
LYMPHOCYTES NFR BLD: 44.1 % (ref 18–48)
MCH RBC QN AUTO: 31.6 PG (ref 27–31)
MCHC RBC AUTO-ENTMCNC: 32.9 G/DL (ref 32–36)
MCV RBC AUTO: 96 FL (ref 82–98)
MONOCYTES # BLD AUTO: 0.4 K/UL (ref 0.3–1)
MONOCYTES NFR BLD: 12.1 % (ref 4–15)
NEUTROPHILS # BLD AUTO: 1.4 K/UL (ref 1.8–7.7)
NEUTROPHILS NFR BLD: 40.3 % (ref 38–73)
NRBC BLD-RTO: 0 /100 WBC
PLATELET # BLD AUTO: 249 K/UL (ref 150–450)
PMV BLD AUTO: 9.6 FL (ref 9.2–12.9)
POTASSIUM SERPL-SCNC: 4.7 MMOL/L (ref 3.5–5.1)
PROT SERPL-MCNC: 6.9 G/DL (ref 6–8.4)
RBC # BLD AUTO: 4.27 M/UL (ref 4–5.4)
SODIUM SERPL-SCNC: 141 MMOL/L (ref 136–145)
WBC # BLD AUTO: 3.47 K/UL (ref 3.9–12.7)

## 2023-03-29 PROCEDURE — 85025 COMPLETE CBC W/AUTO DIFF WBC: CPT | Performed by: INTERNAL MEDICINE

## 2023-03-29 PROCEDURE — 85652 RBC SED RATE AUTOMATED: CPT | Performed by: INTERNAL MEDICINE

## 2023-03-29 PROCEDURE — 80053 COMPREHEN METABOLIC PANEL: CPT | Performed by: INTERNAL MEDICINE

## 2023-03-29 PROCEDURE — 86140 C-REACTIVE PROTEIN: CPT | Performed by: INTERNAL MEDICINE

## 2023-04-04 ENCOUNTER — OFFICE VISIT (OUTPATIENT)
Dept: RHEUMATOLOGY | Facility: CLINIC | Age: 53
End: 2023-04-04
Payer: COMMERCIAL

## 2023-04-04 VITALS
SYSTOLIC BLOOD PRESSURE: 130 MMHG | DIASTOLIC BLOOD PRESSURE: 83 MMHG | HEART RATE: 63 BPM | HEIGHT: 60 IN | WEIGHT: 160.5 LBS | BODY MASS INDEX: 31.51 KG/M2

## 2023-04-04 DIAGNOSIS — M05.79 RHEUMATOID ARTHRITIS INVOLVING MULTIPLE SITES WITH POSITIVE RHEUMATOID FACTOR: ICD-10-CM

## 2023-04-04 DIAGNOSIS — D84.9 IMMUNOCOMPROMISED: ICD-10-CM

## 2023-04-04 DIAGNOSIS — Z79.52 LONG TERM SYSTEMIC STEROID USER: ICD-10-CM

## 2023-04-04 DIAGNOSIS — M81.0 AGE-RELATED OSTEOPOROSIS WITHOUT CURRENT PATHOLOGICAL FRACTURE: Primary | ICD-10-CM

## 2023-04-04 DIAGNOSIS — Z51.81 MEDICATION MONITORING ENCOUNTER: ICD-10-CM

## 2023-04-04 PROCEDURE — 3079F PR MOST RECENT DIASTOLIC BLOOD PRESSURE 80-89 MM HG: ICD-10-PCS | Mod: CPTII,S$GLB,, | Performed by: INTERNAL MEDICINE

## 2023-04-04 PROCEDURE — 3008F BODY MASS INDEX DOCD: CPT | Mod: CPTII,S$GLB,, | Performed by: INTERNAL MEDICINE

## 2023-04-04 PROCEDURE — 99215 PR OFFICE/OUTPT VISIT, EST, LEVL V, 40-54 MIN: ICD-10-PCS | Mod: S$GLB,,, | Performed by: INTERNAL MEDICINE

## 2023-04-04 PROCEDURE — 99999 PR PBB SHADOW E&M-EST. PATIENT-LVL IV: ICD-10-PCS | Mod: PBBFAC,,, | Performed by: INTERNAL MEDICINE

## 2023-04-04 PROCEDURE — 3008F PR BODY MASS INDEX (BMI) DOCUMENTED: ICD-10-PCS | Mod: CPTII,S$GLB,, | Performed by: INTERNAL MEDICINE

## 2023-04-04 PROCEDURE — 1159F MED LIST DOCD IN RCRD: CPT | Mod: CPTII,S$GLB,, | Performed by: INTERNAL MEDICINE

## 2023-04-04 PROCEDURE — 1159F PR MEDICATION LIST DOCUMENTED IN MEDICAL RECORD: ICD-10-PCS | Mod: CPTII,S$GLB,, | Performed by: INTERNAL MEDICINE

## 2023-04-04 PROCEDURE — 3075F SYST BP GE 130 - 139MM HG: CPT | Mod: CPTII,S$GLB,, | Performed by: INTERNAL MEDICINE

## 2023-04-04 PROCEDURE — 99999 PR PBB SHADOW E&M-EST. PATIENT-LVL IV: CPT | Mod: PBBFAC,,, | Performed by: INTERNAL MEDICINE

## 2023-04-04 PROCEDURE — 1160F RVW MEDS BY RX/DR IN RCRD: CPT | Mod: CPTII,S$GLB,, | Performed by: INTERNAL MEDICINE

## 2023-04-04 PROCEDURE — 1160F PR REVIEW ALL MEDS BY PRESCRIBER/CLIN PHARMACIST DOCUMENTED: ICD-10-PCS | Mod: CPTII,S$GLB,, | Performed by: INTERNAL MEDICINE

## 2023-04-04 PROCEDURE — 99215 OFFICE O/P EST HI 40 MIN: CPT | Mod: S$GLB,,, | Performed by: INTERNAL MEDICINE

## 2023-04-04 PROCEDURE — 3075F PR MOST RECENT SYSTOLIC BLOOD PRESS GE 130-139MM HG: ICD-10-PCS | Mod: CPTII,S$GLB,, | Performed by: INTERNAL MEDICINE

## 2023-04-04 PROCEDURE — 3079F DIAST BP 80-89 MM HG: CPT | Mod: CPTII,S$GLB,, | Performed by: INTERNAL MEDICINE

## 2023-04-04 RX ORDER — HYDROCODONE BITARTRATE AND ACETAMINOPHEN 7.5; 325 MG/1; MG/1
1 TABLET ORAL EVERY 12 HOURS PRN
Qty: 14 TABLET | Refills: 0 | Status: SHIPPED | OUTPATIENT
Start: 2023-04-04 | End: 2023-04-11

## 2023-04-04 NOTE — PROGRESS NOTES
RHEUMATOLOGY CLINIC FOLLOW UP VISIT  Chief complaints, HPI, ROS, EXAM, Assessment & Plans:-  Elizabeth Paulino a 53 y.o. pleasant female comes in for follow-up visit.  Seropositive rheumatoid arthritis doing well on Rinvoq and Plaquenil here for follow-up visit.  Fragility fracture left wrist in 2022 managed surgically.  Healed well..  Not taking prednisone.  Not on any anti resorptive therapy for recently diagnosed osteoporosis because of concerns about medications.  Rheumatological review of system negative otherwise.  Physical examination shows no synovitis.  1. Age-related osteoporosis without current pathological fracture    2. Rheumatoid arthritis involving multiple sites with positive rheumatoid factor    3. Immunocompromised    4. Medication monitoring encounter      Problem List Items Addressed This Visit       Rheumatoid arthritis involving multiple sites with positive rheumatoid factor (Chronic)    Immunocompromised    Age-related osteoporosis without current pathological fracture - Primary     Other Visit Diagnoses       Medication monitoring encounter                Labs reviewed today:-   Latest Reference Range & Units 03/29/23 13:40   WBC 3.90 - 12.70 K/uL 3.47 (L)   RBC 4.00 - 5.40 M/uL 4.27   Hemoglobin 12.0 - 16.0 g/dL 13.5   Hematocrit 37.0 - 48.5 % 41.0   MCV 82 - 98 fL 96   MCH 27.0 - 31.0 pg 31.6 (H)   MCHC 32.0 - 36.0 g/dL 32.9   RDW 11.5 - 14.5 % 13.6   Platelets 150 - 450 K/uL 249   MPV 9.2 - 12.9 fL 9.6   Gran % 38.0 - 73.0 % 40.3   Lymph % 18.0 - 48.0 % 44.1   Mono % 4.0 - 15.0 % 12.1   Eosinophil % 0.0 - 8.0 % 2.0   Basophil % 0.0 - 1.9 % 1.2   Immature Granulocytes 0.0 - 0.5 % 0.3   Gran # (ANC) 1.8 - 7.7 K/uL 1.4 (L)   Lymph # 1.0 - 4.8 K/uL 1.5   Mono # 0.3 - 1.0 K/uL 0.4   Eos # 0.0 - 0.5 K/uL 0.1   Baso # 0.00 - 0.20 K/uL 0.04   Immature Grans (Abs) 0.00 - 0.04 K/uL 0.01   nRBC 0 /100 WBC 0   Differential Method  Automated   Sed  Rate 0 - 36 mm/Hr <2   Sodium 136 - 145 mmol/L 141   Potassium 3.5 - 5.1 mmol/L 4.7   Chloride 95 - 110 mmol/L 107   CO2 23 - 29 mmol/L 25   Anion Gap 8 - 16 mmol/L 9   BUN 6 - 20 mg/dL 22 (H)   Creatinine 0.5 - 1.4 mg/dL 1.1   eGFR >60 mL/min/1.73 m^2 59 !   Glucose 70 - 110 mg/dL 84   Calcium 8.7 - 10.5 mg/dL 9.5   Alkaline Phosphatase 55 - 135 U/L 60   PROTEIN TOTAL 6.0 - 8.4 g/dL 6.9   Albumin 3.5 - 5.2 g/dL 4.2   BILIRUBIN TOTAL 0.1 - 1.0 mg/dL 0.3   AST 10 - 40 U/L 31   ALT 10 - 44 U/L 29   CRP 0.0 - 8.2 mg/L 2.5   (L): Data is abnormally low  (H): Data is abnormally high  !: Data is abnormal      Severe osteoporosis with fragility fracture and T-score of L1 at -3.4.  Discussed in detail about osteoporosis and its treatment options.  Plan of care would be Evenity for 12 months and Prolia thereafter.  Advised all precautions.  No plans for invasive dental procedure.  Start Evenity as soon as possible.  Medication literature given on discharge.  Well controlled rheumatoid on Plaquenil and Rinvoq.  Continue the same.  Consider reduction of Plaquenil dose in future.  I have explained all of the above in detail and the patient understands all of the current recommendation(s). I have answered all questions to the best of my ability and to their complete satisfaction.     Total time spent 40 minutes including time needed to review the records, the   patient evaluation, documentation, face-to-face discussion with the patient,   more than 50% of the time was spent on coordination of care and counseling.    Level V visit.  # Follow up in about 6 months (around 10/4/2023).      Disclaimer: This note was prepared using voice recognition system and is likely to have sound alike errors and is not proof read.  Please call me with any questions.

## 2023-04-11 ENCOUNTER — PATIENT MESSAGE (OUTPATIENT)
Dept: RHEUMATOLOGY | Facility: CLINIC | Age: 53
End: 2023-04-11
Payer: COMMERCIAL

## 2023-04-11 ENCOUNTER — TELEPHONE (OUTPATIENT)
Dept: INFUSION THERAPY | Facility: HOSPITAL | Age: 53
End: 2023-04-11
Payer: COMMERCIAL

## 2023-04-11 NOTE — TELEPHONE ENCOUNTER
Pt states she is not ready to have any medication for osteoporosis at this time. She will continue resistance exercises and diet and will f/u with Dr DURANT in October.  Evenity appt cancelled per her request.

## 2023-04-13 DIAGNOSIS — M05.79 RHEUMATOID ARTHRITIS INVOLVING MULTIPLE SITES WITH POSITIVE RHEUMATOID FACTOR: ICD-10-CM

## 2023-04-13 RX ORDER — UPADACITINIB 15 MG/1
15 TABLET, EXTENDED RELEASE ORAL DAILY
Qty: 30 TABLET | Refills: 6 | Status: SHIPPED | OUTPATIENT
Start: 2023-04-13 | End: 2023-10-12

## 2023-04-17 ENCOUNTER — SPECIALTY PHARMACY (OUTPATIENT)
Dept: PHARMACY | Facility: CLINIC | Age: 53
End: 2023-04-17
Payer: COMMERCIAL

## 2023-05-15 ENCOUNTER — SPECIALTY PHARMACY (OUTPATIENT)
Dept: PHARMACY | Facility: CLINIC | Age: 53
End: 2023-05-15
Payer: COMMERCIAL

## 2023-05-15 NOTE — TELEPHONE ENCOUNTER
Specialty Pharmacy - Refill Coordination    Specialty Medication Orders Linked to Encounter      Flowsheet Row Most Recent Value   Medication #1 RINVOQ 15 mg 24 hr tablet (Order#[id number], Rx#[id number])            Refill Questions - Documented Responses      Flowsheet Row Most Recent Value   Refill Screening Questions    Changes to allergies? No   Changes to medications? No   New conditions since last clinic visit? No   Unplanned office visit, urgent care, ED, or hospital admission in the last 4 weeks? No   How does patient/caregiver feel medication is working? Good   Financial problems or insurance changes? No   How many doses of your specialty medications were missed in the last 4 weeks? 0   Would patient like to speak to a pharmacist? No   When does the patient need to receive the medication? 05/22/23   Refill Delivery Questions    How will the patient receive the medication? MEDRx   When does the patient need to receive the medication? 05/22/23   Shipping Address Home   Address in [facility] confirmed and updated if neccessary? Yes   Expected Copay ($) 0   Is the patient able to afford the medication copay? Yes   Payment Method zero copay   Days supply of Refill 30   Supplies needed? No supplies needed   Refill activity completed? Yes   Refill activity plan Refill scheduled   Shipment/Pickup Date: 05/17/23            Current Outpatient Medications   Medication Sig    ascorbic acid, vitamin C, (VITAMIN C) 1000 MG tablet Take 1,000 mg by mouth.    fish oil-omega-3 fatty acids 300-1,000 mg capsule Take 2 g by mouth.    fluconazole (DIFLUCAN) 150 MG Tab Take 1 tablet now and repeat in 4 days.    hydrocortisone 2.5 % cream APPLY TO THE IRRITATED BUMPS ON CHEST TWICE DAILY AS NEEDED FOR UP TO 2 WEEKS.    hydrOXYchloroQUINE (PLAQUENIL) 200 mg tablet TAKE 1 TABLET(200 MG) BY MOUTH TWICE DAILY    multivitamin (THERAGRAN) per tablet Take 1 tablet by mouth.    PENNSAID 20 mg/gram /actuation(2 %) sopm Apply 1 pump  to skin twice a day as needed    RINVOQ 15 mg 24 hr tablet Take 1 tablet (15 mg total) by mouth once daily.    tretinoin (RETIN-A) 0.025 % cream     triamcinolone acetonide 0.1% (KENALOG) 0.1 % cream 2 (two) times daily. Apply to affected area    XIIDRA 5 % Dpet once daily.    Last reviewed on 4/4/2023 10:27 AM by Alejandro Boswell MD    Review of patient's allergies indicates:   Allergen Reactions    Tetracycline     Last reviewed on  4/4/2023 10:27 AM by Alejandro Boswell      Tasks added this encounter   No tasks added.   Tasks due within next 3 months   5/15/2023 - Refill Coordination Outreach (1 time occurrence)     Rehana Davison [facility] - Specialty Pharmacy  [address] [place] [id number]  Phone: [phone]  Fax: [phone]

## 2023-06-09 ENCOUNTER — SPECIALTY PHARMACY (OUTPATIENT)
Dept: PHARMACY | Facility: CLINIC | Age: 53
End: 2023-06-09
Payer: COMMERCIAL

## 2023-06-09 NOTE — TELEPHONE ENCOUNTER
Specialty Pharmacy - Refill Coordination    Specialty Medication Orders Linked to Encounter      Flowsheet Row Most Recent Value   Medication #1 RINVOQ 15 mg 24 hr tablet (Order#[id number], Rx#[id number])            Refill Questions - Documented Responses      Flowsheet Row Most Recent Value   Patient Availability and HIPAA Verification    Does patient want to proceed with activity? Yes   HIPAA/medical authority confirmed? Yes   Relationship to patient of person spoken to? Self   Refill Screening Questions    Changes to allergies? No   Changes to medications? No   New conditions since last clinic visit? No   Unplanned office visit, urgent care, ED, or hospital admission in the last 4 weeks? No   How does patient/caregiver feel medication is working? Good   Financial problems or insurance changes? No   How many doses of your specialty medications were missed in the last 4 weeks? 0   Would patient like to speak to a pharmacist? No   When does the patient need to receive the medication? 06/15/23   Refill Delivery Questions    How will the patient receive the medication? MEDRx   When does the patient need to receive the medication? 06/15/23   Shipping Address Home   Address in [facility] confirmed and updated if neccessary? Yes   Expected Copay ($) 0   Is the patient able to afford the medication copay? Yes   Payment Method zero copay   Days supply of Refill 30   Supplies needed? No supplies needed   Refill activity completed? Yes   Refill activity plan Refill scheduled   Shipment/Pickup Date: 06/13/23            Current Outpatient Medications   Medication Sig    ascorbic acid, vitamin C, (VITAMIN C) 1000 MG tablet Take 1,000 mg by mouth.    fish oil-omega-3 fatty acids 300-1,000 mg capsule Take 2 g by mouth.    fluconazole (DIFLUCAN) 150 MG Tab Take 1 tablet now and repeat in 4 days.    hydrocortisone 2.5 % cream APPLY TO THE IRRITATED BUMPS ON CHEST TWICE DAILY AS NEEDED FOR UP TO 2 WEEKS.    hydrOXYchloroQUINE  (PLAQUENIL) 200 mg tablet TAKE 1 TABLET(200 MG) BY MOUTH TWICE DAILY    multivitamin (THERAGRAN) per tablet Take 1 tablet by mouth.    PENNSAID 20 mg/gram /actuation(2 %) sopm Apply 1 pump to skin twice a day as needed    RINVOQ 15 mg 24 hr tablet Take 1 tablet (15 mg total) by mouth once daily.    tretinoin (RETIN-A) 0.025 % cream     triamcinolone acetonide 0.1% (KENALOG) 0.1 % cream 2 (two) times daily. Apply to affected area    XIIDRA 5 % Dpet once daily.    Last reviewed on 4/4/2023 10:27 AM by Alejandro Boswell MD    Review of patient's allergies indicates:   Allergen Reactions    Tetracycline     Last reviewed on  4/4/2023 10:27 AM by Alejandro Boswell      Tasks added this encounter   No tasks added.   Tasks due within next 3 months   No tasks due.     Araceli Davison [facility] - Specialty Pharmacy  [address] [place] [id number]  Phone: [phone]  Fax: [phone]

## 2023-06-27 ENCOUNTER — PATIENT MESSAGE (OUTPATIENT)
Dept: RHEUMATOLOGY | Facility: CLINIC | Age: 53
End: 2023-06-27
Payer: COMMERCIAL

## 2023-06-28 ENCOUNTER — PATIENT MESSAGE (OUTPATIENT)
Dept: RHEUMATOLOGY | Facility: CLINIC | Age: 53
End: 2023-06-28
Payer: COMMERCIAL

## 2023-06-28 RX ORDER — PREDNISONE 5 MG/1
5 TABLET ORAL DAILY PRN
Qty: 30 TABLET | Refills: 3 | Status: SHIPPED | OUTPATIENT
Start: 2023-06-28 | End: 2023-12-15

## 2023-07-06 ENCOUNTER — SPECIALTY PHARMACY (OUTPATIENT)
Dept: PHARMACY | Facility: CLINIC | Age: 53
End: 2023-07-06
Payer: COMMERCIAL

## 2023-07-06 NOTE — TELEPHONE ENCOUNTER
Specialty Pharmacy - Refill Coordination    Specialty Medication Orders Linked to Encounter      Flowsheet Row Most Recent Value   Medication #1 RINVOQ 15 mg 24 hr tablet (Order#[id number], Rx#[id number])            Refill Questions - Documented Responses      Flowsheet Row Most Recent Value   Patient Availability and HIPAA Verification    Does patient want to proceed with activity? Yes   HIPAA/medical authority confirmed? Yes   Relationship to patient of person spoken to? Self   Refill Screening Questions    Changes to allergies? No   Changes to medications? No   New conditions since last clinic visit? No   Unplanned office visit, urgent care, ED, or hospital admission in the last 4 weeks? No   How does patient/caregiver feel medication is working? Good   Financial problems or insurance changes? No   How many doses of your specialty medications were missed in the last 4 weeks? 0   Would patient like to speak to a pharmacist? No   When does the patient need to receive the medication? 07/12/23   Refill Delivery Questions    How will the patient receive the medication? MEDRx   When does the patient need to receive the medication? 07/12/23   Shipping Address Home   Address in [facility] confirmed and updated if neccessary? Yes   Expected Copay ($) 0   Is the patient able to afford the medication copay? Yes   Payment Method zero copay   Days supply of Refill 30   Supplies needed? No supplies needed   Refill activity completed? Yes   Refill activity plan Refill scheduled   Shipment/Pickup Date: 07/10/23            Current Outpatient Medications   Medication Sig    ascorbic acid, vitamin C, (VITAMIN C) 1000 MG tablet Take 1,000 mg by mouth.    fish oil-omega-3 fatty acids 300-1,000 mg capsule Take 2 g by mouth.    fluconazole (DIFLUCAN) 150 MG Tab Take 1 tablet now and repeat in 4 days.    hydrocortisone 2.5 % cream APPLY TO THE IRRITATED BUMPS ON CHEST TWICE DAILY AS NEEDED FOR UP TO 2 WEEKS.    hydrOXYchloroQUINE  (PLAQUENIL) 200 mg tablet TAKE 1 TABLET(200 MG) BY MOUTH TWICE DAILY    multivitamin (THERAGRAN) per tablet Take 1 tablet by mouth.    PENNSAID 20 mg/gram /actuation(2 %) sopm Apply 1 pump to skin twice a day as needed    predniSONE (DELTASONE) 5 MG tablet Take 1 tablet (5 mg total) by mouth daily as needed (RA flares).    RINVOQ 15 mg 24 hr tablet Take 1 tablet (15 mg total) by mouth once daily.    tretinoin (RETIN-A) 0.025 % cream     triamcinolone acetonide 0.1% (KENALOG) 0.1 % cream 2 (two) times daily. Apply to affected area    XIIDRA 5 % Dpet once daily.    Last reviewed on 4/4/2023 10:27 AM by Alejandro Boswell MD    Review of patient's allergies indicates:   Allergen Reactions    Tetracycline     Last reviewed on  6/28/2023 8:12 AM by Ragini Ellis      Tasks added this encounter   No tasks added.   Tasks due within next 3 months   No tasks due.     Araceli Davison [facility] - Specialty Pharmacy  [address] [place] [id number]  Phone: [phone]  Fax: [phone]

## 2023-08-02 ENCOUNTER — SPECIALTY PHARMACY (OUTPATIENT)
Dept: PHARMACY | Facility: CLINIC | Age: 53
End: 2023-08-02
Payer: COMMERCIAL

## 2023-08-02 NOTE — TELEPHONE ENCOUNTER
Patient says she has 5 tablets on hand. Informed that [facility] will reach out once approved. Routed to [facility].

## 2023-08-03 NOTE — TELEPHONE ENCOUNTER
Specialty Pharmacy - Refill Coordination  Specialty Pharmacy - Medication/Referral Authorization    Specialty Medication Orders Linked to Encounter      Flowsheet Row Most Recent Value   Medication #1 RINVOQ 15 mg 24 hr tablet (Order#[id number], Rx#[id number])            Refill Questions - Documented Responses      Flowsheet Row Most Recent Value   Patient Availability and HIPAA Verification    Does patient want to proceed with activity? Yes   HIPAA/medical authority confirmed? Yes   Relationship to patient of person spoken to? Self   Refill Screening Questions    Changes to allergies? No   Changes to medications? No   New conditions since last clinic visit? No   Unplanned office visit, urgent care, ED, or hospital admission in the last 4 weeks? No   How does patient/caregiver feel medication is working? Good   Financial problems or insurance changes? No   How many doses of your specialty medications were missed in the last 4 weeks? 0   Would patient like to speak to a pharmacist? No   When does the patient need to receive the medication? 08/08/23   Refill Delivery Questions    How will the patient receive the medication? MEDRx   When does the patient need to receive the medication? 08/08/23   Shipping Address Home   Address in [facility] confirmed and updated if neccessary? Yes   Expected Copay ($) 0   Is the patient able to afford the medication copay? Yes   Payment Method zero copay   Days supply of Refill 30   Supplies needed? No supplies needed   Refill activity completed? Yes   Refill activity plan Refill scheduled   Shipment/Pickup Date: 08/07/23            Current Outpatient Medications   Medication Sig    ascorbic acid, vitamin C, (VITAMIN C) 1000 MG tablet Take 1,000 mg by mouth.    fish oil-omega-3 fatty acids 300-1,000 mg capsule Take 2 g by mouth.    fluconazole (DIFLUCAN) 150 MG Tab Take 1 tablet now and repeat in 4 days.    hydrocortisone 2.5 % cream APPLY TO THE IRRITATED BUMPS ON CHEST TWICE  DAILY AS NEEDED FOR UP TO 2 WEEKS.    hydrOXYchloroQUINE (PLAQUENIL) 200 mg tablet TAKE 1 TABLET(200 MG) BY MOUTH TWICE DAILY    multivitamin (THERAGRAN) per tablet Take 1 tablet by mouth.    PENNSAID 20 mg/gram /actuation(2 %) sopm Apply 1 pump to skin twice a day as needed    predniSONE (DELTASONE) 5 MG tablet Take 1 tablet (5 mg total) by mouth daily as needed (RA flares).    RINVOQ 15 mg 24 hr tablet Take 1 tablet (15 mg total) by mouth once daily.    tretinoin (RETIN-A) 0.025 % cream     triamcinolone acetonide 0.1% (KENALOG) 0.1 % cream 2 (two) times daily. Apply to affected area    XIIDRA 5 % Dpet once daily.    Last reviewed on 4/4/2023 10:27 AM by Alejandro Boswell MD    Review of patient's allergies indicates:   Allergen Reactions    Tetracycline     Last reviewed on  6/28/2023 8:12 AM by Ragini Ellis      Tasks added this encounter   8/2/2024 - Benefits Review (Annual recurrence)   Tasks due within next 3 months   8/4/2023 - Refill Coordination Outreach (1 time occurrence)     Jud Davison [facility] - Specialty Pharmacy  [address] [facility] [address]  Phone: [phone]  Fax: [phone]

## 2023-08-12 ENCOUNTER — PATIENT MESSAGE (OUTPATIENT)
Dept: RHEUMATOLOGY | Facility: CLINIC | Age: 53
End: 2023-08-12
Payer: COMMERCIAL

## 2023-08-12 DIAGNOSIS — M06.9 FLARE OF RHEUMATOID ARTHRITIS: Primary | ICD-10-CM

## 2023-08-14 RX ORDER — METHYLPREDNISOLONE 4 MG/1
TABLET ORAL
Qty: 21 TABLET | Refills: 0 | Status: SHIPPED | OUTPATIENT
Start: 2023-08-14 | End: 2023-12-15

## 2023-10-06 ENCOUNTER — PATIENT MESSAGE (OUTPATIENT)
Dept: RHEUMATOLOGY | Facility: CLINIC | Age: 53
End: 2023-10-06
Payer: COMMERCIAL

## 2023-10-06 DIAGNOSIS — M05.79 RHEUMATOID ARTHRITIS INVOLVING MULTIPLE SITES WITH POSITIVE RHEUMATOID FACTOR: Primary | ICD-10-CM

## 2023-10-09 ENCOUNTER — LAB VISIT (OUTPATIENT)
Dept: LAB | Facility: HOSPITAL | Age: 53
End: 2023-10-09
Attending: FAMILY MEDICINE
Payer: COMMERCIAL

## 2023-10-09 DIAGNOSIS — Z51.81 MEDICATION MONITORING ENCOUNTER: ICD-10-CM

## 2023-10-09 DIAGNOSIS — M81.0 AGE-RELATED OSTEOPOROSIS WITHOUT CURRENT PATHOLOGICAL FRACTURE: ICD-10-CM

## 2023-10-09 DIAGNOSIS — D84.9 IMMUNOCOMPROMISED: ICD-10-CM

## 2023-10-09 DIAGNOSIS — M05.79 RHEUMATOID ARTHRITIS INVOLVING MULTIPLE SITES WITH POSITIVE RHEUMATOID FACTOR: ICD-10-CM

## 2023-10-09 LAB
ALBUMIN SERPL BCP-MCNC: 4 G/DL (ref 3.5–5.2)
ALP SERPL-CCNC: 62 U/L (ref 55–135)
ALT SERPL W/O P-5'-P-CCNC: 25 U/L (ref 10–44)
ANION GAP SERPL CALC-SCNC: 11 MMOL/L (ref 8–16)
AST SERPL-CCNC: 26 U/L (ref 10–40)
BASOPHILS # BLD AUTO: 0.03 K/UL (ref 0–0.2)
BASOPHILS NFR BLD: 0.7 % (ref 0–1.9)
BILIRUB SERPL-MCNC: 0.3 MG/DL (ref 0.1–1)
BUN SERPL-MCNC: 34 MG/DL (ref 6–20)
CALCIUM SERPL-MCNC: 9.5 MG/DL (ref 8.7–10.5)
CHLORIDE SERPL-SCNC: 108 MMOL/L (ref 95–110)
CO2 SERPL-SCNC: 24 MMOL/L (ref 23–29)
CREAT SERPL-MCNC: 0.9 MG/DL (ref 0.5–1.4)
CRP SERPL-MCNC: 0.1 MG/L (ref 0–8.2)
DIFFERENTIAL METHOD: ABNORMAL
EOSINOPHIL # BLD AUTO: 0.1 K/UL (ref 0–0.5)
EOSINOPHIL NFR BLD: 3.5 % (ref 0–8)
ERYTHROCYTE [DISTWIDTH] IN BLOOD BY AUTOMATED COUNT: 13.5 % (ref 11.5–14.5)
ERYTHROCYTE [SEDIMENTATION RATE] IN BLOOD BY PHOTOMETRIC METHOD: <2 MM/HR (ref 0–36)
EST. GFR  (NO RACE VARIABLE): >60 ML/MIN/1.73 M^2
GLUCOSE SERPL-MCNC: 92 MG/DL (ref 70–110)
HCT VFR BLD AUTO: 39 % (ref 37–48.5)
HGB BLD-MCNC: 13 G/DL (ref 12–16)
IMM GRANULOCYTES # BLD AUTO: 0.01 K/UL (ref 0–0.04)
IMM GRANULOCYTES NFR BLD AUTO: 0.2 % (ref 0–0.5)
LYMPHOCYTES # BLD AUTO: 1.8 K/UL (ref 1–4.8)
LYMPHOCYTES NFR BLD: 43.3 % (ref 18–48)
MCH RBC QN AUTO: 31.5 PG (ref 27–31)
MCHC RBC AUTO-ENTMCNC: 33.3 G/DL (ref 32–36)
MCV RBC AUTO: 94 FL (ref 82–98)
MONOCYTES # BLD AUTO: 0.5 K/UL (ref 0.3–1)
MONOCYTES NFR BLD: 11.9 % (ref 4–15)
NEUTROPHILS # BLD AUTO: 1.6 K/UL (ref 1.8–7.7)
NEUTROPHILS NFR BLD: 40.4 % (ref 38–73)
NRBC BLD-RTO: 0 /100 WBC
PLATELET # BLD AUTO: 277 K/UL (ref 150–450)
PMV BLD AUTO: 9 FL (ref 9.2–12.9)
POTASSIUM SERPL-SCNC: 5.4 MMOL/L (ref 3.5–5.1)
PROT SERPL-MCNC: 6.5 G/DL (ref 6–8.4)
RBC # BLD AUTO: 4.13 M/UL (ref 4–5.4)
SODIUM SERPL-SCNC: 143 MMOL/L (ref 136–145)
WBC # BLD AUTO: 4.04 K/UL (ref 3.9–12.7)

## 2023-10-09 PROCEDURE — 85652 RBC SED RATE AUTOMATED: CPT | Performed by: INTERNAL MEDICINE

## 2023-10-09 PROCEDURE — 86200 CCP ANTIBODY: CPT | Performed by: INTERNAL MEDICINE

## 2023-10-09 PROCEDURE — 36415 COLL VENOUS BLD VENIPUNCTURE: CPT | Performed by: INTERNAL MEDICINE

## 2023-10-09 PROCEDURE — 86140 C-REACTIVE PROTEIN: CPT | Performed by: INTERNAL MEDICINE

## 2023-10-09 PROCEDURE — 85025 COMPLETE CBC W/AUTO DIFF WBC: CPT | Performed by: INTERNAL MEDICINE

## 2023-10-09 PROCEDURE — 80053 COMPREHEN METABOLIC PANEL: CPT | Performed by: INTERNAL MEDICINE

## 2023-10-10 LAB — CCP AB SER IA-ACNC: 103.9 U/ML

## 2023-10-11 NOTE — TELEPHONE ENCOUNTER
Heart Failure Outpatient Care Coordinator Note: Call made to patient and reviewed medication. She denies edema, abdominal fullness or CP. States trying to eat lower sodium as much as possible. Unsure how compliant she can be. States her weight yesterday is 237- 238# and denies weight gain since discharge. Still no stable housing and sleeps at uncles or a friends house. Plans to call the  [facility]) back today to discuss housing/shelter and SNAP benefits if eligible. Patient is aware of cardiology appointment today and has transportation. Also aware of lab work needed this week. We discussed how HF is chronic and progressive disease and that frequent exacerbation with hospitalizations impacts her heart and kidneys. Reinforced that she should reach out to this [facility] or the cardiology office early with any symptoms of volume overload or CP so she can be evaluated in outpatient setting when possible. She verbalizes understanding and willingness to call. Specialty Pharmacy - Refill Coordination    Specialty Medication Orders Linked to Encounter      Flowsheet Row Most Recent Value   Medication #1 RINVOQ 15 mg 24 hr tablet (Order#[id number], Rx#[id number])            Refill Questions - Documented Responses      Flowsheet Row Most Recent Value   Patient Availability and HIPAA Verification    Does patient want to proceed with activity? Yes   HIPAA/medical authority confirmed? Yes   Relationship to patient of person spoken to? Self   Refill Screening Questions    Changes to allergies? No   Changes to medications? No   New conditions since last clinic visit? No   Unplanned office visit, urgent care, ED, or hospital admission in the last 4 weeks? No   How does patient/caregiver feel medication is working? Good   Financial problems or insurance changes? No   How many doses of your specialty medications were missed in the last 4 weeks? 0   Would patient like to speak to a pharmacist? No   When does the patient need to receive the medication? 04/22/23   Refill Delivery Questions    How will the patient receive the medication? MEDRx   When does the patient need to receive the medication? 04/22/23   Shipping Address Home   Address in [facility] confirmed and updated if neccessary? Yes   Expected Copay ($) 0   Is the patient able to afford the medication copay? Yes   Payment Method zero copay   Days supply of Refill 30   Supplies needed? No supplies needed   Refill activity completed? Yes   Refill activity plan Refill scheduled   Shipment/Pickup Date: 04/18/23            Current Outpatient Medications   Medication Sig    ascorbic acid, vitamin C, (VITAMIN C) 1000 MG tablet Take 1,000 mg by mouth.    fish oil-omega-3 fatty acids 300-1,000 mg capsule Take 2 g by mouth.    fluconazole (DIFLUCAN) 150 MG Tab Take 1 tablet now and repeat in 4 days.    hydrocortisone 2.5 % cream APPLY TO THE IRRITATED BUMPS ON CHEST TWICE DAILY AS NEEDED FOR UP TO 2 WEEKS.    hydrOXYchloroQUINE  (PLAQUENIL) 200 mg tablet TAKE 1 TABLET(200 MG) BY MOUTH TWICE DAILY    multivitamin (THERAGRAN) per tablet Take 1 tablet by mouth.    PENNSAID 20 mg/gram /actuation(2 %) sopm Apply 1 pump to skin twice a day as needed    RINVOQ 15 mg 24 hr tablet Take 1 tablet (15 mg total) by mouth once daily.    tretinoin (RETIN-A) 0.025 % cream     triamcinolone acetonide 0.1% (KENALOG) 0.1 % cream 2 (two) times daily. Apply to affected area    XIIDRA 5 % Dpet once daily.    Last reviewed on 4/4/2023 10:27 AM by Alejandro Boswell MD    Review of patient's allergies indicates:   Allergen Reactions    Tetracycline     Last reviewed on  4/4/2023 10:27 AM by Alejandro Boswell      Tasks added this encounter   5/15/2023 - Refill Call (Auto Added)   Tasks due within next 3 months   No tasks due.     Alisha Davison [facility] - Specialty Pharmacy  [address] [facility] [address]  Phone: [phone]  Fax: [phone]

## 2023-10-12 ENCOUNTER — OFFICE VISIT (OUTPATIENT)
Dept: RHEUMATOLOGY | Facility: CLINIC | Age: 53
End: 2023-10-12
Payer: COMMERCIAL

## 2023-10-12 ENCOUNTER — HOSPITAL ENCOUNTER (OUTPATIENT)
Dept: RADIOLOGY | Facility: HOSPITAL | Age: 53
Discharge: HOME | End: 2023-10-12
Attending: INTERNAL MEDICINE
Payer: COMMERCIAL

## 2023-10-12 VITALS
DIASTOLIC BLOOD PRESSURE: 77 MMHG | WEIGHT: 156.75 LBS | HEIGHT: 60 IN | SYSTOLIC BLOOD PRESSURE: 125 MMHG | BODY MASS INDEX: 30.77 KG/M2 | HEART RATE: 75 BPM

## 2023-10-12 DIAGNOSIS — Z51.81 ENCOUNTER FOR MEDICATION MONITORING: ICD-10-CM

## 2023-10-12 DIAGNOSIS — M05.79 RHEUMATOID ARTHRITIS INVOLVING MULTIPLE SITES WITH POSITIVE RHEUMATOID FACTOR: Primary | ICD-10-CM

## 2023-10-12 DIAGNOSIS — D84.821 DRUG-INDUCED IMMUNODEFICIENCY: ICD-10-CM

## 2023-10-12 DIAGNOSIS — M05.79 RHEUMATOID ARTHRITIS INVOLVING MULTIPLE SITES WITH POSITIVE RHEUMATOID FACTOR: ICD-10-CM

## 2023-10-12 DIAGNOSIS — Z79.899 DRUG-INDUCED IMMUNODEFICIENCY: ICD-10-CM

## 2023-10-12 DIAGNOSIS — M81.0 AGE-RELATED OSTEOPOROSIS WITHOUT CURRENT PATHOLOGICAL FRACTURE: ICD-10-CM

## 2023-10-12 PROCEDURE — 3074F PR MOST RECENT SYSTOLIC BLOOD PRESSURE < 130 MM HG: ICD-10-PCS | Mod: CPTII,S$GLB,, | Performed by: INTERNAL MEDICINE

## 2023-10-12 PROCEDURE — 73630 X-RAY EXAM OF FOOT: CPT | Mod: TC,50

## 2023-10-12 PROCEDURE — 99214 OFFICE O/P EST MOD 30 MIN: CPT | Mod: S$GLB,,, | Performed by: INTERNAL MEDICINE

## 2023-10-12 PROCEDURE — 73130 X-RAY EXAM OF HAND: CPT | Mod: 26,,, | Performed by: RADIOLOGY

## 2023-10-12 PROCEDURE — 99214 PR OFFICE/OUTPT VISIT, EST, LEVL IV, 30-39 MIN: ICD-10-PCS | Mod: S$GLB,,, | Performed by: INTERNAL MEDICINE

## 2023-10-12 PROCEDURE — 73130 XR HAND COMPLETE 3 VIEWS BILATERAL: ICD-10-PCS | Mod: 26,,, | Performed by: RADIOLOGY

## 2023-10-12 PROCEDURE — 73630 X-RAY EXAM OF FOOT: CPT | Mod: 26,,, | Performed by: RADIOLOGY

## 2023-10-12 PROCEDURE — 1160F PR REVIEW ALL MEDS BY PRESCRIBER/CLIN PHARMACIST DOCUMENTED: ICD-10-PCS | Mod: CPTII,S$GLB,, | Performed by: INTERNAL MEDICINE

## 2023-10-12 PROCEDURE — 1159F MED LIST DOCD IN RCRD: CPT | Mod: CPTII,S$GLB,, | Performed by: INTERNAL MEDICINE

## 2023-10-12 PROCEDURE — 3078F DIAST BP <80 MM HG: CPT | Mod: CPTII,S$GLB,, | Performed by: INTERNAL MEDICINE

## 2023-10-12 PROCEDURE — 99999 PR PBB SHADOW E&M-EST. PATIENT-LVL III: CPT | Mod: PBBFAC,,, | Performed by: INTERNAL MEDICINE

## 2023-10-12 PROCEDURE — 1159F PR MEDICATION LIST DOCUMENTED IN MEDICAL RECORD: ICD-10-PCS | Mod: CPTII,S$GLB,, | Performed by: INTERNAL MEDICINE

## 2023-10-12 PROCEDURE — 1160F RVW MEDS BY RX/DR IN RCRD: CPT | Mod: CPTII,S$GLB,, | Performed by: INTERNAL MEDICINE

## 2023-10-12 PROCEDURE — 3078F PR MOST RECENT DIASTOLIC BLOOD PRESSURE < 80 MM HG: ICD-10-PCS | Mod: CPTII,S$GLB,, | Performed by: INTERNAL MEDICINE

## 2023-10-12 PROCEDURE — 73130 X-RAY EXAM OF HAND: CPT | Mod: TC,50

## 2023-10-12 PROCEDURE — 99999 PR PBB SHADOW E&M-EST. PATIENT-LVL III: ICD-10-PCS | Mod: PBBFAC,,, | Performed by: INTERNAL MEDICINE

## 2023-10-12 PROCEDURE — 73630 XR FOOT COMPLETE 3 VIEW BILATERAL: ICD-10-PCS | Mod: 26,,, | Performed by: RADIOLOGY

## 2023-10-12 PROCEDURE — 3074F SYST BP LT 130 MM HG: CPT | Mod: CPTII,S$GLB,, | Performed by: INTERNAL MEDICINE

## 2023-10-12 PROCEDURE — 3008F PR BODY MASS INDEX (BMI) DOCUMENTED: ICD-10-PCS | Mod: CPTII,S$GLB,, | Performed by: INTERNAL MEDICINE

## 2023-10-12 PROCEDURE — 3008F BODY MASS INDEX DOCD: CPT | Mod: CPTII,S$GLB,, | Performed by: INTERNAL MEDICINE

## 2023-10-12 RX ORDER — HYDROXYCHLOROQUINE SULFATE 200 MG/1
200 TABLET, FILM COATED ORAL 2 TIMES DAILY
Qty: 180 TABLET | Refills: 3 | Status: SHIPPED | OUTPATIENT
Start: 2023-10-12

## 2023-10-12 RX ORDER — UPADACITINIB 15 MG/1
15 TABLET, EXTENDED RELEASE ORAL DAILY
Qty: 30 TABLET | Refills: 11 | Status: ACTIVE | OUTPATIENT
Start: 2023-10-12

## 2023-10-12 NOTE — PROGRESS NOTES
RHEUMATOLOGY CLINIC FOLLOW UP VISIT  Chief complaints, HPI, ROS, EXAM, Assessment & Plans:-  Elizabeth Paulino a 53 y.o. pleasant female comes in for follow-up visit.  Seropositive rheumatoid arthritis doing well on Rinvoq and Plaquenil here for follow-up visit.  Fragility fracture left wrist in 2022 managed surgically.  Healed well..  Not taking prednisone.  Not on any anti resorptive therapy for recently diagnosed osteoporosis because of concerns about medications.  Rheumatological review of system negative otherwise.  Physical examination shows no synovitis.  1. Rheumatoid arthritis involving multiple sites with positive rheumatoid factor    2. Age-related osteoporosis without current pathological fracture    3. Drug-induced immunodeficiency    4. Encounter for medication monitoring      Problem List Items Addressed This Visit       Rheumatoid arthritis involving multiple sites with positive rheumatoid factor - Primary (Chronic)    Relevant Medications    hydroxychloroquine (PLAQUENIL) 200 mg tablet    RINVOQ 15 mg 24 hr tablet    Other Relevant Orders    X-Ray Hand 3 View Bilateral    X-Ray Foot Complete Bilateral    CBC Auto Differential    Comprehensive Metabolic Panel    C-Reactive Protein    Sedimentation rate    Age-related osteoporosis without current pathological fracture    Relevant Orders    DXA Bone Density Axial Skeleton 1 or more sites     Other Visit Diagnoses       Drug-induced immunodeficiency        Relevant Orders    CBC Auto Differential    Comprehensive Metabolic Panel    C-Reactive Protein    Sedimentation rate    Encounter for medication monitoring        Relevant Orders    CBC Auto Differential    Comprehensive Metabolic Panel    C-Reactive Protein    Sedimentation rate            Labs reviewed today:-   Latest Reference Range & Units 10/09/23 15:54   WBC 3.90 - 12.70 K/uL 4.04   RBC 4.00 - 5.40 M/uL 4.13   Hemoglobin 12.0 - 16.0 g/dL  13.0   Hematocrit 37.0 - 48.5 % 39.0   MCV 82 - 98 fL 94   MCH 27.0 - 31.0 pg 31.5 (H)   MCHC 32.0 - 36.0 g/dL 33.3   RDW 11.5 - 14.5 % 13.5   Platelet Count 150 - 450 K/uL 277   MPV 9.2 - 12.9 fL 9.0 (L)   Gran % 38.0 - 73.0 % 40.4   Lymph % 18.0 - 48.0 % 43.3   Mono % 4.0 - 15.0 % 11.9   Eosinophil % 0.0 - 8.0 % 3.5   Basophil % 0.0 - 1.9 % 0.7   Immature Granulocytes 0.0 - 0.5 % 0.2   Gran # (ANC) 1.8 - 7.7 K/uL 1.6 (L)   Lymph # 1.0 - 4.8 K/uL 1.8   Mono # 0.3 - 1.0 K/uL 0.5   Eos # 0.0 - 0.5 K/uL 0.1   Baso # 0.00 - 0.20 K/uL 0.03   Immature Grans (Abs) 0.00 - 0.04 K/uL 0.01   nRBC 0 /100 WBC 0   Differential Method  Automated   Sed Rate 0 - 36 mm/Hr <2   Sodium 136 - 145 mmol/L 143   Potassium 3.5 - 5.1 mmol/L 5.4 (H)   Chloride 95 - 110 mmol/L 108   CO2 23 - 29 mmol/L 24   Anion Gap 8 - 16 mmol/L 11   BUN 6 - 20 mg/dL 34 (H)   Creatinine 0.5 - 1.4 mg/dL 0.9   eGFR >60 mL/min/1.73 m^2 >60   Glucose 70 - 110 mg/dL 92   Calcium 8.7 - 10.5 mg/dL 9.5   ALP 55 - 135 U/L 62   PROTEIN TOTAL 6.0 - 8.4 g/dL 6.5   Albumin 3.5 - 5.2 g/dL 4.0   BILIRUBIN TOTAL 0.1 - 1.0 mg/dL 0.3   AST 10 - 40 U/L 26   ALT 10 - 44 U/L 25   CRP 0.0 - 8.2 mg/L 0.1   CCP Antibodies <5.0 U/mL 103.9 (H)   (H): Data is abnormally high  (L): Data is abnormally low    Severe osteoporosis with fragility fracture and T-score of L1 at -3.4.  She was advised to start Evenity due to severe osteoporosis last visit.  She decided not to pursue osteoporosis therapy because of potential risk with the medications.  Repeat DEXA scan.  Re-emphasized the importance of osteoporosis therapy.  Well controlled rheumatoid on Plaquenil and Rinvoq.  Continue the same.  Consider reduction of Plaquenil dose in future.  Advised importance of yearly Plaquenil clearance from ophthalmologist.  Back x-ray bilateral hands and feet today to monitor erosions.  I have explained all of the above in detail and the patient understands all of the current recommendation(s). I have  answered all questions to the best of my ability and to their complete satisfaction.       # Follow up in about 6 months (around 4/12/2024).      Disclaimer: This note was prepared using voice recognition system and is likely to have sound alike errors and is not proof read.  Please call me with any questions.

## 2023-10-20 ENCOUNTER — PATIENT MESSAGE (OUTPATIENT)
Dept: RHEUMATOLOGY | Facility: CLINIC | Age: 53
End: 2023-10-20
Payer: COMMERCIAL

## 2023-10-31 ENCOUNTER — APPOINTMENT (OUTPATIENT)
Dept: RADIOLOGY | Facility: HOSPITAL | Age: 53
End: 2023-10-31
Attending: INTERNAL MEDICINE
Payer: COMMERCIAL

## 2023-10-31 DIAGNOSIS — M81.0 AGE-RELATED OSTEOPOROSIS WITHOUT CURRENT PATHOLOGICAL FRACTURE: ICD-10-CM

## 2023-10-31 PROCEDURE — 77080 DXA BONE DENSITY AXIAL: CPT | Mod: TC

## 2023-10-31 PROCEDURE — 77080 DXA BONE DENSITY AXIAL SKELETON 1 OR MORE SITES: ICD-10-PCS | Mod: 26,,, | Performed by: RADIOLOGY

## 2023-10-31 PROCEDURE — 77080 DXA BONE DENSITY AXIAL: CPT | Mod: 26,,, | Performed by: RADIOLOGY

## 2023-11-02 NOTE — PROGRESS NOTES
DEXA scan shows persistent severe osteoporosis with-2% compared to last visit.  Consider osteoporosis therapy as advised.  Dr. MEHTA

## 2024-01-10 ENCOUNTER — PATIENT MESSAGE (OUTPATIENT)
Dept: RHEUMATOLOGY | Facility: CLINIC | Age: 54
End: 2024-01-10
Payer: COMMERCIAL

## 2024-01-10 RX ORDER — PREDNISONE 5 MG/1
5 TABLET ORAL DAILY
Qty: 30 TABLET | Refills: 2 | Status: SHIPPED | OUTPATIENT
Start: 2024-01-10

## 2024-01-26 ENCOUNTER — PATIENT MESSAGE (OUTPATIENT)
Dept: RHEUMATOLOGY | Facility: CLINIC | Age: 54
End: 2024-01-26
Payer: COMMERCIAL

## 2024-03-18 ENCOUNTER — PATIENT MESSAGE (OUTPATIENT)
Dept: RHEUMATOLOGY | Facility: CLINIC | Age: 54
End: 2024-03-18
Payer: COMMERCIAL

## 2024-03-18 DIAGNOSIS — M05.79 RHEUMATOID ARTHRITIS INVOLVING MULTIPLE SITES WITH POSITIVE RHEUMATOID FACTOR: Primary | ICD-10-CM

## 2024-04-22 ENCOUNTER — LAB VISIT (OUTPATIENT)
Dept: LAB | Facility: HOSPITAL | Age: 54
End: 2024-04-22
Attending: INTERNAL MEDICINE
Payer: COMMERCIAL

## 2024-04-22 ENCOUNTER — PATIENT MESSAGE (OUTPATIENT)
Dept: RHEUMATOLOGY | Facility: CLINIC | Age: 54
End: 2024-04-22
Payer: COMMERCIAL

## 2024-04-22 DIAGNOSIS — Z79.899 DRUG-INDUCED IMMUNODEFICIENCY: ICD-10-CM

## 2024-04-22 DIAGNOSIS — D84.821 DRUG-INDUCED IMMUNODEFICIENCY: ICD-10-CM

## 2024-04-22 DIAGNOSIS — Z51.81 ENCOUNTER FOR MEDICATION MONITORING: ICD-10-CM

## 2024-04-22 DIAGNOSIS — M05.79 RHEUMATOID ARTHRITIS INVOLVING MULTIPLE SITES WITH POSITIVE RHEUMATOID FACTOR: ICD-10-CM

## 2024-04-22 LAB
ALBUMIN SERPL BCP-MCNC: 3.9 G/DL (ref 3.5–5.2)
ALP SERPL-CCNC: 70 U/L (ref 55–135)
ALT SERPL W/O P-5'-P-CCNC: 29 U/L (ref 10–44)
ANION GAP SERPL CALC-SCNC: 9 MMOL/L (ref 8–16)
AST SERPL-CCNC: 27 U/L (ref 10–40)
BASOPHILS # BLD AUTO: 0.04 K/UL (ref 0–0.2)
BASOPHILS NFR BLD: 1 % (ref 0–1.9)
BILIRUB SERPL-MCNC: 0.2 MG/DL (ref 0.1–1)
BUN SERPL-MCNC: 33 MG/DL (ref 6–20)
CALCIUM SERPL-MCNC: 9.4 MG/DL (ref 8.7–10.5)
CHLORIDE SERPL-SCNC: 107 MMOL/L (ref 95–110)
CO2 SERPL-SCNC: 25 MMOL/L (ref 23–29)
CREAT SERPL-MCNC: 1.7 MG/DL (ref 0.5–1.4)
CRP SERPL-MCNC: 2.6 MG/L (ref 0–8.2)
DIFFERENTIAL METHOD BLD: ABNORMAL
EOSINOPHIL # BLD AUTO: 0.1 K/UL (ref 0–0.5)
EOSINOPHIL NFR BLD: 2 % (ref 0–8)
ERYTHROCYTE [DISTWIDTH] IN BLOOD BY AUTOMATED COUNT: 13.2 % (ref 11.5–14.5)
ERYTHROCYTE [SEDIMENTATION RATE] IN BLOOD BY PHOTOMETRIC METHOD: 6 MM/HR (ref 0–36)
EST. GFR  (NO RACE VARIABLE): 35 ML/MIN/1.73 M^2
GLUCOSE SERPL-MCNC: 92 MG/DL (ref 70–110)
HCT VFR BLD AUTO: 40 % (ref 37–48.5)
HGB BLD-MCNC: 13.4 G/DL (ref 12–16)
IMM GRANULOCYTES # BLD AUTO: 0 K/UL (ref 0–0.04)
IMM GRANULOCYTES NFR BLD AUTO: 0 % (ref 0–0.5)
LYMPHOCYTES # BLD AUTO: 1.3 K/UL (ref 1–4.8)
LYMPHOCYTES NFR BLD: 33.3 % (ref 18–48)
MCH RBC QN AUTO: 31.3 PG (ref 27–31)
MCHC RBC AUTO-ENTMCNC: 33.5 G/DL (ref 32–36)
MCV RBC AUTO: 94 FL (ref 82–98)
MONOCYTES # BLD AUTO: 0.5 K/UL (ref 0.3–1)
MONOCYTES NFR BLD: 12.7 % (ref 4–15)
NEUTROPHILS # BLD AUTO: 2 K/UL (ref 1.8–7.7)
NEUTROPHILS NFR BLD: 51 % (ref 38–73)
NRBC BLD-RTO: 0 /100 WBC
PLATELET # BLD AUTO: 252 K/UL (ref 150–450)
PMV BLD AUTO: 9.3 FL (ref 9.2–12.9)
POTASSIUM SERPL-SCNC: 4.9 MMOL/L (ref 3.5–5.1)
PROT SERPL-MCNC: 6.5 G/DL (ref 6–8.4)
RBC # BLD AUTO: 4.28 M/UL (ref 4–5.4)
SODIUM SERPL-SCNC: 141 MMOL/L (ref 136–145)
WBC # BLD AUTO: 3.93 K/UL (ref 3.9–12.7)

## 2024-04-22 PROCEDURE — 85652 RBC SED RATE AUTOMATED: CPT | Performed by: INTERNAL MEDICINE

## 2024-04-22 PROCEDURE — 85025 COMPLETE CBC W/AUTO DIFF WBC: CPT | Performed by: INTERNAL MEDICINE

## 2024-04-22 PROCEDURE — 86140 C-REACTIVE PROTEIN: CPT | Performed by: INTERNAL MEDICINE

## 2024-04-22 PROCEDURE — 36415 COLL VENOUS BLD VENIPUNCTURE: CPT | Performed by: INTERNAL MEDICINE

## 2024-04-22 PROCEDURE — 80053 COMPREHEN METABOLIC PANEL: CPT | Performed by: INTERNAL MEDICINE

## 2024-04-24 ENCOUNTER — OFFICE VISIT (OUTPATIENT)
Dept: RHEUMATOLOGY | Facility: CLINIC | Age: 54
End: 2024-04-24
Payer: COMMERCIAL

## 2024-04-24 ENCOUNTER — LAB VISIT (OUTPATIENT)
Dept: LAB | Facility: HOSPITAL | Age: 54
End: 2024-04-24
Attending: INTERNAL MEDICINE
Payer: COMMERCIAL

## 2024-04-24 ENCOUNTER — PATIENT MESSAGE (OUTPATIENT)
Dept: RHEUMATOLOGY | Facility: CLINIC | Age: 54
End: 2024-04-24

## 2024-04-24 VITALS
SYSTOLIC BLOOD PRESSURE: 119 MMHG | BODY MASS INDEX: 24.76 KG/M2 | HEART RATE: 78 BPM | DIASTOLIC BLOOD PRESSURE: 75 MMHG | WEIGHT: 158.06 LBS

## 2024-04-24 DIAGNOSIS — Z79.899 DRUG-INDUCED IMMUNODEFICIENCY: ICD-10-CM

## 2024-04-24 DIAGNOSIS — M81.0 AGE-RELATED OSTEOPOROSIS WITHOUT CURRENT PATHOLOGICAL FRACTURE: ICD-10-CM

## 2024-04-24 DIAGNOSIS — R79.89 ELEVATED SERUM CREATININE: ICD-10-CM

## 2024-04-24 DIAGNOSIS — M05.79 RHEUMATOID ARTHRITIS INVOLVING MULTIPLE SITES WITH POSITIVE RHEUMATOID FACTOR: Primary | ICD-10-CM

## 2024-04-24 DIAGNOSIS — Z51.81 ENCOUNTER FOR MEDICATION MONITORING: ICD-10-CM

## 2024-04-24 DIAGNOSIS — D84.821 DRUG-INDUCED IMMUNODEFICIENCY: ICD-10-CM

## 2024-04-24 LAB
CREAT SERPL-MCNC: 1.3 MG/DL (ref 0.5–1.4)
EST. GFR  (NO RACE VARIABLE): 48 ML/MIN/1.73 M^2

## 2024-04-24 PROCEDURE — 36415 COLL VENOUS BLD VENIPUNCTURE: CPT | Performed by: INTERNAL MEDICINE

## 2024-04-24 PROCEDURE — 3074F SYST BP LT 130 MM HG: CPT | Mod: CPTII,S$GLB,, | Performed by: INTERNAL MEDICINE

## 2024-04-24 PROCEDURE — 3078F DIAST BP <80 MM HG: CPT | Mod: CPTII,S$GLB,, | Performed by: INTERNAL MEDICINE

## 2024-04-24 PROCEDURE — 3008F BODY MASS INDEX DOCD: CPT | Mod: CPTII,S$GLB,, | Performed by: INTERNAL MEDICINE

## 2024-04-24 PROCEDURE — 99215 OFFICE O/P EST HI 40 MIN: CPT | Mod: S$GLB,,, | Performed by: INTERNAL MEDICINE

## 2024-04-24 PROCEDURE — 1159F MED LIST DOCD IN RCRD: CPT | Mod: CPTII,S$GLB,, | Performed by: INTERNAL MEDICINE

## 2024-04-24 PROCEDURE — 82565 ASSAY OF CREATININE: CPT | Performed by: INTERNAL MEDICINE

## 2024-04-24 PROCEDURE — 1160F RVW MEDS BY RX/DR IN RCRD: CPT | Mod: CPTII,S$GLB,, | Performed by: INTERNAL MEDICINE

## 2024-04-24 PROCEDURE — 99999 PR PBB SHADOW E&M-EST. PATIENT-LVL III: CPT | Mod: PBBFAC,,, | Performed by: INTERNAL MEDICINE

## 2024-04-24 RX ORDER — HYDROXYCHLOROQUINE SULFATE 200 MG/1
200 TABLET, FILM COATED ORAL
Qty: 90 TABLET | Refills: 0 | Status: SHIPPED | OUTPATIENT
Start: 2024-04-24

## 2024-04-24 NOTE — PROGRESS NOTES
Renal functions are improved but still abnormal.  I would recommend consulting with the nephrologist as advised.  Dr. MEHTA

## 2024-04-24 NOTE — PROGRESS NOTES
RHEUMATOLOGY CLINIC FOLLOW UP VISIT  Chief complaints, HPI, ROS, EXAM, Assessment & Plans:-  Elizabeth Paulino a 54 y.o. pleasant female comes in for follow-up visit.  Seropositive rheumatoid arthritis doing well on Rinvoq and Plaquenil here for follow-up visit.  Fragility fracture left wrist in 2022 managed surgically.  Healed well..  Not taking prednisone.  Not on any anti resorptive therapy for recently diagnosed osteoporosis because of concerns about medications.  Rheumatological review of system negative otherwise.  Physical examination shows no synovitis.  1. Rheumatoid arthritis involving multiple sites with positive rheumatoid factor    2. Age-related osteoporosis without current pathological fracture    3. Drug-induced immunodeficiency    4. Encounter for medication monitoring    5. Elevated serum creatinine      Problem List Items Addressed This Visit       Rheumatoid arthritis involving multiple sites with positive rheumatoid factor - Primary (Chronic)    Relevant Medications    hydroxychloroquine (PLAQUENIL) 200 mg tablet    Age-related osteoporosis without current pathological fracture    Drug-induced immunodeficiency    Elevated serum creatinine    Relevant Orders    CREATININE, SERUM    Ambulatory referral/consult to Nephrology    Encounter for medication monitoring       Labs reviewed today:-   Latest Reference Range & Units 04/22/24 16:10   WBC 3.90 - 12.70 K/uL 3.93   RBC 4.00 - 5.40 M/uL 4.28   Hemoglobin 12.0 - 16.0 g/dL 13.4   Hematocrit 37.0 - 48.5 % 40.0   MCV 82 - 98 fL 94   MCH 27.0 - 31.0 pg 31.3 (H)   MCHC 32.0 - 36.0 g/dL 33.5   RDW 11.5 - 14.5 % 13.2   Platelet Count 150 - 450 K/uL 252   MPV 9.2 - 12.9 fL 9.3   Gran % 38.0 - 73.0 % 51.0   Lymph % 18.0 - 48.0 % 33.3   Mono % 4.0 - 15.0 % 12.7   Eos % 0.0 - 8.0 % 2.0   Basophil % 0.0 - 1.9 % 1.0   Immature Granulocytes 0.0 - 0.5 % 0.0   Gran # (ANC) 1.8 - 7.7 K/uL 2.0   Lymph # 1.0 - 4.8  K/uL 1.3   Mono # 0.3 - 1.0 K/uL 0.5   Eos # 0.0 - 0.5 K/uL 0.1   Baso # 0.00 - 0.20 K/uL 0.04   Immature Grans (Abs) 0.00 - 0.04 K/uL 0.00   nRBC 0 /100 WBC 0   Differential Method  Automated   Sed Rate 0 - 36 mm/Hr 6   Sodium 136 - 145 mmol/L 141   Potassium 3.5 - 5.1 mmol/L 4.9   Chloride 95 - 110 mmol/L 107   CO2 23 - 29 mmol/L 25   Anion Gap 8 - 16 mmol/L 9   BUN 6 - 20 mg/dL 33 (H)   Creatinine 0.5 - 1.4 mg/dL 1.7 (H)   eGFR >60 mL/min/1.73 m^2 35 !   Glucose 70 - 110 mg/dL 92   Calcium 8.7 - 10.5 mg/dL 9.4   ALP 55 - 135 U/L 70   PROTEIN TOTAL 6.0 - 8.4 g/dL 6.5   Albumin 3.5 - 5.2 g/dL 3.9   BILIRUBIN TOTAL 0.1 - 1.0 mg/dL 0.2   AST 10 - 40 U/L 27   ALT 10 - 44 U/L 29   CRP 0.0 - 8.2 mg/L 2.6   (H): Data is abnormally high  !: Data is abnormal     Latest Reference Range & Units Most Recent   BEN Neg <1:160  Negative  4/4/12 11:42   BEN Screen Negative <1:80  Positive !  4/27/21 14:55   BEN HEP-2 Titer  Positive 1:160 Homogeneous  10/10/13 15:22   BEN Titer 1  1:320  4/27/21 14:55   BEN PATTERN 1  Homogeneous  4/27/21 14:55   ds DNA Ab Negative 1:10  Negative 1:10  4/27/21 14:55   Anti-SSA Antibody 0.00 - 0.99 Ratio 0.05  4/27/21 14:55   Anti-SSA Interpretation Negative  Negative  4/27/21 14:55   Anti-SSB Antibody 0.00 - 0.99 Ratio 0.10  4/27/21 14:55   Anti-SSB Interpretation Negative  Negative  4/27/21 14:55   Anti Sm Antibody 0.00 - 0.99 Ratio 0.09  4/27/21 14:55   Anti-Sm Interpretation Negative  Negative  4/27/21 14:55   Anti Sm/RNP Antibody 0.00 - 0.99 Ratio 0.08  4/27/21 14:55   Anti-Sm/RNP Interpretation Negative  Negative  4/27/21 14:55   CCP Antibodies <5.0 U/mL 103.9 (H)  10/9/23 15:54   Rheumatoid Factor 0.0 - 15.0 IU/mL 16.0 (H)  10/11/21 16:36   !: Data is abnormal  (H): Data is abnormally high      Well controlled rheumatoid on Plaquenil and Rinvoq.  No significant flares since reducing the dose of Plaquenil to once daily.  Reduce further to every other day for the next 6 months.  Continue  Rinvoq daily.  Sudden increase in serum creatinine without history to suspect dehydration.  Repeat serum creatinine today and consult nephrology if elevated.  Not on any nephrotoxic drugs.    Continue the same.  Consider reduction of Plaquenil dose in future.  Advised importance of yearly Plaquenil clearance from ophthalmologist.  Back x-ray bilateral hands and feet today to monitor erosions.  I have explained all of the above in detail and the patient understands all of the current recommendation(s). I have answered all questions to the best of my ability and to their complete satisfaction.       # Follow up in about 6 months (around 10/24/2024).      Disclaimer: This note was prepared using voice recognition system and is likely to have sound alike errors and is not proof read.  Please call me with any questions.

## 2024-05-01 ENCOUNTER — PATIENT MESSAGE (OUTPATIENT)
Dept: RHEUMATOLOGY | Facility: CLINIC | Age: 54
End: 2024-05-01
Payer: COMMERCIAL

## 2024-05-10 ENCOUNTER — OFFICE VISIT (OUTPATIENT)
Dept: NEPHROLOGY | Facility: CLINIC | Age: 54
End: 2024-05-10
Payer: COMMERCIAL

## 2024-05-10 ENCOUNTER — LAB VISIT (OUTPATIENT)
Dept: LAB | Facility: HOSPITAL | Age: 54
End: 2024-05-10
Attending: INTERNAL MEDICINE
Payer: COMMERCIAL

## 2024-05-10 VITALS
HEART RATE: 63 BPM | DIASTOLIC BLOOD PRESSURE: 88 MMHG | WEIGHT: 168 LBS | BODY MASS INDEX: 26.37 KG/M2 | HEIGHT: 67 IN | RESPIRATION RATE: 18 BRPM | SYSTOLIC BLOOD PRESSURE: 118 MMHG

## 2024-05-10 DIAGNOSIS — R79.89 ELEVATED SERUM CREATININE: ICD-10-CM

## 2024-05-10 LAB
BILIRUB UR QL STRIP: NEGATIVE
CLARITY UR: CLEAR
COLOR UR: YELLOW
GLUCOSE UR QL STRIP: NEGATIVE
HGB UR QL STRIP: NEGATIVE
KETONES UR QL STRIP: NEGATIVE
LEUKOCYTE ESTERASE UR QL STRIP: NEGATIVE
NITRITE UR QL STRIP: NEGATIVE
PH UR STRIP: 8 [PH] (ref 5–8)
PROT UR QL STRIP: NEGATIVE
SP GR UR STRIP: 1.02 (ref 1–1.03)
URN SPEC COLLECT METH UR: NORMAL

## 2024-05-10 PROCEDURE — 1159F MED LIST DOCD IN RCRD: CPT | Mod: CPTII,S$GLB,, | Performed by: INTERNAL MEDICINE

## 2024-05-10 PROCEDURE — 3079F DIAST BP 80-89 MM HG: CPT | Mod: CPTII,S$GLB,, | Performed by: INTERNAL MEDICINE

## 2024-05-10 PROCEDURE — 3066F NEPHROPATHY DOC TX: CPT | Mod: CPTII,S$GLB,, | Performed by: INTERNAL MEDICINE

## 2024-05-10 PROCEDURE — 99205 OFFICE O/P NEW HI 60 MIN: CPT | Mod: S$GLB,,, | Performed by: INTERNAL MEDICINE

## 2024-05-10 PROCEDURE — 3074F SYST BP LT 130 MM HG: CPT | Mod: CPTII,S$GLB,, | Performed by: INTERNAL MEDICINE

## 2024-05-10 PROCEDURE — 3008F BODY MASS INDEX DOCD: CPT | Mod: CPTII,S$GLB,, | Performed by: INTERNAL MEDICINE

## 2024-05-10 PROCEDURE — 81003 URINALYSIS AUTO W/O SCOPE: CPT | Performed by: INTERNAL MEDICINE

## 2024-05-10 PROCEDURE — 99999 PR PBB SHADOW E&M-EST. PATIENT-LVL IV: CPT | Mod: PBBFAC,,, | Performed by: INTERNAL MEDICINE

## 2024-05-10 RX ORDER — ESTRADIOL 0.05 MG/D
1 FILM, EXTENDED RELEASE TRANSDERMAL
COMMUNITY
Start: 2024-05-04

## 2024-05-10 RX ORDER — FLUTICASONE PROPIONATE 50 MCG
SPRAY, SUSPENSION (ML) NASAL
COMMUNITY
Start: 2023-05-19

## 2024-05-10 RX ORDER — GABAPENTIN 100 MG/1
300 CAPSULE ORAL NIGHTLY
COMMUNITY
Start: 2024-04-24

## 2024-05-10 NOTE — PROGRESS NOTES
"Elizabeth Johns is a 54 y.o. female for whom nephrology consult has been requested to evaluate and give opinion.   Renal clinic consult note:   Date of clinic visit: 5/10/24  Reason for consult: s Cr fluctuations, PRASHANTH  Referring physician: Dr. Boswell    HPI: Thank you for referring the pt to us. H/o and chart were reviewed. Pt was seen and examined. Pt is a 55 y/o female with h/o of RA who was referred to renal clinic for s Cr fluctuations between 0.9 and 1.7. Pt has no acute c/o's, no discomfort. Pt described herself as very healthy and eats healthy and exercises regularly. She has a sauna at home that she uses. The subject of possible dehydration was raised and pt said that to prevent that she drinks large amounts of "[employer] Salt [employer]erals." I was not familiar with this product. The information was sought on the internet. This is a product that contains electrolytes, but alarmingly also contains heavy metals, such as arsenic, lead, and cadmium, among other things. Pt does not take ibuprofen, unless occasionally, no GO losses, no urinary or cardipulm sx's.      PAST MEDICAL HISTORY:  She  has a past medical history of Arthritis, COVID-19 (10/2020), Cystocele, midline (12/2020), Pelvic floor dysfunction in female (12/2020), and Urinary, incontinence, stress female (12/2019).    PAST SURGICAL HISTORY:  She  has a past surgical history that includes nova sure (2009); Eye surgery; breast implant removel; and Hand surgery (Left, 2022).    SOCIAL HISTORY:  She  reports that she has never smoked. She has never used smokeless tobacco. She reports that she does not drink alcohol and does not use drugs.    FAMILY MEDICAL HISTORY:  Her family history includes Arrhythmia in her father; Breast cancer in her maternal grandmother and paternal grandmother; Heart attack in her paternal grandfather; Rheum arthritis in her mother.    Review of patient's allergies indicates:   Allergen Reactions    Tetracycline  " "          Prior to Admission medications    Medication Sig Start Date End Date Taking? Authorizing Provider   ascorbic acid, vitamin C, (VITAMIN C) 1000 MG tablet Take 1,000 mg by mouth.   Yes Provider, Historical   estradiol 0.05 mg/24 hr td ptsw (VIVELLE-DOT) 0.05 mg/24 hr 1 patch twice a week. 5/4/24  Yes Provider, Historical   fish oil-omega-3 fatty acids 300-1,000 mg capsule Take 2 g by mouth.   Yes Provider, Historical   fluticasone propionate (FLONASE) 50 mcg/actuation nasal spray spray 2 sprays (100 mcg) in each nostril once daily Inhalation 1 5/19/23  Yes Provider, Historical   gabapentin (NEURONTIN) 100 MG capsule Take 300 mg by mouth every evening. 4/24/24  Yes Provider, Historical   hydroxychloroquine (PLAQUENIL) 200 mg tablet Take 1 tablet (200 mg total) by mouth 3 (three) times a week. 4/24/24  Yes Alejandro Boswell MD   multivitamin (THERAGRAN) per tablet Take 1 tablet by mouth.   Yes Provider, Historical   naltrexone capsule Take 3.87 mg daily . 3/18/24  Yes Alejandro Boswell MD   predniSONE (DELTASONE) 5 MG tablet Take 1 tablet (5 mg total) by mouth once daily. 1/10/24  Yes Alejandro Boswell MD   RINVOQ 15 mg 24 hr tablet Take 1 tablet (15 mg total) by mouth once daily. 10/12/23  Yes Alejandro Boswell MD   tretinoin (RETIN-A) 0.025 % cream  11/4/22  Yes Provider, Historical   XIIDRA 5 % Dpet once daily.  11/15/18  Yes Provider, Historical        REVIEW OF SYSTEMS:  Patient has no fever, fatigue, visual changes, chest pain, edema, cough, dyspnea, nausea, vomiting, constipation, diarrhea, arthralgias, pruritis, dizziness, weakness, depression, confusion.    PHYSICAL EXAM:   height is 5' 7" (1.702 m) and weight is 76.2 kg (167 lb 15.9 oz). Her blood pressure is 118/88 and her pulse is 63. Her respiration is 18.   Gen: WDWN female in no apparent distress  Psych: Normal mood and affect  Skin: No rashes or ulcers  Neck: No JVD  Chest: Clear with no rales, rhonchi, wheezing with " "normal effort  CV: Regular with no murmurs, gallops or rubs  Abd: Soft, nontender, no distension,  Ext: No edema    Labs reviewed  BMP  Lab Results   Component Value Date     04/22/2024    K 4.9 04/22/2024     04/22/2024    CO2 25 04/22/2024    BUN 33 (H) 04/22/2024    CREATININE 1.3 04/24/2024    CALCIUM 9.4 04/22/2024    ANIONGAP 9 04/22/2024    EGFRNORACEVR 48 (A) 04/24/2024     Lab Results   Component Value Date    WBC 3.93 04/22/2024    HGB 13.4 04/22/2024    HCT 40.0 04/22/2024    MCV 94 04/22/2024     04/22/2024     U/a: no protein, no blood      IMPRESSION AND RECOMMENDATIONS: 53 y/o female presented for the evaluation of s Cr changes:    Renal: s Cr has fluctuate between 0.9 an1.7 (twice) in the past 2 years  Reason not clear.   DDx; dehydration  However, alarmingly pt is drinking large amounts of a product called [employer] Salt and minerals that contains heavy metals!  Heavy metals (cadmium, lead, arsenic) can cause chronic interstitial nephritis  Cadmium toxicity, I recall can cause ouch-ouch (maren-maren) disease    Pt was strongly advised to stop using this product and all food supplements    2. Med review: was done  Takes vit C 1000 mg per day. Risk of kidney stone  Pt was advised daily req is 60 mg    3. Pt appears to be a "excessively healthy":   Advised pt that best way to be healthy is by keeping a balance in diet and life in general  With good, well-rounded diet, no vitamins or supplements are needed.    Plans and recommendations:  As discussed above.  Total time spent 60 minutes including time needed to review the records, the   patient evaluation, documentation, face-to-face discussion with the patient,   more than 50% of the time was spent on coordination of care and counseling.    Level V visit.  RTC 1 month    Stephanie Cates MD                  "

## 2024-06-17 ENCOUNTER — LAB VISIT (OUTPATIENT)
Dept: LAB | Facility: HOSPITAL | Age: 54
End: 2024-06-17
Attending: INTERNAL MEDICINE
Payer: COMMERCIAL

## 2024-06-17 DIAGNOSIS — R79.89 ELEVATED SERUM CREATININE: ICD-10-CM

## 2024-06-17 PROCEDURE — 80069 RENAL FUNCTION PANEL: CPT | Performed by: INTERNAL MEDICINE

## 2024-06-17 PROCEDURE — 36415 COLL VENOUS BLD VENIPUNCTURE: CPT | Performed by: INTERNAL MEDICINE

## 2024-06-18 LAB
ALBUMIN SERPL BCP-MCNC: 3.5 G/DL (ref 3.5–5.2)
ANION GAP SERPL CALC-SCNC: 7 MMOL/L (ref 8–16)
BUN SERPL-MCNC: 31 MG/DL (ref 6–20)
CALCIUM SERPL-MCNC: 8.9 MG/DL (ref 8.7–10.5)
CHLORIDE SERPL-SCNC: 106 MMOL/L (ref 95–110)
CO2 SERPL-SCNC: 24 MMOL/L (ref 23–29)
CREAT SERPL-MCNC: 1.8 MG/DL (ref 0.5–1.4)
EST. GFR  (NO RACE VARIABLE): 32.7 ML/MIN/1.73 M^2
GLUCOSE SERPL-MCNC: 81 MG/DL (ref 70–110)
PHOSPHATE SERPL-MCNC: 3.2 MG/DL (ref 2.7–4.5)
POTASSIUM SERPL-SCNC: 4.7 MMOL/L (ref 3.5–5.1)
SODIUM SERPL-SCNC: 137 MMOL/L (ref 136–145)

## 2024-06-19 ENCOUNTER — PATIENT MESSAGE (OUTPATIENT)
Dept: RHEUMATOLOGY | Facility: CLINIC | Age: 54
End: 2024-06-19
Payer: COMMERCIAL

## 2024-06-19 ENCOUNTER — PATIENT MESSAGE (OUTPATIENT)
Dept: NEPHROLOGY | Facility: CLINIC | Age: 54
End: 2024-06-19
Payer: COMMERCIAL

## 2024-06-20 DIAGNOSIS — Z79.899 LONG TERM CURRENT USE OF IMMUNOSUPPRESSIVE DRUG: Primary | ICD-10-CM

## 2024-06-24 ENCOUNTER — LAB VISIT (OUTPATIENT)
Dept: LAB | Facility: HOSPITAL | Age: 54
End: 2024-06-24
Attending: INTERNAL MEDICINE
Payer: COMMERCIAL

## 2024-06-24 DIAGNOSIS — Z79.899 LONG TERM CURRENT USE OF IMMUNOSUPPRESSIVE DRUG: ICD-10-CM

## 2024-06-24 LAB
ALBUMIN SERPL BCP-MCNC: 3.8 G/DL (ref 3.5–5.2)
ANION GAP SERPL CALC-SCNC: 9 MMOL/L (ref 8–16)
BUN SERPL-MCNC: 32 MG/DL (ref 6–20)
CALCIUM SERPL-MCNC: 9.2 MG/DL (ref 8.7–10.5)
CHLORIDE SERPL-SCNC: 105 MMOL/L (ref 95–110)
CO2 SERPL-SCNC: 24 MMOL/L (ref 23–29)
CREAT SERPL-MCNC: 1.3 MG/DL (ref 0.5–1.4)
EST. GFR  (NO RACE VARIABLE): 48.3 ML/MIN/1.73 M^2
GLUCOSE SERPL-MCNC: 89 MG/DL (ref 70–110)
PHOSPHATE SERPL-MCNC: 3.8 MG/DL (ref 2.7–4.5)
POTASSIUM SERPL-SCNC: 4.2 MMOL/L (ref 3.5–5.1)
SODIUM SERPL-SCNC: 138 MMOL/L (ref 136–145)

## 2024-06-24 PROCEDURE — 36415 COLL VENOUS BLD VENIPUNCTURE: CPT | Performed by: INTERNAL MEDICINE

## 2024-06-24 PROCEDURE — 80069 RENAL FUNCTION PANEL: CPT | Performed by: INTERNAL MEDICINE

## 2024-06-25 ENCOUNTER — PATIENT MESSAGE (OUTPATIENT)
Dept: RHEUMATOLOGY | Facility: CLINIC | Age: 54
End: 2024-06-25
Payer: COMMERCIAL

## 2024-07-13 ENCOUNTER — PATIENT MESSAGE (OUTPATIENT)
Dept: RHEUMATOLOGY | Facility: CLINIC | Age: 54
End: 2024-07-13
Payer: COMMERCIAL

## 2024-07-31 ENCOUNTER — PATIENT MESSAGE (OUTPATIENT)
Dept: RHEUMATOLOGY | Facility: CLINIC | Age: 54
End: 2024-07-31
Payer: COMMERCIAL

## 2024-08-01 RX ORDER — PREDNISONE 5 MG/1
5 TABLET ORAL DAILY
Qty: 30 TABLET | Refills: 2 | Status: SHIPPED | OUTPATIENT
Start: 2024-08-01

## 2024-08-27 ENCOUNTER — PATIENT MESSAGE (OUTPATIENT)
Dept: RHEUMATOLOGY | Facility: CLINIC | Age: 54
End: 2024-08-27
Payer: COMMERCIAL

## 2024-09-01 DIAGNOSIS — M05.79 RHEUMATOID ARTHRITIS INVOLVING MULTIPLE SITES WITH POSITIVE RHEUMATOID FACTOR: ICD-10-CM

## 2024-09-03 RX ORDER — HYDROXYCHLOROQUINE SULFATE 200 MG/1
TABLET, FILM COATED ORAL
Qty: 180 TABLET | Refills: 3 | Status: SHIPPED | OUTPATIENT
Start: 2024-09-03

## 2024-09-04 ENCOUNTER — OFFICE VISIT (OUTPATIENT)
Dept: RHEUMATOLOGY | Facility: CLINIC | Age: 54
End: 2024-09-04
Payer: COMMERCIAL

## 2024-09-04 DIAGNOSIS — Z51.81 ENCOUNTER FOR MEDICATION MONITORING: ICD-10-CM

## 2024-09-04 DIAGNOSIS — D84.821 DRUG-INDUCED IMMUNODEFICIENCY: ICD-10-CM

## 2024-09-04 DIAGNOSIS — M05.79 RHEUMATOID ARTHRITIS INVOLVING MULTIPLE SITES WITH POSITIVE RHEUMATOID FACTOR: Primary | ICD-10-CM

## 2024-09-04 DIAGNOSIS — M81.0 AGE-RELATED OSTEOPOROSIS WITHOUT CURRENT PATHOLOGICAL FRACTURE: ICD-10-CM

## 2024-09-04 DIAGNOSIS — Z79.899 DRUG-INDUCED IMMUNODEFICIENCY: ICD-10-CM

## 2024-09-04 PROCEDURE — G2211 COMPLEX E/M VISIT ADD ON: HCPCS | Mod: 95,,, | Performed by: INTERNAL MEDICINE

## 2024-09-04 PROCEDURE — 1160F RVW MEDS BY RX/DR IN RCRD: CPT | Mod: CPTII,95,, | Performed by: INTERNAL MEDICINE

## 2024-09-04 PROCEDURE — 1159F MED LIST DOCD IN RCRD: CPT | Mod: CPTII,95,, | Performed by: INTERNAL MEDICINE

## 2024-09-04 PROCEDURE — 3066F NEPHROPATHY DOC TX: CPT | Mod: CPTII,95,, | Performed by: INTERNAL MEDICINE

## 2024-09-04 PROCEDURE — 99214 OFFICE O/P EST MOD 30 MIN: CPT | Mod: 95,,, | Performed by: INTERNAL MEDICINE

## 2024-09-04 RX ORDER — TRAMADOL HYDROCHLORIDE 50 MG/1
50 TABLET ORAL EVERY 12 HOURS PRN
Qty: 14 TABLET | Refills: 0 | Status: SHIPPED | OUTPATIENT
Start: 2024-09-04 | End: 2024-09-11

## 2024-09-04 NOTE — PROGRESS NOTES
RHEUMATOLOGY FOLLOW UP - TELE VISIT     The patient location is: [place]  The chief complaint leading to consultation is:  Follow-up for rheumatoid arthritis  Visit type: Virtual visit with synchronous audio and video  Total time spent with patient:  10 minutes  Each patient to whom he or she provides medical services by telemedicine is:  (1) informed of the relationship between the physician and patient and the respective role of any other health care provider with respect to management of the patient; and (2) notified that he or she may decline to receive medical services by telemedicine and may withdraw from such care at any time.    Chief complaints, HPI, ROS, EXAM, Assessment & Plans:-  Elizabeth PYLE Abielvernons a 54 y.o. pleasant female comes in for follow-up visit.  Seropositive rheumatoid arthritis doing well on Rinvoq and Plaquenil here for follow-up visit.  Fragility fracture left wrist in 2022 managed surgically.  Healed well..  Not taking prednisone.  Not on any anti resorptive therapy for recently diagnosed osteoporosis because of concerns about medications.  Rheumatological review of system negative otherwise.  Physical examination shows no synovitis.  1. Rheumatoid arthritis involving multiple sites with positive rheumatoid factor    2. Age-related osteoporosis without current pathological fracture    3. Drug-induced immunodeficiency    4. Encounter for medication monitoring      Problem List Items Addressed This Visit       Rheumatoid arthritis involving multiple sites with positive rheumatoid factor - Primary (Chronic)    Relevant Medications    traMADoL (ULTRAM) 50 mg tablet    Age-related osteoporosis without current pathological fracture    Drug-induced immunodeficiency    Encounter for medication monitoring       Labs reviewed today:-   Latest Reference Range & Units 06/24/24 15:58   Sodium 136 - 145 mmol/L 138   Potassium 3.5 - 5.1 mmol/L 4.2   Chloride 95 - 110  mmol/L 105   CO2 23 - 29 mmol/L 24   Anion Gap 8 - 16 mmol/L 9   BUN 6 - 20 mg/dL 32 (H)   Creatinine 0.5 - 1.4 mg/dL 1.3   eGFR >60 mL/min/1.73 m^2 48.3 !   Glucose 70 - 110 mg/dL 89   Calcium 8.7 - 10.5 mg/dL 9.2   Phosphorus Level 2.7 - 4.5 mg/dL 3.8   Albumin 3.5 - 5.2 g/dL 3.8   (H): Data is abnormally high  !: Data is abnormal     Latest Reference Range & Units Most Recent   BEN Neg <1:160  Negative  4/4/12 11:42   BEN Screen Negative <1:80  Positive !  4/27/21 14:55   BEN HEP-2 Titer  Positive 1:160 Homogeneous  10/10/13 15:22   BEN Titer 1  1:320  4/27/21 14:55   BEN PATTERN 1  Homogeneous  4/27/21 14:55   ds DNA Ab Negative 1:10  Negative 1:10  4/27/21 14:55   Anti-SSA Antibody 0.00 - 0.99 Ratio 0.05  4/27/21 14:55   Anti-SSA Interpretation Negative  Negative  4/27/21 14:55   Anti-SSB Antibody 0.00 - 0.99 Ratio 0.10  4/27/21 14:55   Anti-SSB Interpretation Negative  Negative  4/27/21 14:55   Anti Sm Antibody 0.00 - 0.99 Ratio 0.09  4/27/21 14:55   Anti-Sm Interpretation Negative  Negative  4/27/21 14:55   Anti Sm/RNP Antibody 0.00 - 0.99 Ratio 0.08  4/27/21 14:55   Anti-Sm/RNP Interpretation Negative  Negative  4/27/21 14:55   CCP Antibodies <5.0 U/mL 103.9 (H)  10/9/23 15:54   Rheumatoid Factor 0.0 - 15.0 IU/mL 16.0 (H)  10/11/21 16:36   !: Data is abnormal  (H): Data is abnormally high      Well controlled rheumatoid on Plaquenil and Rinvoq.  No significant flares since reducing the dose of Plaquenil to once daily.  Consider Plaquenil further to every other day next visit..  Continue Rinvoq daily.  Evaluated by nephrologist for elevated serum attributed to creatine supplements .  Continue follow-up with Nephrology.   Consider reduction of Plaquenil dose in future.  Advised importance of yearly Plaquenil clearance from ophthalmologist.  I have explained all of the above in detail and the patient understands all of the current recommendation(s). I have answered all questions to the best of my ability and to  their complete satisfaction.       # Follow up in about 6 months (around 3/4/2025).      Disclaimer: This note was prepared using voice recognition system and is likely to have sound alike errors and is not proof read.  Please call me with any questions.

## 2024-09-09 ENCOUNTER — PATIENT MESSAGE (OUTPATIENT)
Dept: RHEUMATOLOGY | Facility: CLINIC | Age: 54
End: 2024-09-09
Payer: COMMERCIAL

## 2024-09-12 ENCOUNTER — LAB VISIT (OUTPATIENT)
Dept: LAB | Facility: HOSPITAL | Age: 54
End: 2024-09-12
Attending: INTERNAL MEDICINE
Payer: COMMERCIAL

## 2024-09-12 DIAGNOSIS — D84.821 DRUG-INDUCED IMMUNODEFICIENCY: ICD-10-CM

## 2024-09-12 DIAGNOSIS — Z79.899 DRUG-INDUCED IMMUNODEFICIENCY: ICD-10-CM

## 2024-09-12 DIAGNOSIS — M05.79 RHEUMATOID ARTHRITIS INVOLVING MULTIPLE SITES WITH POSITIVE RHEUMATOID FACTOR: ICD-10-CM

## 2024-09-12 DIAGNOSIS — Z51.81 ENCOUNTER FOR MEDICATION MONITORING: ICD-10-CM

## 2024-09-12 LAB
ALBUMIN SERPL BCP-MCNC: 3.8 G/DL (ref 3.5–5.2)
ALP SERPL-CCNC: 68 U/L (ref 55–135)
ALT SERPL W/O P-5'-P-CCNC: 27 U/L (ref 10–44)
ANION GAP SERPL CALC-SCNC: 7 MMOL/L (ref 8–16)
AST SERPL-CCNC: 26 U/L (ref 10–40)
BASOPHILS # BLD AUTO: 0.05 K/UL (ref 0–0.2)
BASOPHILS NFR BLD: 0.8 % (ref 0–1.9)
BILIRUB SERPL-MCNC: 0.2 MG/DL (ref 0.1–1)
BUN SERPL-MCNC: 37 MG/DL (ref 6–20)
CALCIUM SERPL-MCNC: 9.3 MG/DL (ref 8.7–10.5)
CHLORIDE SERPL-SCNC: 109 MMOL/L (ref 95–110)
CO2 SERPL-SCNC: 24 MMOL/L (ref 23–29)
CREAT SERPL-MCNC: 1.3 MG/DL (ref 0.5–1.4)
CRP SERPL-MCNC: 0.1 MG/L (ref 0–8.2)
DIFFERENTIAL METHOD BLD: ABNORMAL
EOSINOPHIL # BLD AUTO: 0.2 K/UL (ref 0–0.5)
EOSINOPHIL NFR BLD: 2.7 % (ref 0–8)
ERYTHROCYTE [DISTWIDTH] IN BLOOD BY AUTOMATED COUNT: 12.7 % (ref 11.5–14.5)
ERYTHROCYTE [SEDIMENTATION RATE] IN BLOOD BY PHOTOMETRIC METHOD: 7 MM/HR (ref 0–36)
EST. GFR  (NO RACE VARIABLE): 48 ML/MIN/1.73 M^2
GLUCOSE SERPL-MCNC: 95 MG/DL (ref 70–110)
HCT VFR BLD AUTO: 39.8 % (ref 37–48.5)
HGB BLD-MCNC: 13.4 G/DL (ref 12–16)
IMM GRANULOCYTES # BLD AUTO: 0.01 K/UL (ref 0–0.04)
IMM GRANULOCYTES NFR BLD AUTO: 0.2 % (ref 0–0.5)
LYMPHOCYTES # BLD AUTO: 1.6 K/UL (ref 1–4.8)
LYMPHOCYTES NFR BLD: 24.8 % (ref 18–48)
MCH RBC QN AUTO: 32.4 PG (ref 27–31)
MCHC RBC AUTO-ENTMCNC: 33.7 G/DL (ref 32–36)
MCV RBC AUTO: 96 FL (ref 82–98)
MONOCYTES # BLD AUTO: 0.6 K/UL (ref 0.3–1)
MONOCYTES NFR BLD: 8.6 % (ref 4–15)
NEUTROPHILS # BLD AUTO: 4.2 K/UL (ref 1.8–7.7)
NEUTROPHILS NFR BLD: 62.9 % (ref 38–73)
NRBC BLD-RTO: 0 /100 WBC
PLATELET # BLD AUTO: 273 K/UL (ref 150–450)
PMV BLD AUTO: 9.7 FL (ref 9.2–12.9)
POTASSIUM SERPL-SCNC: 4.6 MMOL/L (ref 3.5–5.1)
PROT SERPL-MCNC: 6.2 G/DL (ref 6–8.4)
RBC # BLD AUTO: 4.13 M/UL (ref 4–5.4)
SODIUM SERPL-SCNC: 140 MMOL/L (ref 136–145)
WBC # BLD AUTO: 6.61 K/UL (ref 3.9–12.7)

## 2024-09-12 PROCEDURE — 80053 COMPREHEN METABOLIC PANEL: CPT | Performed by: INTERNAL MEDICINE

## 2024-09-12 PROCEDURE — 85025 COMPLETE CBC W/AUTO DIFF WBC: CPT | Performed by: INTERNAL MEDICINE

## 2024-09-12 PROCEDURE — 85652 RBC SED RATE AUTOMATED: CPT | Performed by: INTERNAL MEDICINE

## 2024-09-12 PROCEDURE — 86140 C-REACTIVE PROTEIN: CPT | Performed by: INTERNAL MEDICINE

## 2024-09-12 PROCEDURE — 36415 COLL VENOUS BLD VENIPUNCTURE: CPT | Performed by: INTERNAL MEDICINE

## 2024-10-09 ENCOUNTER — TELEPHONE (OUTPATIENT)
Dept: RHEUMATOLOGY | Facility: CLINIC | Age: 54
End: 2024-10-09
Payer: COMMERCIAL

## 2024-10-09 DIAGNOSIS — Z51.81 MEDICATION MONITORING ENCOUNTER: Primary | ICD-10-CM

## 2024-10-09 DIAGNOSIS — M05.79 RHEUMATOID ARTHRITIS INVOLVING MULTIPLE SITES WITH POSITIVE RHEUMATOID FACTOR: ICD-10-CM

## 2024-10-09 RX ORDER — UPADACITINIB 15 MG/1
15 TABLET, EXTENDED RELEASE ORAL DAILY
Qty: 30 TABLET | Refills: 11 | Status: ACTIVE | OUTPATIENT
Start: 2024-10-09

## 2025-03-01 ENCOUNTER — PATIENT MESSAGE (OUTPATIENT)
Dept: RHEUMATOLOGY | Facility: CLINIC | Age: 55
End: 2025-03-01
Payer: COMMERCIAL

## 2025-03-10 ENCOUNTER — LAB VISIT (OUTPATIENT)
Dept: LAB | Facility: HOSPITAL | Age: 55
End: 2025-03-10
Attending: INTERNAL MEDICINE
Payer: COMMERCIAL

## 2025-03-10 DIAGNOSIS — Z79.899 LONG TERM CURRENT USE OF IMMUNOSUPPRESSIVE DRUG: ICD-10-CM

## 2025-03-10 DIAGNOSIS — R79.89 ELEVATED SERUM CREATININE: ICD-10-CM

## 2025-03-10 DIAGNOSIS — Z51.81 MEDICATION MONITORING ENCOUNTER: ICD-10-CM

## 2025-03-10 LAB
ALBUMIN SERPL BCP-MCNC: 3.9 G/DL (ref 3.5–5.2)
ALP SERPL-CCNC: 65 U/L (ref 40–150)
ALT SERPL W/O P-5'-P-CCNC: 35 U/L (ref 10–44)
ANION GAP SERPL CALC-SCNC: 11 MMOL/L (ref 8–16)
AST SERPL-CCNC: 26 U/L (ref 10–40)
BASOPHILS # BLD AUTO: 0.05 K/UL (ref 0–0.2)
BASOPHILS NFR BLD: 0.7 % (ref 0–1.9)
BILIRUB SERPL-MCNC: 0.2 MG/DL (ref 0.1–1)
BUN SERPL-MCNC: 32 MG/DL (ref 6–20)
CALCIUM SERPL-MCNC: 9.6 MG/DL (ref 8.7–10.5)
CHLORIDE SERPL-SCNC: 107 MMOL/L (ref 95–110)
CHOLEST SERPL-MCNC: 219 MG/DL (ref 120–199)
CHOLEST/HDLC SERPL: 2.3 {RATIO} (ref 2–5)
CO2 SERPL-SCNC: 23 MMOL/L (ref 23–29)
CREAT SERPL-MCNC: 1.3 MG/DL (ref 0.5–1.4)
CRP SERPL-MCNC: 0.5 MG/L (ref 0–8.2)
DIFFERENTIAL METHOD BLD: ABNORMAL
EOSINOPHIL # BLD AUTO: 0.2 K/UL (ref 0–0.5)
EOSINOPHIL NFR BLD: 2.3 % (ref 0–8)
ERYTHROCYTE [DISTWIDTH] IN BLOOD BY AUTOMATED COUNT: 12.7 % (ref 11.5–14.5)
EST. GFR  (NO RACE VARIABLE): 48 ML/MIN/1.73 M^2
GLUCOSE SERPL-MCNC: 93 MG/DL (ref 70–110)
HCT VFR BLD AUTO: 40.8 % (ref 37–48.5)
HDLC SERPL-MCNC: 95 MG/DL (ref 40–75)
HDLC SERPL: 43.4 % (ref 20–50)
HGB BLD-MCNC: 13.7 G/DL (ref 12–16)
IMM GRANULOCYTES # BLD AUTO: 0.02 K/UL (ref 0–0.04)
IMM GRANULOCYTES NFR BLD AUTO: 0.3 % (ref 0–0.5)
LDLC SERPL CALC-MCNC: 100.6 MG/DL (ref 63–159)
LYMPHOCYTES # BLD AUTO: 1.4 K/UL (ref 1–4.8)
LYMPHOCYTES NFR BLD: 19.7 % (ref 18–48)
MCH RBC QN AUTO: 31.9 PG (ref 27–31)
MCHC RBC AUTO-ENTMCNC: 33.6 G/DL (ref 32–36)
MCV RBC AUTO: 95 FL (ref 82–98)
MONOCYTES # BLD AUTO: 0.6 K/UL (ref 0.3–1)
MONOCYTES NFR BLD: 9.3 % (ref 4–15)
NEUTROPHILS # BLD AUTO: 4.7 K/UL (ref 1.8–7.7)
NEUTROPHILS NFR BLD: 67.7 % (ref 38–73)
NONHDLC SERPL-MCNC: 124 MG/DL
NRBC BLD-RTO: 0 /100 WBC
PLATELET # BLD AUTO: 256 K/UL (ref 150–450)
PMV BLD AUTO: 9.7 FL (ref 9.2–12.9)
POTASSIUM SERPL-SCNC: 4.6 MMOL/L (ref 3.5–5.1)
PROT SERPL-MCNC: 6.6 G/DL (ref 6–8.4)
RBC # BLD AUTO: 4.3 M/UL (ref 4–5.4)
SODIUM SERPL-SCNC: 141 MMOL/L (ref 136–145)
TRIGL SERPL-MCNC: 117 MG/DL (ref 30–150)
WBC # BLD AUTO: 6.89 K/UL (ref 3.9–12.7)

## 2025-03-10 PROCEDURE — 86140 C-REACTIVE PROTEIN: CPT | Performed by: INTERNAL MEDICINE

## 2025-03-10 PROCEDURE — 85025 COMPLETE CBC W/AUTO DIFF WBC: CPT | Performed by: INTERNAL MEDICINE

## 2025-03-10 PROCEDURE — 36415 COLL VENOUS BLD VENIPUNCTURE: CPT | Performed by: INTERNAL MEDICINE

## 2025-03-10 PROCEDURE — 80061 LIPID PANEL: CPT | Performed by: INTERNAL MEDICINE

## 2025-03-10 PROCEDURE — 80053 COMPREHEN METABOLIC PANEL: CPT | Performed by: INTERNAL MEDICINE

## 2025-03-10 PROCEDURE — 85652 RBC SED RATE AUTOMATED: CPT | Performed by: INTERNAL MEDICINE

## 2025-03-11 LAB — ERYTHROCYTE [SEDIMENTATION RATE] IN BLOOD BY PHOTOMETRIC METHOD: <2 MM/HR (ref 0–36)

## 2025-03-12 ENCOUNTER — OFFICE VISIT (OUTPATIENT)
Dept: RHEUMATOLOGY | Facility: CLINIC | Age: 55
End: 2025-03-12
Payer: COMMERCIAL

## 2025-03-12 VITALS
WEIGHT: 160.5 LBS | HEART RATE: 80 BPM | DIASTOLIC BLOOD PRESSURE: 71 MMHG | SYSTOLIC BLOOD PRESSURE: 118 MMHG | BODY MASS INDEX: 25.14 KG/M2

## 2025-03-12 DIAGNOSIS — D84.821 DRUG-INDUCED IMMUNODEFICIENCY: ICD-10-CM

## 2025-03-12 DIAGNOSIS — M05.79 RHEUMATOID ARTHRITIS INVOLVING MULTIPLE SITES WITH POSITIVE RHEUMATOID FACTOR: Primary | ICD-10-CM

## 2025-03-12 DIAGNOSIS — M81.0 AGE-RELATED OSTEOPOROSIS WITHOUT CURRENT PATHOLOGICAL FRACTURE: ICD-10-CM

## 2025-03-12 DIAGNOSIS — Z51.81 ENCOUNTER FOR MEDICATION MONITORING: ICD-10-CM

## 2025-03-12 DIAGNOSIS — Z79.899 DRUG-INDUCED IMMUNODEFICIENCY: ICD-10-CM

## 2025-03-12 PROCEDURE — 1160F RVW MEDS BY RX/DR IN RCRD: CPT | Mod: CPTII,S$GLB,, | Performed by: INTERNAL MEDICINE

## 2025-03-12 PROCEDURE — G2211 COMPLEX E/M VISIT ADD ON: HCPCS | Mod: S$GLB,,, | Performed by: INTERNAL MEDICINE

## 2025-03-12 PROCEDURE — 3078F DIAST BP <80 MM HG: CPT | Mod: CPTII,S$GLB,, | Performed by: INTERNAL MEDICINE

## 2025-03-12 PROCEDURE — 1159F MED LIST DOCD IN RCRD: CPT | Mod: CPTII,S$GLB,, | Performed by: INTERNAL MEDICINE

## 2025-03-12 PROCEDURE — 99999 PR PBB SHADOW E&M-EST. PATIENT-LVL III: CPT | Mod: PBBFAC,,, | Performed by: INTERNAL MEDICINE

## 2025-03-12 PROCEDURE — 3008F BODY MASS INDEX DOCD: CPT | Mod: CPTII,S$GLB,, | Performed by: INTERNAL MEDICINE

## 2025-03-12 PROCEDURE — 99215 OFFICE O/P EST HI 40 MIN: CPT | Mod: S$GLB,,, | Performed by: INTERNAL MEDICINE

## 2025-03-12 PROCEDURE — 3074F SYST BP LT 130 MM HG: CPT | Mod: CPTII,S$GLB,, | Performed by: INTERNAL MEDICINE

## 2025-03-12 RX ORDER — HYDROXYCHLOROQUINE SULFATE 200 MG/1
200 TABLET, FILM COATED ORAL 2 TIMES DAILY
Qty: 180 TABLET | Refills: 3 | Status: SHIPPED | OUTPATIENT
Start: 2025-03-12

## 2025-03-12 NOTE — PROGRESS NOTES
RHEUMATOLOGY CLINIC FOLLOW UP VISIT  Chief complaints, HPI, ROS, EXAM, Assessment & Plans:-  Elizabeth Paulino a 54 y.o. pleasant female comes in for follow-up visit.  Seropositive rheumatoid arthritis doing well on Rinvoq and Plaquenil here for follow-up visit.  Fragility fracture left wrist in 2022 managed surgically.  Healed well..  Not taking prednisone.  Not on any anti resorptive therapy for osteoporosis because of her concerns about medications.  Rheumatological review of system negative otherwise.  Physical examination shows no synovitis.  1. Rheumatoid arthritis involving multiple sites with positive rheumatoid factor    2. Age-related osteoporosis without current pathological fracture    3. Drug-induced immunodeficiency    4. Encounter for medication monitoring      Problem List Items Addressed This Visit       Rheumatoid arthritis involving multiple sites with positive rheumatoid factor - Primary (Chronic)    Relevant Medications    hydroxychloroquine (PLAQUENIL) 200 mg tablet    naltrexone capsule    Age-related osteoporosis without current pathological fracture    Drug-induced immunodeficiency    Encounter for medication monitoring       Labs reviewed today:-   Latest Reference Range & Units 03/10/25 16:00   WBC 3.90 - 12.70 K/uL 6.89   RBC 4.00 - 5.40 M/uL 4.30   Hemoglobin 12.0 - 16.0 g/dL 13.7   Hematocrit 37.0 - 48.5 % 40.8   MCV 82 - 98 fL 95   MCH 27.0 - 31.0 pg 31.9 (H)   MCHC 32.0 - 36.0 g/dL 33.6   RDW 11.5 - 14.5 % 12.7   Platelet Count 150 - 450 K/uL 256   MPV 9.2 - 12.9 fL 9.7   Gran % 38.0 - 73.0 % 67.7   Lymph % 18.0 - 48.0 % 19.7   Mono % 4.0 - 15.0 % 9.3   Eos % 0.0 - 8.0 % 2.3   Basophil % 0.0 - 1.9 % 0.7   Immature Granulocytes 0.0 - 0.5 % 0.3   Gran # (ANC) 1.8 - 7.7 K/uL 4.7   Lymph # 1.0 - 4.8 K/uL 1.4   Mono # 0.3 - 1.0 K/uL 0.6   Eos # 0.0 - 0.5 K/uL 0.2   Baso # 0.00 - 0.20 K/uL 0.05   Immature Grans (Abs) 0.00 - 0.04 K/uL 0.02    nRBC 0 /100 WBC 0   Differential Method  Automated   Sed Rate 0 - 36 mm/Hr <2   Sodium 136 - 145 mmol/L 141   Potassium 3.5 - 5.1 mmol/L 4.6   Chloride 95 - 110 mmol/L 107   CO2 23 - 29 mmol/L 23   Anion Gap 8 - 16 mmol/L 11   BUN 6 - 20 mg/dL 32 (H)   Creatinine 0.5 - 1.4 mg/dL 1.3   eGFR >60 mL/min/1.73 m^2 48 !   Glucose 70 - 110 mg/dL 93   Calcium 8.7 - 10.5 mg/dL 9.6   ALP 40 - 150 U/L 65   PROTEIN TOTAL 6.0 - 8.4 g/dL 6.6   Albumin 3.5 - 5.2 g/dL 3.9   BILIRUBIN TOTAL 0.1 - 1.0 mg/dL 0.2   AST 10 - 40 U/L 26   ALT 10 - 44 U/L 35   CRP 0.0 - 8.2 mg/L 0.5   Cholesterol Total 120 - 199 mg/dL 219 (H)   HDL 40 - 75 mg/dL 95 (H)   HDL/Cholesterol Ratio 20.0 - 50.0 % 43.4   Non-HDL Cholesterol mg/dL 124   Total Cholesterol/HDL Ratio 2.0 - 5.0  2.3   Triglycerides 30 - 150 mg/dL 117   LDL Cholesterol 63.0 - 159.0 mg/dL 100.6   (H): Data is abnormally high  !: Data is abnormal     Latest Reference Range & Units Most Recent   BEN Neg <1:160  Negative  4/4/12 11:42   BEN Screen Negative <1:80  Positive !  4/27/21 14:55   BEN HEP-2 Titer  Positive 1:160 Homogeneous  10/10/13 15:22   BEN Titer 1  1:320  4/27/21 14:55   BEN PATTERN 1  Homogeneous  4/27/21 14:55   ds DNA Ab Negative 1:10  Negative 1:10  4/27/21 14:55   Anti-SSA Antibody 0.00 - 0.99 Ratio 0.05  4/27/21 14:55   Anti-SSA Interpretation Negative  Negative  4/27/21 14:55   Anti-SSB Antibody 0.00 - 0.99 Ratio 0.10  4/27/21 14:55   Anti-SSB Interpretation Negative  Negative  4/27/21 14:55   Anti Sm Antibody 0.00 - 0.99 Ratio 0.09  4/27/21 14:55   Anti-Sm Interpretation Negative  Negative  4/27/21 14:55   Anti Sm/RNP Antibody 0.00 - 0.99 Ratio 0.08  4/27/21 14:55   Anti-Sm/RNP Interpretation Negative  Negative  4/27/21 14:55   CCP Antibodies <5.0 U/mL 103.9 (H)  10/9/23 15:54   Rheumatoid Factor 0.0 - 15.0 IU/mL 16.0 (H)  10/11/21 16:36   !: Data is abnormal  (H): Data is abnormally high      Well controlled rheumatoid on Plaquenil and Rinvoq.  Continue Rinvoq and  Plaquenil.  Had mild flare with dose reduction of Plaquenil in the recent past.   Advised importance of yearly Plaquenil clearance from ophthalmologist.  Personally reviewed DEXA scan images done by her Ob in December 20, 2024.  Significant spine predominant osteoporosis not on any anti resorptive therapy.  We have discussed about the importance of treating osteoporosis and she has decided not to pursue any anti resorptive therapy at this time.  Advised to repeat bone density scan later this year with her Ob.  Drug induced immunodeficiency due to use of immunosuppressive drugs. Monitor carefully for infections. Advised to get immediate medical care if any infection. Also advised strict adherence to age appropriate vaccinations and cancer screenings with PCP.  Hold Rinvoq if any infection  I have explained all of the above in detail and the patient understands all of the current recommendation(s). I have answered all questions to the best of my ability and to their complete satisfaction.       # Follow up in about 6 months (around 9/12/2025).      Disclaimer: This note was prepared using voice recognition system and is likely to have sound alike errors and is not proof read.  Please call me with any questions.

## 2025-03-13 ENCOUNTER — TELEPHONE (OUTPATIENT)
Dept: RHEUMATOLOGY | Facility: CLINIC | Age: 55
End: 2025-03-13
Payer: COMMERCIAL

## 2025-06-20 ENCOUNTER — PATIENT MESSAGE (OUTPATIENT)
Dept: RHEUMATOLOGY | Facility: CLINIC | Age: 55
End: 2025-06-20
Payer: COMMERCIAL

## 2025-06-20 DIAGNOSIS — M05.79 RHEUMATOID ARTHRITIS INVOLVING MULTIPLE SITES WITH POSITIVE RHEUMATOID FACTOR: ICD-10-CM

## 2025-06-20 NOTE — TELEPHONE ENCOUNTER
[id number] - [place], [place] - [address] [facility] AT [facility]       Patient wants new script of tramadol sent to above pharmacy

## 2025-06-22 RX ORDER — TRAMADOL HYDROCHLORIDE 50 MG/1
50 TABLET, FILM COATED ORAL EVERY 8 HOURS PRN
Qty: 21 TABLET | Refills: 0 | Status: SHIPPED | OUTPATIENT
Start: 2025-06-22 | End: 2025-06-29

## 2025-07-09 DIAGNOSIS — D84.821 DRUG-INDUCED IMMUNODEFICIENCY: Primary | ICD-10-CM

## 2025-07-09 DIAGNOSIS — Z79.899 DRUG-INDUCED IMMUNODEFICIENCY: Primary | ICD-10-CM

## 2025-07-09 DIAGNOSIS — Z79.60 LONG TERM CURRENT USE OF IMMUNOSUPPRESSIVE DRUG: ICD-10-CM

## 2025-07-09 DIAGNOSIS — Z51.81 ENCOUNTER FOR MEDICATION MONITORING: ICD-10-CM

## 2025-07-09 DIAGNOSIS — Z79.899 HIGH RISK MEDICATION USE: ICD-10-CM
